# Patient Record
Sex: FEMALE | Race: WHITE | NOT HISPANIC OR LATINO | Employment: OTHER | URBAN - METROPOLITAN AREA
[De-identification: names, ages, dates, MRNs, and addresses within clinical notes are randomized per-mention and may not be internally consistent; named-entity substitution may affect disease eponyms.]

---

## 2017-08-16 ENCOUNTER — LAB (OUTPATIENT)
Dept: INTERNAL MEDICINE | Facility: CLINIC | Age: 72
End: 2017-08-16

## 2017-08-16 DIAGNOSIS — Z00.00 MEDICARE ANNUAL WELLNESS VISIT, INITIAL: Primary | ICD-10-CM

## 2017-08-16 LAB
ALBUMIN SERPL-MCNC: 4.4 G/DL (ref 3.2–4.8)
ALBUMIN/GLOB SERPL: 1.7 G/DL (ref 1.5–2.5)
ALP SERPL-CCNC: 56 U/L (ref 25–100)
ALT SERPL W P-5'-P-CCNC: 22 U/L (ref 7–40)
ANION GAP SERPL CALCULATED.3IONS-SCNC: 2 MMOL/L (ref 3–11)
ARTICHOKE IGE QN: 131 MG/DL (ref 0–130)
AST SERPL-CCNC: 24 U/L (ref 0–33)
BASOPHILS # BLD AUTO: 0.04 10*3/MM3 (ref 0–0.2)
BASOPHILS NFR BLD AUTO: 0.9 % (ref 0–1)
BILIRUB SERPL-MCNC: 0.8 MG/DL (ref 0.3–1.2)
BILIRUB UR QL STRIP: NEGATIVE
BUN BLD-MCNC: 10 MG/DL (ref 9–23)
BUN/CREAT SERPL: 14.3 (ref 7–25)
CALCIUM SPEC-SCNC: 9.6 MG/DL (ref 8.7–10.4)
CHLORIDE SERPL-SCNC: 102 MMOL/L (ref 99–109)
CHOLEST SERPL-MCNC: 207 MG/DL (ref 0–200)
CLARITY UR: CLEAR
CO2 SERPL-SCNC: 32 MMOL/L (ref 20–31)
COLOR UR: YELLOW
CREAT BLD-MCNC: 0.7 MG/DL (ref 0.6–1.3)
DEPRECATED RDW RBC AUTO: 44.9 FL (ref 37–54)
EOSINOPHIL # BLD AUTO: 0.19 10*3/MM3 (ref 0–0.3)
EOSINOPHIL NFR BLD AUTO: 4.2 % (ref 0–3)
ERYTHROCYTE [DISTWIDTH] IN BLOOD BY AUTOMATED COUNT: 13.6 % (ref 11.3–14.5)
GFR SERPL CREATININE-BSD FRML MDRD: 82 ML/MIN/1.73
GLOBULIN UR ELPH-MCNC: 2.6 GM/DL
GLUCOSE BLD-MCNC: 91 MG/DL (ref 70–100)
GLUCOSE UR STRIP-MCNC: NEGATIVE MG/DL
HCT VFR BLD AUTO: 40.4 % (ref 34.5–44)
HDLC SERPL-MCNC: 74 MG/DL (ref 40–60)
HGB BLD-MCNC: 13.3 G/DL (ref 11.5–15.5)
HGB UR QL STRIP.AUTO: NEGATIVE
IMM GRANULOCYTES # BLD: 0 10*3/MM3 (ref 0–0.03)
IMM GRANULOCYTES NFR BLD: 0 % (ref 0–0.6)
KETONES UR QL STRIP: NEGATIVE
LEUKOCYTE ESTERASE UR QL STRIP.AUTO: NEGATIVE
LYMPHOCYTES # BLD AUTO: 1.48 10*3/MM3 (ref 0.6–4.8)
LYMPHOCYTES NFR BLD AUTO: 32.7 % (ref 24–44)
MCH RBC QN AUTO: 29.4 PG (ref 27–31)
MCHC RBC AUTO-ENTMCNC: 32.9 G/DL (ref 32–36)
MCV RBC AUTO: 89.2 FL (ref 80–99)
MONOCYTES # BLD AUTO: 0.54 10*3/MM3 (ref 0–1)
MONOCYTES NFR BLD AUTO: 11.9 % (ref 0–12)
NEUTROPHILS # BLD AUTO: 2.27 10*3/MM3 (ref 1.5–8.3)
NEUTROPHILS NFR BLD AUTO: 50.3 % (ref 41–71)
NITRITE UR QL STRIP: NEGATIVE
PH UR STRIP.AUTO: 8 [PH] (ref 5–8)
PLATELET # BLD AUTO: 251 10*3/MM3 (ref 150–450)
PMV BLD AUTO: 9.3 FL (ref 6–12)
POTASSIUM BLD-SCNC: 4.6 MMOL/L (ref 3.5–5.5)
PROT SERPL-MCNC: 7 G/DL (ref 5.7–8.2)
PROT UR QL STRIP: NEGATIVE
RBC # BLD AUTO: 4.53 10*6/MM3 (ref 3.89–5.14)
SODIUM BLD-SCNC: 136 MMOL/L (ref 132–146)
SP GR UR STRIP: 1.01 (ref 1–1.03)
TRIGL SERPL-MCNC: 53 MG/DL (ref 0–150)
TSH SERPL DL<=0.05 MIU/L-ACNC: 2.93 MIU/ML (ref 0.35–5.35)
UROBILINOGEN UR QL STRIP: NORMAL
WBC NRBC COR # BLD: 4.52 10*3/MM3 (ref 3.5–10.8)

## 2017-08-16 PROCEDURE — 80061 LIPID PANEL: CPT | Performed by: INTERNAL MEDICINE

## 2017-08-16 PROCEDURE — 85025 COMPLETE CBC W/AUTO DIFF WBC: CPT | Performed by: INTERNAL MEDICINE

## 2017-08-16 PROCEDURE — 80053 COMPREHEN METABOLIC PANEL: CPT | Performed by: INTERNAL MEDICINE

## 2017-08-16 PROCEDURE — 36415 COLL VENOUS BLD VENIPUNCTURE: CPT | Performed by: INTERNAL MEDICINE

## 2017-08-16 PROCEDURE — 81003 URINALYSIS AUTO W/O SCOPE: CPT | Performed by: INTERNAL MEDICINE

## 2017-08-16 PROCEDURE — 84443 ASSAY THYROID STIM HORMONE: CPT | Performed by: INTERNAL MEDICINE

## 2017-08-28 ENCOUNTER — OFFICE VISIT (OUTPATIENT)
Dept: INTERNAL MEDICINE | Facility: CLINIC | Age: 72
End: 2017-08-28

## 2017-08-28 VITALS
DIASTOLIC BLOOD PRESSURE: 74 MMHG | WEIGHT: 139 LBS | HEIGHT: 62 IN | BODY MASS INDEX: 25.58 KG/M2 | SYSTOLIC BLOOD PRESSURE: 124 MMHG

## 2017-08-28 DIAGNOSIS — H61.22 IMPACTED CERUMEN, LEFT EAR: ICD-10-CM

## 2017-08-28 DIAGNOSIS — G89.29 CHRONIC LEFT SHOULDER PAIN: ICD-10-CM

## 2017-08-28 DIAGNOSIS — M25.512 CHRONIC LEFT SHOULDER PAIN: ICD-10-CM

## 2017-08-28 DIAGNOSIS — E78.00 PURE HYPERCHOLESTEROLEMIA: ICD-10-CM

## 2017-08-28 DIAGNOSIS — Z78.0 MENOPAUSE: ICD-10-CM

## 2017-08-28 DIAGNOSIS — Z12.11 ENCOUNTER FOR SCREENING COLONOSCOPY: ICD-10-CM

## 2017-08-28 DIAGNOSIS — Z00.00 MEDICARE ANNUAL WELLNESS VISIT, SUBSEQUENT: Primary | ICD-10-CM

## 2017-08-28 PROCEDURE — G0008 ADMIN INFLUENZA VIRUS VAC: HCPCS | Performed by: INTERNAL MEDICINE

## 2017-08-28 PROCEDURE — 69210 REMOVE IMPACTED EAR WAX UNI: CPT | Performed by: INTERNAL MEDICINE

## 2017-08-28 PROCEDURE — G0444 DEPRESSION SCREEN ANNUAL: HCPCS | Performed by: INTERNAL MEDICINE

## 2017-08-28 PROCEDURE — G0439 PPPS, SUBSEQ VISIT: HCPCS | Performed by: INTERNAL MEDICINE

## 2017-08-28 PROCEDURE — 99214 OFFICE O/P EST MOD 30 MIN: CPT | Performed by: INTERNAL MEDICINE

## 2017-08-28 PROCEDURE — 90662 IIV NO PRSV INCREASED AG IM: CPT | Performed by: INTERNAL MEDICINE

## 2017-08-28 PROCEDURE — 93000 ELECTROCARDIOGRAM COMPLETE: CPT | Performed by: INTERNAL MEDICINE

## 2017-08-28 NOTE — ASSESSMENT & PLAN NOTE
Health maintenance - flu vacc given today; Prevnar 7/15, PVX 5/11, Tdap 2009; Zostavax 2010; mammo after 10/13/17; no further Paps unless abnlities; DEXA 7/15, repeat 2018; colonosc due (last 6/12) with Dr. Link; eye exam with Dr. Ying/Dr. Keenan pending 9/17 (monitored for cataracts, no haloes at night); dental exam UTD, every 6 mos    Consultants:  Patient Care Team:  Gabby Kebede MD as PCP - General  Gabby Kebede MD as PCP - Internal Medicine  SHIRAZ Diaz as PCP - Claims Attributed  Casey Link MD as Consulting Physician (Gastroenterology)  Anuel Diaz MD as Consulting Physician (Orthopedic Surgery)  Matthew Vanegas MD as Consulting Physician (Orthopedic Surgery)  FLOR Ying Jr., MD as Consulting Physician (Ophthalmology)  Melia Keenan MD as Consulting Physician (Ophthalmology)  Raghavendra PITTS MD as Consulting Physician (Otolaryngology)

## 2017-08-28 NOTE — PROGRESS NOTES
ANNUAL WELLNESS VISIT    DRUG AND ALCOHOL USE      no alcohol use, no tobacco use and no caffeine use    DIET AND PHYSICAL ACTIVITY     Diet: general    Exercise: frequently   Exercise Details: walking, Gemma     MOOD DISORDER AND COGNITIVE SCREENING   Depression Screening Tool Used yes - see PHQ-9   Anxiety Screening Tool Used yes     Mini-Cog Performed   Yes    1. Tell Patient 3 Words table,car,book    2. Administer Clock Test normal    3. Recall 3 words  table,car,book    4. Number Correct Items 3    FUNCTIONAL ABILITY AND LEVEL OF SAFETY   Hearing no hearing loss     Wears Hearing Aids No       Current Activities Independent      none  - see Funct/Cog Status Intake     Fall Risk Assessment       Has difficulty with walking or balance  No         Timed Up and Go (TUG) Test  4 sec.       If >12 sec, normal    ADVANCED DIRECTIVE has an advance directive - a copy has been provided and is in file    PAIN SCREENING Do you have pain right now? no          Recent Hospitalizations:  No hospitalization(s) within the last year..     MEDICATION REVIEW   - updated and reviewed (see Medication List).   - reviewed for potentially harmful drug-disease interactions in the elderly.   - reviewed for high risk medications in the elderly.   - aspirin use: Not Indicated   _________________________________________  Chief Complaint   Patient presents with   • Medicare Wellness       History of Present Illness  71 y.o.  woman presents for updated wellness exam.  Reports a near-fall last 10/16; tripped in the garage but caught herself with her left arm on the mower.  Did not fall but had pain thereafter.  Continues to have pain in the left arm only in a specific position.  States it does not affect any of her daily activities nor impedes with exercise.  No assoc'd weakness, numbness/tingling.  She is right-handed.    Review of Systems  Denies headaches, visual changes, CP, palpitations, SOB, cough, abd pain, n/v/d, difficulty  "with urination, vaginal discharge or bleeding (no periods), breast concerns, numbness/tingling, falls, mood changes, lightheadedness, hearing changes, rashes.    ROS (+) for rare episodes of left arm/shoulder pain as above.    Reports seeing ENT in 5/17 and needed left ear cleaned out as well.     All other ROS reviewed and negative.    [facility]  The following portions of the patient's history were reviewed and updated as appropriate: allergies, current medications, past family history, past medical history, past social history, past surgical history and problem list.    Current Outpatient Prescriptions:   •  Biotin 5000 MCG capsule, QD  •  calcium carbonate-vitamin d 600-400 MG-UNIT QD  •  PREMARIN) 0.625 MG/GM vaginal cream, Qweek  •  Multiple Vitamins-Minerals QD    VITALS:  /74  Ht 61.5\" (156.2 cm)  Wt 139 lb (63 kg)  BMI 25.84 kg/m2    Physical Exam   Constitutional: She is oriented to person, place, and time. She appears well-developed and well-nourished.   HENT:   Head: Normocephalic.   Right Ear: External ear normal.   Nose: Nose normal.   Mouth/Throat: Oropharynx is clear and moist and mucous membranes are normal. No oropharyngeal exudate.   Left aud canal occluded by cerumen   Eyes: Conjunctivae and EOM are normal. Pupils are equal, round, and reactive to light.   Wears glasses   Neck: Normal range of motion. Neck supple. Carotid bruit is not present (bilaterally). No thyromegaly present.   Cardiovascular: Normal rate, regular rhythm and normal heart sounds.    Pulmonary/Chest: Effort normal and breath sounds normal. No respiratory distress.   Abdominal: Soft. Bowel sounds are normal. She exhibits no distension and no mass. There is no hepatosplenomegaly. There is no tenderness.   Genitourinary:   Genitourinary Comments: Breast exam unremarkable without masses, skin changes, nipple discharge, or axillary adenopathy.     Musculoskeletal: Normal range of motion. She exhibits no edema (+) " spider/varicose veins BLE.   Lymphadenopathy:     She has no cervical adenopathy.   Neurological: She is alert and oriented to person, place, and time. She has normal reflexes. She displays normal reflexes. No cranial nerve deficit.   Skin: Skin is warm and dry. No rash noted.   Psychiatric: She has a normal mood and affect. Her behavior is normal.   Nursing note and vitals reviewed.        LABS  Results for orders placed or performed in visit on 08/16/17   Lipid Panel   Result Value Ref Range    Total Cholesterol 207 (H) 0 - 200 mg/dL    Triglycerides 53 0 - 150 mg/dL    HDL Cholesterol 74 (H) 40 - 60 mg/dL    LDL Cholesterol  131 (H) 0 - 130 mg/dL   Urinalysis With / Microscopic If Indicated   Result Value Ref Range    Color, UA Yellow Yellow, Straw    Appearance, UA Clear Clear    pH, UA 8.0 5.0 - 8.0    Specific Gravity, UA 1.010 1.001 - 1.030    Glucose, UA Negative Negative    Ketones, UA Negative Negative    Bilirubin, UA Negative Negative    Blood, UA Negative Negative    Protein, UA Negative Negative    Leuk Esterase, UA Negative Negative    Nitrite, UA Negative Negative    Urobilinogen, UA 0.2 E.U./dL 0.2 - 1.0 E.U./dL   Comprehensive Metabolic Panel   Result Value Ref Range    Glucose 91 70 - 100 mg/dL    BUN 10 9 - 23 mg/dL    Creatinine 0.70 0.60 - 1.30 mg/dL    Sodium 136 132 - 146 mmol/L    Potassium 4.6 3.5 - 5.5 mmol/L    Chloride 102 99 - 109 mmol/L    CO2 32.0 (H) 20.0 - 31.0 mmol/L    Calcium 9.6 8.7 - 10.4 mg/dL    Total Protein 7.0 5.7 - 8.2 g/dL    Albumin 4.40 3.20 - 4.80 g/dL    ALT (SGPT) 22 7 - 40 U/L    AST (SGOT) 24 0 - 33 U/L    Alkaline Phosphatase 56 25 - 100 U/L    Total Bilirubin 0.8 0.3 - 1.2 mg/dL    eGFR Non African Amer 82 >60 mL/min/1.73    Globulin 2.6 gm/dL    A/G Ratio 1.7 1.5 - 2.5 g/dL    BUN/Creatinine Ratio 14.3 7.0 - 25.0    Anion Gap 2.0 (L) 3.0 - 11.0 mmol/L   TSH   Result Value Ref Range    TSH 2.930 0.350 - 5.350 mIU/mL   CBC Auto Differential   Result Value Ref  Range    WBC 4.52 3.50 - 10.80 10*3/mm3    RBC 4.53 3.89 - 5.14 10*6/mm3    Hemoglobin 13.3 11.5 - 15.5 g/dL    Hematocrit 40.4 34.5 - 44.0 %    MCV 89.2 80.0 - 99.0 fL    MCH 29.4 27.0 - 31.0 pg    MCHC 32.9 32.0 - 36.0 g/dL    RDW 13.6 11.3 - 14.5 %    RDW-SD 44.9 37.0 - 54.0 fl    MPV 9.3 6.0 - 12.0 fL    Platelets 251 150 - 450 10*3/mm3    Neutrophil % 50.3 41.0 - 71.0 %    Lymphocyte % 32.7 24.0 - 44.0 %    Monocyte % 11.9 0.0 - 12.0 %    Eosinophil % 4.2 (H) 0.0 - 3.0 %    Basophil % 0.9 0.0 - 1.0 %    Immature Grans % 0.0 0.0 - 0.6 %    Neutrophils, Absolute 2.27 1.50 - 8.30 10*3/mm3    Lymphocytes, Absolute 1.48 0.60 - 4.80 10*3/mm3    Monocytes, Absolute 0.54 0.00 - 1.00 10*3/mm3    Eosinophils, Absolute 0.19 0.00 - 0.30 10*3/mm3    Basophils, Absolute 0.04 0.00 - 0.20 10*3/mm3    Immature Grans, Absolute 0.00 0.00 - 0.03 10*3/mm3    8/16 lipids , TG 41, HDL 76,       ECG 12 Lead  Date/Time: 8/28/2017 8:47 AM  Performed by: MAURISIO AVILEZ  Authorized by: MAURISIO AVILEZ   Comparison: compared with previous ECG from 8/25/2016  Similar to previous ECG  Rhythm: sinus rhythm  Rhythm comments: sinus arrhythmia  Rate: normal  Conduction: conduction normal  ST Segments: ST segments normal  QRS axis: normal  Other findings comments: left atrial abnormality  Clinical impression comment: stable EKG            ASSESSMENT/PLAN  Problem List Items Addressed This Visit     Menopause    Relevant Medications    conjugated estrogens (PREMARIN) 0.625 MG/GM vaginal cream    Hyperlipidemia     Lipids borderline with  with goal LDL < 130 (previously 116); decrease saturated fats and cholesterol in the diet         Relevant Orders    ECG 12 Lead    Medicare annual wellness visit, subsequent - Primary     Health maintenance - flu vacc given today; Prevnar 7/15, PVX 5/11, Tdap 2009; Zostavax 2010; mammo after 10/13/17; no further Paps unless abnlities; DEXA 7/15, repeat 2018; colonosc due (last 6/12) with   Nikolay; eye exam with Dr. Ying/Dr. Keenan pending 9/17 (monitored for cataracts, no haloes at night); dental exam UTD, every 6 mos    Consultants:  Patient Care Team:  Gabby Kebede MD as PCP - General  Gabby Kebede MD as PCP - Internal Medicine  SHIRAZ Diaz as PCP - Claims Attributed  Casey Link MD as Consulting Physician (Gastroenterology)  Anuel Diaz MD as Consulting Physician (Orthopedic Surgery)  Matthew Vanegas MD as Consulting Physician (Orthopedic Surgery)  FLOR Ying Jr., MD as Consulting Physician (Ophthalmology)  Melia Keenan MD as Consulting Physician (Ophthalmology)  Raghavendra PITTS MD as Consulting Physician (Otolaryngology)               Other Visit Diagnoses     Encounter for screening colonoscopy        Relevant Orders    Ambulatory Referral to Gastroenterology    Impacted cerumen, left ear        s/p lavage of left ear with instrumentation in the office; no complications    Chronic left shoulder pain        ongoing since 10/16 but not impacting fxn or ADLs; rec consider PT if wants to have it evaluated further          FOLLOW-UP  RTC 1yr for annual wellness visit; fasting labs the week prior to appt (CBC, CMP, TSH, lipids, UA/micro)      Electronically signed by:    Gabby Kebede MD  08/28/2017

## 2017-08-28 NOTE — ASSESSMENT & PLAN NOTE
Lipids borderline with  with goal LDL < 130 (previously 116); decrease saturated fats and cholesterol in the diet

## 2017-09-01 ENCOUNTER — TRANSCRIBE ORDERS (OUTPATIENT)
Dept: ADMINISTRATIVE | Facility: HOSPITAL | Age: 72
End: 2017-09-01

## 2017-09-01 DIAGNOSIS — Z12.31 VISIT FOR SCREENING MAMMOGRAM: Primary | ICD-10-CM

## 2017-10-25 ENCOUNTER — HOSPITAL ENCOUNTER (OUTPATIENT)
Dept: MAMMOGRAPHY | Facility: HOSPITAL | Age: 72
Discharge: HOME | End: 2017-10-25
Attending: INTERNAL MEDICINE | Admitting: INTERNAL MEDICINE

## 2017-10-25 DIAGNOSIS — Z12.31 VISIT FOR SCREENING MAMMOGRAM: ICD-10-CM

## 2017-10-25 PROCEDURE — 77063 BREAST TOMOSYNTHESIS BI: CPT

## 2017-10-25 PROCEDURE — G0202 SCR MAMMO BI INCL CAD: HCPCS

## 2017-10-26 PROCEDURE — G0202 SCR MAMMO BI INCL CAD: HCPCS | Performed by: RADIOLOGY

## 2017-10-26 PROCEDURE — 77063 BREAST TOMOSYNTHESIS BI: CPT | Performed by: RADIOLOGY

## 2017-11-10 ENCOUNTER — OFFICE VISIT (OUTPATIENT)
Dept: INTERNAL MEDICINE | Facility: CLINIC | Age: 72
End: 2017-11-10

## 2017-11-10 VITALS
HEIGHT: 62 IN | BODY MASS INDEX: 26.31 KG/M2 | DIASTOLIC BLOOD PRESSURE: 74 MMHG | TEMPERATURE: 98.7 F | HEART RATE: 73 BPM | OXYGEN SATURATION: 99 % | WEIGHT: 143 LBS | SYSTOLIC BLOOD PRESSURE: 132 MMHG

## 2017-11-10 DIAGNOSIS — H65.03 BILATERAL ACUTE SEROUS OTITIS MEDIA, RECURRENCE NOT SPECIFIED: ICD-10-CM

## 2017-11-10 DIAGNOSIS — J06.9 UPPER RESPIRATORY TRACT INFECTION, UNSPECIFIED TYPE: Primary | ICD-10-CM

## 2017-11-10 PROCEDURE — 99213 OFFICE O/P EST LOW 20 MIN: CPT | Performed by: NURSE PRACTITIONER

## 2017-11-10 RX ORDER — DEXTROMETHORPHAN HYDROBROMIDE AND PROMETHAZINE HYDROCHLORIDE 15; 6.25 MG/5ML; MG/5ML
5 SYRUP ORAL 4 TIMES DAILY PRN
Qty: 75 ML | Refills: 0 | Status: SHIPPED | OUTPATIENT
Start: 2017-11-10 | End: 2018-03-23

## 2017-11-10 RX ORDER — PSEUDOEPHEDRINE HCL 30 MG
TABLET ORAL
Qty: 20 TABLET | Refills: 0 | Status: SHIPPED | OUTPATIENT
Start: 2017-11-10 | End: 2018-03-23

## 2017-11-10 RX ORDER — CETIRIZINE HYDROCHLORIDE 10 MG/1
10 TABLET ORAL DAILY
Qty: 30 TABLET | Refills: 1 | Status: SHIPPED | OUTPATIENT
Start: 2017-11-10 | End: 2018-03-23

## 2017-11-10 NOTE — PROGRESS NOTES
Chief Complaint   Patient presents with   • Cough     pt states that this has been going on since yesterday, she started on tuesday with congestion.    • Nasal Congestion     and chest   • Sore lymph nodes   • Chills       HPI  Brian Pablo is a 72 y.o. female  presents with complaint of raspy throat since this past Tuesday or Wednesday, but she states she felt ok; today, she states she feels really bad and has c/o severe PND; prod whitish/yellow cough from PND; tender glands in neck; no fever, fatigue; headache waxes and wanes; chills, chest and ribs are sore from coughing which is worse when lying down.  She denies wheezing, shortness of breath, n/v/d; body aches.    She has been taking OTC cold medications and cough syrup for her symptoms.    Past Medical History:   Diagnosis Date   • Benign polyp of large intestine     colonosc 2004   • Hx of bone density study 06/2012    DEXA 6/12 nl   • Hx of bone density study 07/30/2015    DEXA nl 7/15 (-0.6), repeat 3 yrs   • Hx of colonoscopy 06/2012    nl colonoscopy 6/12, repeat in 5 yrs, per GI- Dr. Link   • Hx of Doppler ultrasound     neg AAA ultrasound (6/11)       Family History   Problem Relation Age of Onset   • Diabetes Father    • Stroke Father    • Heart disease Father    • Breast cancer Sister 45   • Angina Maternal Grandmother    • Stroke Paternal Grandmother 39   • Arthritis Mother    • Cancer Mother    • Breast cancer Mother 67   • Colon cancer Neg Hx        Social History     Social History   • Marital status:      Spouse name: N/A   • Number of children: 2   • Years of education: N/A     Occupational History   • retired speech pathologist      Social History Main Topics   • Smoking status: Never Smoker   • Smokeless tobacco: Never Used   • Alcohol use No   • Drug use: Not on file   • Sexual activity: No     Other Topics Concern   • Not on file     Social History Narrative    2 kids and 5 grandkids        Exercise - walking, alexandrea, weights  "      Review of Systems   Constitutional: Positive for chills. Negative for fever.   HENT: Positive for postnasal drip and rhinorrhea. Negative for ear pain and sore throat.    Respiratory: Positive for cough. Negative for shortness of breath and wheezing.    Cardiovascular: Negative for chest pain.   Musculoskeletal: Positive for myalgias.   Skin: Negative for rash.   Neurological: Positive for headaches. Negative for dizziness.   All other systems reviewed and are negative.      /74  Pulse 73  Temp 98.7 °F (37.1 °C)  Ht 61.5\" (156.2 cm)  Wt 143 lb (64.9 kg)  SpO2 99%  BMI 26.58 kg/m2    Physical Exam   Constitutional: She is oriented to person, place, and time. She appears well-developed and well-nourished.   HENT:   Head: Normocephalic and atraumatic.   Right Ear: External ear normal. Tympanic membrane is erythematous (mild) and bulging. A middle ear effusion (large yellow effusion, dull, minimal light reflex) is present.   Left Ear: External ear normal. Tympanic membrane is erythematous (mild) and bulging. A middle ear effusion is present.   Nose: Mucosal edema (moderate erythema, mild sweling) present. Right sinus exhibits no maxillary sinus tenderness and no frontal sinus tenderness. Left sinus exhibits no maxillary sinus tenderness and no frontal sinus tenderness.   Mouth/Throat: Uvula is midline. Posterior oropharyngeal erythema (moderate erythema; large amts of thick white PND; severe cobblestoning) present.   Eyes: Conjunctivae are normal.   Neck: Normal range of motion. Neck supple.   Cardiovascular: Normal rate, regular rhythm and normal heart sounds.    No murmur heard.  Pulmonary/Chest: Effort normal and breath sounds normal.   Lymphadenopathy:     She has cervical adenopathy (bilateral ant cervical node enlargement with tenderness).   Neurological: She is alert and oriented to person, place, and time.   Skin: Skin is warm, dry and intact. No rash noted.   Vitals " reviewed.      Assessment/Plan    1. Upper respiratory tract infection, unspecified type  - promethazine-dextromethorphan (PROMETHAZINE-DM) 6.25-15 MG/5ML syrup; Take 5 mL by mouth 4 (Four) Times a Day As Needed for Cough.  Dispense: 75 mL; Refill: 0    2. Bilateral acute serous otitis media, recurrence not specified  - cetirizine (zyrTEC) 10 MG tablet; Take 1 tablet by mouth Daily.  Dispense: 30 tablet; Refill: 1  - pseudoephedrine (SUDAFED) 30 MG tablet; Take twice a day as needed  Dispense: 20 tablet; Refill: 0    F/U if symptoms do not improve or worsen; otherwise keep next scheduled appointment with Dr. Kebede    Patient verbalizes understanding of medication dosage, comfort measures, instructions for treatment and follow-up.    Krystle Figueredo, SHIRAZ  11/10/2017    6:51 PM

## 2017-11-12 ENCOUNTER — TELEPHONE (OUTPATIENT)
Dept: INTERNAL MEDICINE | Facility: CLINIC | Age: 72
End: 2017-11-12

## 2017-11-12 DIAGNOSIS — J01.00 ACUTE MAXILLARY SINUSITIS, RECURRENCE NOT SPECIFIED: Primary | ICD-10-CM

## 2017-11-12 RX ORDER — FLUTICASONE PROPIONATE 50 MCG
1 SPRAY, SUSPENSION (ML) NASAL 2 TIMES DAILY
Qty: 1 BOTTLE | Refills: 0 | Status: SHIPPED | OUTPATIENT
Start: 2017-11-12 | End: 2018-03-23

## 2017-11-12 RX ORDER — CEFDINIR 300 MG/1
300 CAPSULE ORAL 2 TIMES DAILY
Qty: 20 CAPSULE | Refills: 0 | Status: SHIPPED | OUTPATIENT
Start: 2017-11-12 | End: 2018-03-23

## 2017-11-12 NOTE — TELEPHONE ENCOUNTER
Pt called stating that she is not feeling better, she stated that you had told her to call you if she didn't get better.

## 2017-11-12 NOTE — TELEPHONE ENCOUNTER
What are her oxygen saturation levels?  She has a pulse ox at home.  If she is still running in the 80s, she needs to go to the hospital.  If she will go, I will call ER to let them know she is coming and why.

## 2017-11-12 NOTE — TELEPHONE ENCOUNTER
Her o2 is at 97, she says the cough is gone and the nasal congestion but she has a terrible headache and feels like it is all in her head.

## 2017-11-12 NOTE — TELEPHONE ENCOUNTER
Cefdinir 300 mg capsules, take 1 capsule twice a day x 10 days; Flonase nasal spray, 1 spray to each nostril twice a day (may stop when symptoms are gone) sent to [facility] pharmacy.  She needs to f/u if she has no improvement.

## 2017-12-07 PROBLEM — K63.5 HYPERPLASTIC COLON POLYP: Status: ACTIVE | Noted: 2017-12-07

## 2018-03-23 ENCOUNTER — OFFICE VISIT (OUTPATIENT)
Dept: INTERNAL MEDICINE | Facility: CLINIC | Age: 73
End: 2018-03-23

## 2018-03-23 VITALS
SYSTOLIC BLOOD PRESSURE: 124 MMHG | DIASTOLIC BLOOD PRESSURE: 68 MMHG | RESPIRATION RATE: 16 BRPM | BODY MASS INDEX: 27.14 KG/M2 | WEIGHT: 146 LBS | HEART RATE: 78 BPM

## 2018-03-23 DIAGNOSIS — N39.0 URINARY TRACT INFECTION IN FEMALE: Primary | ICD-10-CM

## 2018-03-23 LAB
BILIRUB BLD-MCNC: NEGATIVE MG/DL
CLARITY, POC: CLEAR
COLOR UR: YELLOW
GLUCOSE UR STRIP-MCNC: NEGATIVE MG/DL
KETONES UR QL: NEGATIVE
LEUKOCYTE EST, POC: ABNORMAL
NITRITE UR-MCNC: NEGATIVE MG/ML
PH UR: 5 [PH] (ref 5–8)
PROT UR STRIP-MCNC: NEGATIVE MG/DL
RBC # UR STRIP: ABNORMAL /UL
SP GR UR: 1.01 (ref 1–1.03)
UROBILINOGEN UR QL: NORMAL

## 2018-03-23 PROCEDURE — 81003 URINALYSIS AUTO W/O SCOPE: CPT | Performed by: INTERNAL MEDICINE

## 2018-03-23 PROCEDURE — 99213 OFFICE O/P EST LOW 20 MIN: CPT | Performed by: INTERNAL MEDICINE

## 2018-03-23 RX ORDER — SULFAMETHOXAZOLE AND TRIMETHOPRIM 800; 160 MG/1; MG/1
1 TABLET ORAL 2 TIMES DAILY
Qty: 10 TABLET | Refills: 0 | Status: SHIPPED | OUTPATIENT
Start: 2018-03-23 | End: 2018-03-28

## 2018-03-23 NOTE — PROGRESS NOTES
Chief Complaint   Patient presents with   • Urinary Tract Infection       History of Present Illness  72 y.o.  woman presents for eval of poss UTI.  Denies frequent UTIs.  Just returned from [place] and not in uaul routine, including not drinking enough water.  States dev't of chills 2 days ago.  Last 2 nights, had isolated episode each note with urinary urgency.  Denies change in frequency or blood in urine.  Developed pain with urination only today.  Notes chills but no fevers.  Reports crampy sensation in the lower abdomen.  No vaginal bleeding; no n/v.     Review of Systems  ROS (+) for urin urgency, pain with urination and chills.  Denies fevers, frequency, or back pain. All other ROS reviewed and negative.    [facility]  The following portions of the patient's history were reviewed and updated as appropriate: allergies, current medications, past family history, past medical history, past social history, past surgical history and problem list.      Current Outpatient Prescriptions:   •  Biotin 5000 MCG capsule, QD  •  calcium carbonate-vitamin d 600-400 MG-UNIT per tablet, QD  •  conjugated estrogens (PREMARIN) 0.625 MG/GM vaginal cream 1/week  •  Multiple Vitamins-Minerals (MULTIVITAMIN ADULT PO), QD    VITALS:  /68 (BP Location: Right arm, Patient Position: Sitting)   Pulse 78   Resp 16   Wt 66.2 kg (146 lb)   BMI 27.14 kg/m²     Physical Exam   Constitutional: She is oriented to person, place, and time. She appears well-developed and well-nourished.   Eyes: Conjunctivae and EOM are normal.   Wears glasses   Cardiovascular: Normal rate, regular rhythm and normal heart sounds.    Pulmonary/Chest: Effort normal and breath sounds normal.   Abdominal: Soft. Bowel sounds are normal. She exhibits no distension. There is no tenderness. There is no guarding and no CVA tenderness.   Neurological: She is alert and oriented to person, place, and time.   Psychiatric: She has a normal mood and affect. Her  behavior is normal.   Nursing note and vitals reviewed.      LABS  Results for orders placed or performed in visit on 03/23/18   POC Urinalysis Dipstick, Automated   Result Value Ref Range    Color Yellow Yellow, Straw, Dark Yellow, Rachel    Clarity, UA Clear Clear    Glucose, UA Negative Negative, 1000 mg/dL (3+) mg/dL    Bilirubin Negative Negative    Ketones, UA Negative Negative    Specific Gravity  1.010 1.005 - 1.030    Blood,  Babatunde/ul (A) Negative    pH, Urine 5.0 5.0 - 8.0    Protein, POC Negative Negative mg/dL    Urobilinogen, UA Normal Normal    Leukocytes 75 Real/ul (A) Negative    Nitrite, UA Negative Negative       ASSESSMENT/PLAN  Problem List Items Addressed This Visit     None      Visit Diagnoses     Urinary tract infection in female    -  Primary    drink lots of water and RX bactrim DS BID x 5 d    Relevant Medications    sulfamethoxazole-trimethoprim (BACTRIM DS,SEPTRA DS) 800-160 MG per tablet    Other Relevant Orders    POC Urinalysis Dipstick, Automated (Completed)          FOLLOW-UP  RTC 8/30/18 for wellness as scheduled; fasting labs the week prior to appt (CBC, CMP, TSH, lipids, UA/micro)      Electronically signed by:    Gabby Kebede MD  03/23/2018

## 2018-06-05 ENCOUNTER — OFFICE VISIT (OUTPATIENT)
Dept: INTERNAL MEDICINE | Facility: CLINIC | Age: 73
End: 2018-06-05

## 2018-06-05 VITALS
DIASTOLIC BLOOD PRESSURE: 60 MMHG | HEIGHT: 62 IN | RESPIRATION RATE: 16 BRPM | TEMPERATURE: 98.1 F | SYSTOLIC BLOOD PRESSURE: 110 MMHG | OXYGEN SATURATION: 96 % | HEART RATE: 80 BPM | WEIGHT: 143 LBS | BODY MASS INDEX: 26.31 KG/M2

## 2018-06-05 DIAGNOSIS — N30.01 ACUTE CYSTITIS WITH HEMATURIA: Primary | ICD-10-CM

## 2018-06-05 DIAGNOSIS — R30.0 DYSURIA: ICD-10-CM

## 2018-06-05 PROCEDURE — 81003 URINALYSIS AUTO W/O SCOPE: CPT | Performed by: NURSE PRACTITIONER

## 2018-06-05 PROCEDURE — 87186 SC STD MICRODIL/AGAR DIL: CPT | Performed by: NURSE PRACTITIONER

## 2018-06-05 PROCEDURE — 87086 URINE CULTURE/COLONY COUNT: CPT | Performed by: NURSE PRACTITIONER

## 2018-06-05 PROCEDURE — 99213 OFFICE O/P EST LOW 20 MIN: CPT | Performed by: NURSE PRACTITIONER

## 2018-06-05 PROCEDURE — 87077 CULTURE AEROBIC IDENTIFY: CPT | Performed by: NURSE PRACTITIONER

## 2018-06-05 RX ORDER — NITROFURANTOIN 25; 75 MG/1; MG/1
100 CAPSULE ORAL EVERY 12 HOURS SCHEDULED
Qty: 14 CAPSULE | Refills: 0 | Status: SHIPPED | OUTPATIENT
Start: 2018-06-05 | End: 2018-08-30

## 2018-06-05 NOTE — PROGRESS NOTES
CHIEF COMPLAINT  Chief Complaint   Patient presents with   • Urinary Tract Infection     Pt says Sx last 2 days w/ burning and cramping starting this morning       HPI  Brian Pablo is a 72 y.o. female  presents with complaint of 3 day hx dysuria, frequency, urgency, suprapubic discomfort; chills, mild nausea; denies fever, flank pain      Past Medical History:   Diagnosis Date   • Benign polyp of large intestine     colonosc 2004   • Hx of bone density study 06/2012    DEXA 6/12 nl   • Hx of bone density study 07/30/2015    DEXA nl 7/15 (-0.6), repeat 3 yrs   • Hx of colonoscopy 06/2012    nl colonoscopy 6/12, repeat in 5 yrs, per GI- Dr. Link   • Hx of colonoscopy 12/04/2017    colonosc (12/4/17): hyperplastic polyp, repeat 5 yrs; GI - Dr. Link   • Hx of Doppler ultrasound     neg AAA ultrasound (6/11)   • Hyperplastic colon polyp 12/7/2017       Family History   Problem Relation Age of Onset   • Diabetes Father    • Stroke Father    • Heart disease Father    • Breast cancer Sister 45   • Angina Maternal Grandmother    • Stroke Paternal Grandmother 39   • Arthritis Mother    • Cancer Mother    • Breast cancer Mother 67   • Colon cancer Neg Hx        Social History     Social History   • Marital status:      Spouse name: N/A   • Number of children: 2   • Years of education: N/A     Occupational History   • retired speech pathologist      Social History Main Topics   • Smoking status: Never Smoker   • Smokeless tobacco: Never Used   • Alcohol use No   • Drug use: Unknown   • Sexual activity: No     Other Topics Concern   • Not on file     Social History Narrative    2 kids and 5 grandkids        Exercise - walking, alexandrea, weights       ROS  Review of Systems   Constitutional: Positive for chills and fatigue.   Gastrointestinal: Positive for nausea.   Genitourinary: Positive for dysuria, frequency and urgency. Negative for difficulty urinating, flank pain and hematuria.   All other systems  "reviewed and are negative.      /60   Pulse 80   Temp 98.1 °F (36.7 °C)   Resp 16   Ht 156.2 cm (61.5\")   Wt 64.9 kg (143 lb)   SpO2 96%   Breastfeeding? No   BMI 26.58 kg/m²     PHYSICAL EXAM  Physical Exam   Constitutional: She is oriented to person, place, and time. She appears well-developed and well-nourished.   HENT:   Head: Normocephalic and atraumatic.   Abdominal:   Mild suprapubic tenderness; no CVA tenderness bilaterally   Neurological: She is alert and oriented to person, place, and time.   Skin: Skin is warm, dry and intact.   Vitals reviewed.      Results for orders placed or performed in visit on 06/05/18   Urine Culture - Urine, Urine, Clean Catch   Result Value Ref Range    Urine Culture 30,000-40,000 CFU/mL Escherichia coli (A)        Susceptibility    Escherichia coli - DEREK     Ampicillin <=8 Susceptible ug/ml     Ampicillin + Sulbactam <=8/4 Susceptible ug/ml     Aztreonam <=8 Susceptible ug/ml     Cefepime <=8 Susceptible ug/ml     Cefotaxime <=2 Susceptible ug/ml     Ceftriaxone <=8 Susceptible ug/ml     Cefuroxime sodium <=4 Susceptible ug/ml     Cephalothin <=8 Susceptible ug/ml     Ertapenem <=1 Susceptible ug/ml     Gentamicin <=4 Susceptible ug/ml     Levofloxacin <=2 Susceptible ug/ml     Meropenem <=1 Susceptible ug/ml     Nitrofurantoin <=32 Susceptible ug/ml     Piperacillin + Tazobactam <=16 Susceptible ug/ml     Tetracycline <=4 Susceptible ug/ml     Tobramycin <=4 Susceptible ug/ml     Trimethoprim + Sulfamethoxazole <=2/38 Susceptible ug/ml   POCT urinalysis dipstick, automated   Result Value Ref Range    Color Yellow Yellow, Straw, Dark Yellow, Rachel    Clarity, UA Clear Clear    Specific Gravity  1.010 1.005 - 1.030    pH, Urine 5.0 5.0 - 8.0    Leukocytes 500 Real/ul (A) Negative    Nitrite, UA Negative Negative    Protein, POC Negative Negative mg/dL    Glucose, UA Negative Negative, 1000 mg/dL (3+) mg/dL    Ketones, UA Negative Negative    Urobilinogen, UA Normal " Normal    Bilirubin Negative Negative    Blood,  Babatunde/ul (A) Negative       ASSESSMENT/PLAN  1. Acute cystitis with hematuria  -start abx tx  -Nitrofurantoin 100 mg, 1 po BID x 7 days  -will notify if change of abx needed when culture result is back    2. Dysuria  - POCT urinalysis dipstick, automated  - Urine Culture - Urine, Urine, Clean Catch      FOLLOW-UP  Next scheduled f/u with Dr. Kebede    RTC sooner as needed.    Patient verbalizes understanding of medication dosage, comfort measures, instructions for treatment and follow-up.    Krystle Figueredo, APRN  06/05/2018

## 2018-06-07 LAB — BACTERIA SPEC AEROBE CULT: ABNORMAL

## 2018-08-02 ENCOUNTER — TELEPHONE (OUTPATIENT)
Dept: INTERNAL MEDICINE | Facility: CLINIC | Age: 73
End: 2018-08-02

## 2018-08-02 DIAGNOSIS — Z00.00 MEDICARE ANNUAL WELLNESS VISIT, SUBSEQUENT: ICD-10-CM

## 2018-08-02 DIAGNOSIS — E78.00 PURE HYPERCHOLESTEROLEMIA: Primary | ICD-10-CM

## 2018-08-02 NOTE — TELEPHONE ENCOUNTER
RTC 8/30/18 for wellness as scheduled; fasting labs the week prior to appt (CBC, CMP, TSH, lipids, UA/micro)

## 2018-08-17 ENCOUNTER — LAB (OUTPATIENT)
Dept: INTERNAL MEDICINE | Facility: CLINIC | Age: 73
End: 2018-08-17

## 2018-08-17 DIAGNOSIS — E78.00 PURE HYPERCHOLESTEROLEMIA: ICD-10-CM

## 2018-08-17 DIAGNOSIS — Z00.00 MEDICARE ANNUAL WELLNESS VISIT, SUBSEQUENT: ICD-10-CM

## 2018-08-17 LAB
ALBUMIN SERPL-MCNC: 4.22 G/DL (ref 3.2–4.8)
ALBUMIN/GLOB SERPL: 1.9 G/DL (ref 1.5–2.5)
ALP SERPL-CCNC: 55 U/L (ref 25–100)
ALT SERPL W P-5'-P-CCNC: 28 U/L (ref 7–40)
ANION GAP SERPL CALCULATED.3IONS-SCNC: 5 MMOL/L (ref 3–11)
ARTICHOKE IGE QN: 121 MG/DL (ref 0–130)
AST SERPL-CCNC: 29 U/L (ref 0–33)
BACTERIA UR QL AUTO: ABNORMAL /HPF
BASOPHILS # BLD AUTO: 0.03 10*3/MM3 (ref 0–0.2)
BASOPHILS NFR BLD AUTO: 0.6 % (ref 0–1)
BILIRUB SERPL-MCNC: 1.1 MG/DL (ref 0.3–1.2)
BILIRUB UR QL STRIP: NEGATIVE
BUN BLD-MCNC: 7 MG/DL (ref 9–23)
BUN/CREAT SERPL: 10 (ref 7–25)
CALCIUM SPEC-SCNC: 9.1 MG/DL (ref 8.7–10.4)
CHLORIDE SERPL-SCNC: 101 MMOL/L (ref 99–109)
CHOLEST SERPL-MCNC: 190 MG/DL (ref 0–200)
CLARITY UR: CLEAR
CO2 SERPL-SCNC: 28 MMOL/L (ref 20–31)
COLOR UR: YELLOW
CREAT BLD-MCNC: 0.7 MG/DL (ref 0.6–1.3)
DEPRECATED RDW RBC AUTO: 46.6 FL (ref 37–54)
EOSINOPHIL # BLD AUTO: 0.17 10*3/MM3 (ref 0–0.3)
EOSINOPHIL NFR BLD AUTO: 3.6 % (ref 0–3)
ERYTHROCYTE [DISTWIDTH] IN BLOOD BY AUTOMATED COUNT: 14.1 % (ref 11.3–14.5)
GFR SERPL CREATININE-BSD FRML MDRD: 82 ML/MIN/1.73
GLOBULIN UR ELPH-MCNC: 2.3 GM/DL
GLUCOSE BLD-MCNC: 89 MG/DL (ref 70–100)
GLUCOSE UR STRIP-MCNC: NEGATIVE MG/DL
HCT VFR BLD AUTO: 39.4 % (ref 34.5–44)
HDLC SERPL-MCNC: 72 MG/DL (ref 40–60)
HGB BLD-MCNC: 12.8 G/DL (ref 11.5–15.5)
HGB UR QL STRIP.AUTO: NEGATIVE
HYALINE CASTS UR QL AUTO: ABNORMAL /LPF
IMM GRANULOCYTES # BLD: 0.01 10*3/MM3 (ref 0–0.03)
IMM GRANULOCYTES NFR BLD: 0.2 % (ref 0–0.6)
KETONES UR QL STRIP: NEGATIVE
LEUKOCYTE ESTERASE UR QL STRIP.AUTO: NEGATIVE
LYMPHOCYTES # BLD AUTO: 1.51 10*3/MM3 (ref 0.6–4.8)
LYMPHOCYTES NFR BLD AUTO: 31.7 % (ref 24–44)
MCH RBC QN AUTO: 29.3 PG (ref 27–31)
MCHC RBC AUTO-ENTMCNC: 32.5 G/DL (ref 32–36)
MCV RBC AUTO: 90.2 FL (ref 80–99)
MONOCYTES # BLD AUTO: 0.74 10*3/MM3 (ref 0–1)
MONOCYTES NFR BLD AUTO: 15.5 % (ref 0–12)
NEUTROPHILS # BLD AUTO: 2.31 10*3/MM3 (ref 1.5–8.3)
NEUTROPHILS NFR BLD AUTO: 48.6 % (ref 41–71)
NITRITE UR QL STRIP: NEGATIVE
PH UR STRIP.AUTO: 8 [PH] (ref 5–8)
PLATELET # BLD AUTO: 237 10*3/MM3 (ref 150–450)
PMV BLD AUTO: 9.5 FL (ref 6–12)
POTASSIUM BLD-SCNC: 4.3 MMOL/L (ref 3.5–5.5)
PROT SERPL-MCNC: 6.5 G/DL (ref 5.7–8.2)
PROT UR QL STRIP: NEGATIVE
RBC # BLD AUTO: 4.37 10*6/MM3 (ref 3.89–5.14)
RBC # UR: ABNORMAL /HPF
REF LAB TEST METHOD: ABNORMAL
SODIUM BLD-SCNC: 134 MMOL/L (ref 132–146)
SP GR UR STRIP: 1.01 (ref 1–1.03)
SQUAMOUS #/AREA URNS HPF: ABNORMAL /HPF
TRIGL SERPL-MCNC: 55 MG/DL (ref 0–150)
TSH SERPL DL<=0.05 MIU/L-ACNC: 1.46 MIU/ML (ref 0.35–5.35)
UROBILINOGEN UR QL STRIP: NORMAL
WBC NRBC COR # BLD: 4.76 10*3/MM3 (ref 3.5–10.8)
WBC UR QL AUTO: ABNORMAL /HPF

## 2018-08-17 PROCEDURE — 84443 ASSAY THYROID STIM HORMONE: CPT | Performed by: INTERNAL MEDICINE

## 2018-08-17 PROCEDURE — 80053 COMPREHEN METABOLIC PANEL: CPT | Performed by: INTERNAL MEDICINE

## 2018-08-17 PROCEDURE — 81001 URINALYSIS AUTO W/SCOPE: CPT | Performed by: INTERNAL MEDICINE

## 2018-08-17 PROCEDURE — 85025 COMPLETE CBC W/AUTO DIFF WBC: CPT | Performed by: INTERNAL MEDICINE

## 2018-08-17 PROCEDURE — 80061 LIPID PANEL: CPT | Performed by: INTERNAL MEDICINE

## 2018-08-30 ENCOUNTER — OFFICE VISIT (OUTPATIENT)
Dept: INTERNAL MEDICINE | Facility: CLINIC | Age: 73
End: 2018-08-30

## 2018-08-30 VITALS
BODY MASS INDEX: 25.58 KG/M2 | HEART RATE: 67 BPM | WEIGHT: 139 LBS | DIASTOLIC BLOOD PRESSURE: 74 MMHG | RESPIRATION RATE: 12 BRPM | OXYGEN SATURATION: 98 % | SYSTOLIC BLOOD PRESSURE: 116 MMHG | HEIGHT: 62 IN

## 2018-08-30 DIAGNOSIS — Z78.0 MENOPAUSE: ICD-10-CM

## 2018-08-30 DIAGNOSIS — E78.00 PURE HYPERCHOLESTEROLEMIA: ICD-10-CM

## 2018-08-30 DIAGNOSIS — Z00.00 MEDICARE ANNUAL WELLNESS VISIT, SUBSEQUENT: Primary | ICD-10-CM

## 2018-08-30 DIAGNOSIS — H61.22 LEFT EAR IMPACTED CERUMEN: ICD-10-CM

## 2018-08-30 PROCEDURE — 90662 IIV NO PRSV INCREASED AG IM: CPT | Performed by: INTERNAL MEDICINE

## 2018-08-30 PROCEDURE — 93000 ELECTROCARDIOGRAM COMPLETE: CPT | Performed by: INTERNAL MEDICINE

## 2018-08-30 PROCEDURE — G0444 DEPRESSION SCREEN ANNUAL: HCPCS | Performed by: INTERNAL MEDICINE

## 2018-08-30 PROCEDURE — 69210 REMOVE IMPACTED EAR WAX UNI: CPT | Performed by: INTERNAL MEDICINE

## 2018-08-30 PROCEDURE — 99214 OFFICE O/P EST MOD 30 MIN: CPT | Performed by: INTERNAL MEDICINE

## 2018-08-30 PROCEDURE — G0439 PPPS, SUBSEQ VISIT: HCPCS | Performed by: INTERNAL MEDICINE

## 2018-08-30 PROCEDURE — G0008 ADMIN INFLUENZA VIRUS VAC: HCPCS | Performed by: INTERNAL MEDICINE

## 2018-08-30 NOTE — ASSESSMENT & PLAN NOTE
Health maintenance - flu vacc given today; Prevnar 7/15, PVX 5/11, Tdap 2009; Zostavax 2010; rec Shingrix; mammo after 10/25/18; no further Paps unless abnlities; DEXA ordered (last 9/15); colonosc 12/17, repeat 2022 per Dr. Link; eye exam with Dr. Dr. Keenan pending 9/18; dental exam UTD q6 mos; (+) seat belt use    Consultants:  Patient Care Team:  Gabby Kebede MD as PCP - General  Gabby Kebede MD as PCP - Internal Medicine  Krystle Figueredo APRN as PCP - Claims Attributed  Nikolay, Casey Malik MD as Consulting Physician (Gastroenterology)  Anuel Diaz MD as Consulting Physician (Orthopedic Surgery)  Matthew Vanegas MD as Consulting Physician (Orthopedic Surgery)  FLOR Ying Jr., MD as Consulting Physician (Ophthalmology)  Melia Keenan MD as Consulting Physician (Ophthalmology)  Raghavendra Coburn MD as Consulting Physician (Otolaryngology)

## 2018-08-30 NOTE — PROGRESS NOTES
ANNUAL WELLNESS VISIT    DRUG AND ALCOHOL USE   no tob use   alcohol intake:glass of wine with dinner    DIET AND PHYSICAL ACTIVITY     Diet: general    Exercise: frequently   Exercise Details: walking    MOOD DISORDER AND COGNITIVE SCREENING   Depression Screening Tool Used yes - see PHQ-9   Anxiety Screening Tool Used yes     Mini-Cog Performed   Yes    1. Tell Patient 3 Words apple, table, chair    2. Administer Clock Test normal    3. Recall 3 words  apple, table, chair    4. Number Correct Items 3    FUNCTIONAL ABILITY AND LEVEL OF SAFETY   Hearing no hearing loss     Wears Hearing Aids No       Current Activities Independent      none  - see Funct/Cog Status Intake     Fall Risk Assessment       Has difficulty with walking or balance  No         Timed Up and Go (TUG) Test  7 sec.       If >12 sec, normal    ADVANCED DIRECTIVE has an advance directive - a copy HAS NOT been provided    PAIN SCREENING Do you have pain right now? no      If so, 1-10 scale: 0     Do you have pain every day? No      Probable chronic pain: No     Recent Hospitalizations:  No recent hospitalization(s)..     MEDICATION REVIEW   - updated and reviewed (see Medication List).   - reviewed for potentially harmful drug-disease interactions in the elderly.   - reviewed for high risk medications in the elderly.   - aspirin use: No    BMI  Body mass index is 25.84 kg/m².    Patient's Body mass index is 25.84 kg/m². BMI is within normal parameters. No follow-up required.    _________________________________________________________  Chief Complaint   Patient presents with   • Annual Exam     subsequent AWV       History of Present Illness  72 y.o.  woman presents for updated wellness visit.  Feels well overall without complaints.    Review of Systems  Denies headaches, visual changes, CP, palpitations, SOB, cough, abd pain, n/v/d, difficulty with urination, numbness/tingling, falls, mood changes, lightheadedness, hearing changes,  "rashes.    Denies vaginal discharge or bleeding (no periods) or breast concerns.    Notes ENT visit usual yearly now due to ear wax.  Notes audiology screening and cerumen cleaning; normal screening in 5/18. Was recommended to use sweet oil 1x/month, then increased to 1x/week currently due to increased ear wax. Denies sxs or hearing changes recently.     All other ROS reviewed and negative.    [facility]  The following portions of the patient's history were reviewed and updated as appropriate: allergies, current medications, past family history, past medical history, past social history, past surgical history and problem list.    Current Outpatient Prescriptions:   •  Biotin 5000 MCG capsule, QD  •  calcium carbonate-vitamin d 600-400 MG-UNIT QD  •  conjugated estrogens (PREMARIN) 0.625 MG/GM vaginal cream, 1x/wk  •  Multiple Vitamins-Minerals (MULTIVITAMIN ADULT PO), QD    VITALS:  /74   Pulse 67   Resp 12   Ht 156.2 cm (61.5\")   Wt 63 kg (139 lb)   SpO2 98%   BMI 25.84 kg/m²     Physical Exam   Constitutional: She is oriented to person, place, and time. She appears well-developed and well-nourished.   HENT:   Head: Normocephalic.   Right Ear: External ear normal.   Nose: Nose normal.   Mouth/Throat: Oropharynx is clear and moist and mucous membranes are normal. No oropharyngeal exudate.   Left aud canal completely occluded by cerumen; intact hearing to finger rubbing bilaterally   Eyes: Pupils are equal, round, and reactive to light. Conjunctivae and EOM are normal.   Neck: Normal range of motion. Neck supple. Carotid bruit is not present (bilaterally). No thyromegaly present.   Cardiovascular: Normal rate, regular rhythm and normal heart sounds.    Pulmonary/Chest: Effort normal and breath sounds normal. No respiratory distress. She has no wheezes. She has no rales.   Abdominal: Soft. Bowel sounds are normal. She exhibits no distension and no mass. There is no hepatosplenomegaly. There is no tenderness. "   Genitourinary:   Genitourinary Comments: Breast exam unremarkable without masses, skin changes, nipple discharge, or axillary adenopathy.     Musculoskeletal: Normal range of motion. She exhibits no edema.   Lymphadenopathy:     She has no cervical adenopathy.   Neurological: She is alert and oriented to person, place, and time. She has normal reflexes. She displays normal reflexes. No cranial nerve deficit.   Skin: Skin is warm and dry. No rash noted.   Psychiatric: She has a normal mood and affect. Her behavior is normal.   Nursing note and vitals reviewed.      LABS  Results for orders placed or performed in visit on 08/17/18   Comprehensive Metabolic Panel   Result Value Ref Range    Glucose 89 70 - 100 mg/dL    BUN 7 (L) 9 - 23 mg/dL    Creatinine 0.70 0.60 - 1.30 mg/dL    Sodium 134 132 - 146 mmol/L    Potassium 4.3 3.5 - 5.5 mmol/L    Chloride 101 99 - 109 mmol/L    CO2 28.0 20.0 - 31.0 mmol/L    Calcium 9.1 8.7 - 10.4 mg/dL    Total Protein 6.5 5.7 - 8.2 g/dL    Albumin 4.22 3.20 - 4.80 g/dL    ALT (SGPT) 28 7 - 40 U/L    AST (SGOT) 29 0 - 33 U/L    Alkaline Phosphatase 55 25 - 100 U/L    Total Bilirubin 1.1 0.3 - 1.2 mg/dL    eGFR Non African Amer 82 >60 mL/min/1.73    Globulin 2.3 gm/dL    A/G Ratio 1.9 1.5 - 2.5 g/dL    BUN/Creatinine Ratio 10.0 7.0 - 25.0    Anion Gap 5.0 3.0 - 11.0 mmol/L   TSH   Result Value Ref Range    TSH 1.463 0.350 - 5.350 mIU/mL   Lipid Panel   Result Value Ref Range    Total Cholesterol 190 0 - 200 mg/dL    Triglycerides 55 0 - 150 mg/dL    HDL Cholesterol 72 (H) 40 - 60 mg/dL    LDL Cholesterol  121 0 - 130 mg/dL   CBC Auto Differential   Result Value Ref Range    WBC 4.76 3.50 - 10.80 10*3/mm3    RBC 4.37 3.89 - 5.14 10*6/mm3    Hemoglobin 12.8 11.5 - 15.5 g/dL    Hematocrit 39.4 34.5 - 44.0 %    MCV 90.2 80.0 - 99.0 fL    MCH 29.3 27.0 - 31.0 pg    MCHC 32.5 32.0 - 36.0 g/dL    RDW 14.1 11.3 - 14.5 %    RDW-SD 46.6 37.0 - 54.0 fl    MPV 9.5 6.0 - 12.0 fL    Platelets 237  150 - 450 10*3/mm3    Neutrophil % 48.6 41.0 - 71.0 %    Lymphocyte % 31.7 24.0 - 44.0 %    Monocyte % 15.5 (H) 0.0 - 12.0 %    Eosinophil % 3.6 (H) 0.0 - 3.0 %    Basophil % 0.6 0.0 - 1.0 %    Immature Grans % 0.2 0.0 - 0.6 %    Neutrophils, Absolute 2.31 1.50 - 8.30 10*3/mm3    Lymphocytes, Absolute 1.51 0.60 - 4.80 10*3/mm3    Monocytes, Absolute 0.74 0.00 - 1.00 10*3/mm3    Eosinophils, Absolute 0.17 0.00 - 0.30 10*3/mm3    Basophils, Absolute 0.03 0.00 - 0.20 10*3/mm3    Immature Grans, Absolute 0.01 0.00 - 0.03 10*3/mm3   Urinalysis without microscopic (no culture) - Urine, Clean Catch   Result Value Ref Range    Color, UA Yellow Yellow, Straw    Appearance, UA Clear Clear    pH, UA 8.0 5.0 - 8.0    Specific Gravity, UA 1.010 1.005 - 1.030    Glucose, UA Negative Negative    Ketones, UA Negative Negative    Bilirubin, UA Negative Negative    Blood, UA Negative Negative    Protein, UA Negative Negative    Leuk Esterase, UA Negative Negative    Nitrite, UA Negative Negative    Urobilinogen, UA 0.2 E.U./dL 0.2 - 1.0 E.U./dL   Urinalysis, Microscopic Only - Urine, Clean Catch   Result Value Ref Range    RBC, UA 0-2 None Seen, 0-2 /HPF    WBC, UA None Seen None Seen, 0-2 /HPF    Bacteria, UA None Seen None Seen, Trace /HPF    Squamous Epithelial Cells, UA 3-6 (A) None Seen, 0-2 /HPF    Hyaline Casts, UA None Seen 0 - 6 /LPF    Methodology Automated Microscopy        ECG 12 Lead  Date/Time: 8/30/2018 9:21 AM  Performed by: MAURISIO AVILEZ  Authorized by: MAURISIO AVILEZ   Comparison: compared with previous ECG from 8/28/2017  Similar to previous ECG  Comparison to previous ECG: Except no further inverted T wave in V2  Rhythm: sinus rhythm  Rate: normal  BPM: 66  Conduction: conduction normal  ST Segments: ST segments normal  T Waves: T waves normal  QRS axis: normal  Clinical impression comment: stable EKG            ASSESSMENT/PLAN  Problem List Items Addressed This Visit     Menopause    Relevant Medications     conjugated estrogens (PREMARIN) 0.625 MG/GM vaginal cream    Other Relevant Orders    DEXA Bone Density Axial    Hyperlipidemia     Lipids remain stable; no meds         Relevant Orders    ECG 12 Lead    Medicare annual wellness visit, subsequent - Primary     Health maintenance - flu vacc given today; Prevnar 7/15, PVX 5/11, Tdap 2009; Zostavax 2010; rec Shingrix; mammo after 10/25/18; no further Paps unless abnlities; DEXA ordered (last 9/15); colonosc 12/17, repeat 2022 per Dr. Link; eye exam with Dr. Dr. Keenan pending 9/18; dental exam UTD q6 mos; (+) seat belt use    Consultants:  Patient Care Team:  Gabby Kebede MD as PCP - General  Gabby Kebede MD as PCP - Internal Medicine  Krystle Figueredo APRN as PCP - Claims Attributed  Casey Link MD as Consulting Physician (Gastroenterology)  Anuel Diaz MD as Consulting Physician (Orthopedic Surgery)  Matthew Vanegas MD as Consulting Physician (Orthopedic Surgery)  FLOR Ying Jr., MD as Consulting Physician (Ophthalmology)  Melia Keenan MD as Consulting Physician (Ophthalmology)  Raghavendra Coburn MD as Consulting Physician (Otolaryngology)               Other Visit Diagnoses     Left ear impacted cerumen        s/p lavage with water pick and scoop; tolerated procedure well, no complications; rec cont sweet oil weekly and could H202/water rinse mid-week          FOLLOW-UP  RTC 1yr for annual wellness; fasting labs the week prior to appt (CBC, CMP, TSH, lipids, UA/micro)      Electronically signed by:    Gabby Kebede MD  08/30/2018

## 2018-09-04 ENCOUNTER — TRANSCRIBE ORDERS (OUTPATIENT)
Dept: ADMINISTRATIVE | Facility: HOSPITAL | Age: 73
End: 2018-09-04

## 2018-09-04 DIAGNOSIS — Z12.31 VISIT FOR SCREENING MAMMOGRAM: Primary | ICD-10-CM

## 2018-10-26 ENCOUNTER — OFFICE VISIT (OUTPATIENT)
Dept: INTERNAL MEDICINE | Facility: CLINIC | Age: 73
End: 2018-10-26

## 2018-10-26 VITALS
OXYGEN SATURATION: 97 % | SYSTOLIC BLOOD PRESSURE: 114 MMHG | HEART RATE: 92 BPM | WEIGHT: 139 LBS | BODY MASS INDEX: 25.58 KG/M2 | HEIGHT: 62 IN | DIASTOLIC BLOOD PRESSURE: 76 MMHG

## 2018-10-26 DIAGNOSIS — R07.89 CHEST TIGHTNESS OR PRESSURE: Primary | ICD-10-CM

## 2018-10-26 DIAGNOSIS — R00.2 PALPITATIONS: ICD-10-CM

## 2018-10-26 LAB
ALBUMIN SERPL-MCNC: 4.38 G/DL (ref 3.2–4.8)
ALBUMIN/GLOB SERPL: 2 G/DL (ref 1.5–2.5)
ALP SERPL-CCNC: 56 U/L (ref 25–100)
ALT SERPL W P-5'-P-CCNC: 26 U/L (ref 7–40)
ANION GAP SERPL CALCULATED.3IONS-SCNC: 8 MMOL/L (ref 3–11)
AST SERPL-CCNC: 35 U/L (ref 0–33)
BASOPHILS # BLD AUTO: 0.05 10*3/MM3 (ref 0–0.2)
BASOPHILS NFR BLD AUTO: 0.8 % (ref 0–1)
BILIRUB SERPL-MCNC: 0.7 MG/DL (ref 0.3–1.2)
BUN BLD-MCNC: 11 MG/DL (ref 9–23)
BUN/CREAT SERPL: 14.9 (ref 7–25)
CALCIUM SPEC-SCNC: 9.2 MG/DL (ref 8.7–10.4)
CHLORIDE SERPL-SCNC: 102 MMOL/L (ref 99–109)
CO2 SERPL-SCNC: 26 MMOL/L (ref 20–31)
CREAT BLD-MCNC: 0.74 MG/DL (ref 0.6–1.3)
DEPRECATED RDW RBC AUTO: 48.4 FL (ref 37–54)
EOSINOPHIL # BLD AUTO: 0.2 10*3/MM3 (ref 0–0.3)
EOSINOPHIL NFR BLD AUTO: 3.3 % (ref 0–3)
ERYTHROCYTE [DISTWIDTH] IN BLOOD BY AUTOMATED COUNT: 14.1 % (ref 11.3–14.5)
GFR SERPL CREATININE-BSD FRML MDRD: 77 ML/MIN/1.73
GLOBULIN UR ELPH-MCNC: 2.2 GM/DL
GLUCOSE BLD-MCNC: 82 MG/DL (ref 70–100)
HCT VFR BLD AUTO: 40.3 % (ref 34.5–44)
HGB BLD-MCNC: 12.9 G/DL (ref 11.5–15.5)
IMM GRANULOCYTES # BLD: 0.01 10*3/MM3 (ref 0–0.03)
IMM GRANULOCYTES NFR BLD: 0.2 % (ref 0–0.6)
LYMPHOCYTES # BLD AUTO: 1.6 10*3/MM3 (ref 0.6–4.8)
LYMPHOCYTES NFR BLD AUTO: 26.3 % (ref 24–44)
MCH RBC QN AUTO: 29.7 PG (ref 27–31)
MCHC RBC AUTO-ENTMCNC: 32 G/DL (ref 32–36)
MCV RBC AUTO: 92.9 FL (ref 80–99)
MONOCYTES # BLD AUTO: 0.68 10*3/MM3 (ref 0–1)
MONOCYTES NFR BLD AUTO: 11.2 % (ref 0–12)
NEUTROPHILS # BLD AUTO: 3.54 10*3/MM3 (ref 1.5–8.3)
NEUTROPHILS NFR BLD AUTO: 58.2 % (ref 41–71)
PLATELET # BLD AUTO: 265 10*3/MM3 (ref 150–450)
PMV BLD AUTO: 9.3 FL (ref 6–12)
POTASSIUM BLD-SCNC: 4.3 MMOL/L (ref 3.5–5.5)
PROT SERPL-MCNC: 6.6 G/DL (ref 5.7–8.2)
RBC # BLD AUTO: 4.34 10*6/MM3 (ref 3.89–5.14)
SODIUM BLD-SCNC: 136 MMOL/L (ref 132–146)
TSH SERPL DL<=0.05 MIU/L-ACNC: 1.97 MIU/ML (ref 0.35–5.35)
WBC NRBC COR # BLD: 6.08 10*3/MM3 (ref 3.5–10.8)

## 2018-10-26 PROCEDURE — 99213 OFFICE O/P EST LOW 20 MIN: CPT | Performed by: NURSE PRACTITIONER

## 2018-10-26 PROCEDURE — 84443 ASSAY THYROID STIM HORMONE: CPT | Performed by: NURSE PRACTITIONER

## 2018-10-26 PROCEDURE — 80053 COMPREHEN METABOLIC PANEL: CPT | Performed by: NURSE PRACTITIONER

## 2018-10-26 PROCEDURE — 85025 COMPLETE CBC W/AUTO DIFF WBC: CPT | Performed by: NURSE PRACTITIONER

## 2018-10-31 ENCOUNTER — HOSPITAL ENCOUNTER (OUTPATIENT)
Dept: MAMMOGRAPHY | Facility: HOSPITAL | Age: 73
Discharge: HOME | End: 2018-10-31
Attending: INTERNAL MEDICINE

## 2018-10-31 ENCOUNTER — HOSPITAL ENCOUNTER (OUTPATIENT)
Dept: BONE DENSITY | Facility: HOSPITAL | Age: 73
Discharge: HOME | End: 2018-10-31
Attending: INTERNAL MEDICINE | Admitting: INTERNAL MEDICINE

## 2018-10-31 DIAGNOSIS — Z78.0 MENOPAUSE: ICD-10-CM

## 2018-10-31 DIAGNOSIS — Z12.31 VISIT FOR SCREENING MAMMOGRAM: ICD-10-CM

## 2018-10-31 PROCEDURE — 77067 SCR MAMMO BI INCL CAD: CPT

## 2018-10-31 PROCEDURE — 77063 BREAST TOMOSYNTHESIS BI: CPT

## 2018-10-31 PROCEDURE — 77063 BREAST TOMOSYNTHESIS BI: CPT | Performed by: RADIOLOGY

## 2018-10-31 PROCEDURE — 77067 SCR MAMMO BI INCL CAD: CPT | Performed by: RADIOLOGY

## 2018-10-31 PROCEDURE — 77080 DXA BONE DENSITY AXIAL: CPT

## 2018-11-01 NOTE — PROGRESS NOTES
Dear Ms. Pablo,    Thank you for obtaining your DEXA (bone density) scan.  I have received those results and would like to review them with you.      Your bone density remains in the normal range.  As compared to the DEXA in 2015, the bone density is mildly improved at the spine and worsened at the hip and in the forearm.    Please maintain regular calcium and vitamin D supplementation.  Calcium intake should be 1000mg per day between diet and supplements.  Weight bearing exercise is good for bone health as well.    We should plan to repeat your DEXA in 3 years.  Please let me know if you have any questions or concerns regarding these results.        Sincerely,  Gabby Kebede MD

## 2018-11-07 PROCEDURE — 93000 ELECTROCARDIOGRAM COMPLETE: CPT | Performed by: NURSE PRACTITIONER

## 2018-11-10 ENCOUNTER — TELEPHONE (OUTPATIENT)
Dept: INTERNAL MEDICINE | Facility: CLINIC | Age: 73
End: 2018-11-10

## 2018-11-14 PROBLEM — R00.2 PALPITATIONS: Status: ACTIVE | Noted: 2018-11-14

## 2018-11-14 NOTE — TELEPHONE ENCOUNTER
PATIENT IS RETURNING OUR CALL. SHE STATES WE CALLED HER AN HOUR AGO TO DISCUSS HER TEST RESULTS. YOU CAN REACH HER BACK -[phone]

## 2018-11-20 ENCOUNTER — TELEPHONE (OUTPATIENT)
Dept: INTERNAL MEDICINE | Facility: CLINIC | Age: 73
End: 2018-11-20

## 2018-11-20 DIAGNOSIS — R00.2 PALPITATIONS: Primary | ICD-10-CM

## 2018-11-20 NOTE — TELEPHONE ENCOUNTER
----- Message from SHIRAZ Petersen sent at 11/20/2018  1:59 PM EST -----  Please call Ms. Pablo to let her know, I sent a referral for Cardiology--Dr. John Staton--recommended by Dr. Kebede.

## 2018-12-10 NOTE — PROGRESS NOTES
Subjective:     Encounter Date:12/12/2018    Patient ID: Brian Pablo is a 73 y.o.  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    REFERRING HEALTHCARE PROVIDER: SHIRAZ Wakefield  INTERNIST:  Gabby Kebede MD  NEUROSURGEON: Jw Hunter MD    Chief Complaint:   Chief Complaint   Patient presents with   • Irregular Heart Beat   • Chest Pain     Problem List:  1. Palpitations:  a. Holter monitor, November 2018, demonstrated sinus rhythm, 8 single VE's, 135 single SVE's, 10 paired.  Symptoms consistent with PACs and PVCs which are less than 1%  b. Residual  Class I symptoms with acceptable EKG  2. Hyperlipidemia; ASCVD 10-year risk 14.7%, 10.8% if treated  3. Positional Vertigo  4. Osteoarthritis  5. Surgical history:   a. Colonoscopy with polyp removal  b. C5-C6 fusion    No Known Allergies    Current Outpatient Medications:   •  Biotin 5000 MCG capsule, Take 1 tablet by mouth daily., Disp: , Rfl:   •  calcium carbonate-vitamin d 600-400 MG-UNIT per tablet, Take  by mouth. Take as Directed, Disp: , Rfl:   •  conjugated estrogens (PREMARIN) 0.625 MG/GM vaginal cream, Insert  into the vagina 1 (One) Time Per Week. As Directed once a week, Disp: 30 g, Rfl: 1  •  Multiple Vitamins-Minerals (MULTIVITAMIN ADULT PO), Take 1 tablet by mouth daily., Disp: , Rfl:     History of Present Illness  This is a 73-year-old white female who presents to establish cardiology care today for palpitations.  She wore a Holter monitor in November 2018 that showed less than 1% PAC/PVC burden.  She denies caffeine use, thyroid dysfunction, or decongestant use.  She started to notice these palpitations in October 2018.  She denies any chest pressure, shortness of breath, dizziness, presyncope, syncope, claudication, orthopnea, PND, stress tests, MIs, heart catheterizations, echocardiograms, rheumatic fever, CVAs, TIAs, seizures, DVTs, PEs, COPD, asthma, or smoking history.  She is normally active doing  yoga 3 days a week and some walking without any cardiopulmonary complaints.  She had concerns that these palpitations might be dangerous or might develop into atrial fibrillation.  She notices her palpitations more at night, but they are not associated with any other symptoms and feels that they are daily.  She denies any snoring, apneic spells, or daytime sleepiness.  She also denies any type 2 diabetes mellitus, hypertension, hyperlipidemia, PIH, preeclampsia, or gestational diabetes.  Her mother told her she had a heart murmur when she was born.  Her blood pressure at home is normally around 125 systolic.  When she has palpitations, she feels chest discomfort but no pain.  Patient otherwise denies chest pain, shortness of breath, PND, edema, palpitations, syncope or presyncope at this time.    Cardiovascular Disease Risk Factors  increased age    Social History     Socioeconomic History   • Marital status:      Spouse name: Not on file   • Number of children: 2   • Years of education: Not on file   • Highest education level: Not on file   Social Needs   • Financial resource strain: Not on file   • Food insecurity - worry: Not on file   • Food insecurity - inability: Not on file   • Transportation needs - medical: Not on file   • Transportation needs - non-medical: Not on file   Occupational History   • Occupation: retired speech pathologist   Tobacco Use   • Smoking status: Never Smoker   • Smokeless tobacco: Never Used   Substance and Sexual Activity   • Alcohol use: No   • Drug use: Not on file   • Sexual activity: No   Other Topics Concern   • Not on file   Social History Narrative    2 kids and 5 grandkids        Exercise - walking, alexandrea, weights       Family History   Problem Relation Age of Onset   • Diabetes Father    • Stroke Father    • Heart disease Father    • Breast cancer Sister 45   • Angina Maternal Grandmother    • Stroke Paternal Grandmother 39   • Arthritis Mother    • Cancer Mother    •  "Breast cancer Mother 67   • Colon cancer Neg Hx        Review of Systems   Constitution: Negative.   HENT: Negative.    Eyes: Negative.    Cardiovascular: Positive for chest pain, irregular heartbeat and palpitations. Negative for claudication, dyspnea on exertion, leg swelling, near-syncope, orthopnea, paroxysmal nocturnal dyspnea and syncope.   Respiratory: Negative.    Endocrine: Negative.    Hematologic/Lymphatic: Negative.    Skin: Negative.    Musculoskeletal: Negative.    Gastrointestinal: Negative.    Genitourinary: Negative.    Neurological: Negative for excessive daytime sleepiness, dizziness, focal weakness, light-headedness and seizures.   Psychiatric/Behavioral: Negative.    Allergic/Immunologic: Negative.       Obtained and negative except as outlined in problem list and HPI.      ECG 12 Lead  Date/Time: 12/12/2018 10:51 AM  Performed by: John Staton MD  Authorized by: John Staton MD   Rhythm comments: Normal sinus rhythm, normal ECG, 70 bpm, QRS 74 ms,  ms,  ms                 Objective:       Vitals:    12/12/18 1015 12/12/18 1016 12/12/18 1017 12/12/18 1018   BP: 147/91 133/90 148/88 147/76   BP Location: Right arm Right arm Left arm Left arm   Patient Position: Sitting Standing Sitting Standing   Pulse: 73 78 79 74   Weight: 63.1 kg (139 lb 3.2 oz)      Height: 156.2 cm (61.5\")      Recheck blood pressure left arm sitting was 130/78  Body mass index is 25.88 kg/m².     Physical Exam   Constitutional: She is oriented to person, place, and time. She appears well-developed and well-nourished.   HENT:   Mouth/Throat: Uvula is midline, oropharynx is clear and moist and mucous membranes are normal. She does not have dentures. No oral lesions. Normal dentition. No dental abscesses, uvula swelling, lacerations or dental caries.   Eyes:   Fundoscopic exam:       The right eye shows AV nicking. The right eye shows no exudate and no hemorrhage.        The left eye shows AV nicking. The " left eye shows no exudate and no hemorrhage.   Neck: No JVD present. Carotid bruit is not present. No thyromegaly present.   Cardiovascular: Regular rhythm, S1 normal and S2 normal. Exam reveals no gallop, no S3 and no friction rub.   Murmur heard.   Medium-pitched harsh early systolic murmur is present with a grade of 1/6 at the upper right sternal border.  Pulses:       Dorsalis pedis pulses are 2+ on the right side, and 2+ on the left side.        Posterior tibial pulses are 2+ on the right side, and 2+ on the left side.   Pulmonary/Chest: Effort normal and breath sounds normal. She has no wheezes. She has no rhonchi. She has no rales.   Abdominal: Soft. She exhibits no mass. There is no hepatosplenomegaly. There is no tenderness. There is no guarding.   Bowel sounds audible x4   Musculoskeletal: Normal range of motion. She exhibits no edema.   Lymphadenopathy:     She has no cervical adenopathy.   Neurological: She is alert and oriented to person, place, and time.   Skin: Skin is warm, dry and intact. No rash noted.   Vitals reviewed.      Lab Review:   ASCVD 10-year risk - 14.7%, 10.8% if treated    Results for orders placed or performed in visit on 10/26/18   Comprehensive metabolic panel   Result Value Ref Range    Glucose 82 70 - 100 mg/dL    BUN 11 9 - 23 mg/dL    Creatinine 0.74 0.60 - 1.30 mg/dL    Sodium 136 132 - 146 mmol/L    Potassium 4.3 3.5 - 5.5 mmol/L    Chloride 102 99 - 109 mmol/L    CO2 26.0 20.0 - 31.0 mmol/L    Calcium 9.2 8.7 - 10.4 mg/dL    Total Protein 6.6 5.7 - 8.2 g/dL    Albumin 4.38 3.20 - 4.80 g/dL    ALT (SGPT) 26 7 - 40 U/L    AST (SGOT) 35 (H) 0 - 33 U/L    Alkaline Phosphatase 56 25 - 100 U/L    Total Bilirubin 0.7 0.3 - 1.2 mg/dL    eGFR Non African Amer 77 >60 mL/min/1.73    Globulin 2.2 gm/dL    A/G Ratio 2.0 1.5 - 2.5 g/dL    BUN/Creatinine Ratio 14.9 7.0 - 25.0    Anion Gap 8.0 3.0 - 11.0 mmol/L   TSH   Result Value Ref Range    TSH 1.972 0.350 - 5.350 mIU/mL   CBC Auto  Differential   Result Value Ref Range    WBC 6.08 3.50 - 10.80 10*3/mm3    RBC 4.34 3.89 - 5.14 10*6/mm3    Hemoglobin 12.9 11.5 - 15.5 g/dL    Hematocrit 40.3 34.5 - 44.0 %    MCV 92.9 80.0 - 99.0 fL    MCH 29.7 27.0 - 31.0 pg    MCHC 32.0 32.0 - 36.0 g/dL    RDW 14.1 11.3 - 14.5 %    RDW-SD 48.4 37.0 - 54.0 fl    MPV 9.3 6.0 - 12.0 fL    Platelets 265 150 - 450 10*3/mm3    Neutrophil % 58.2 41.0 - 71.0 %    Lymphocyte % 26.3 24.0 - 44.0 %    Monocyte % 11.2 0.0 - 12.0 %    Eosinophil % 3.3 (H) 0.0 - 3.0 %    Basophil % 0.8 0.0 - 1.0 %    Immature Grans % 0.2 0.0 - 0.6 %    Neutrophils, Absolute 3.54 1.50 - 8.30 10*3/mm3    Lymphocytes, Absolute 1.60 0.60 - 4.80 10*3/mm3    Monocytes, Absolute 0.68 0.00 - 1.00 10*3/mm3    Eosinophils, Absolute 0.20 0.00 - 0.30 10*3/mm3    Basophils, Absolute 0.05 0.00 - 0.20 10*3/mm3    Immature Grans, Absolute 0.01 0.00 - 0.03 10*3/mm3           Assessment:   Patient with occasional PACs and PVCs.  Her recent Holter monitor demonstrated less than 1% PAC/PVC burden.  We will order an echocardiogram to assess heart structure/function and provide her with acebutolol 200 mg daily to help suppress her palpitations.  She is planning a 5-day cruise to [facility] over the Christmas holiday break with her family, and we will provide a travel packet.  Would defer consideration of MPS/LHC at this time.  There are no findings to suggest sick sinus syndrome or complex ectopy.  If palpitations redevelop, would consider an event monitor.     Diagnosis Plan   1. Palpitations  Adult Transthoracic Echo Complete W/ Cont if Necessary Per Protocol   2. Hyperlipidemia  No labs to review          Plan:       1. Patient to continue current medications and close follow up with the above providers other than to initiate acebutolol 200 mg daily.  2. Tentative cardiology follow up in 4 weeks or patient may return sooner PRN.  3. Echocardiogram ordered  4. Travel pack will be provided  5. 1 800 card  provided      Scribed for John Staton MD by SHIRAZ Rawls. 12/12/2018  11:16 AM    I, John Staton MD, [facility], personally performed the services described in this documentation as scribed by the above named individual in my presence, and it is both accurate and complete. At 11:40 AM on 12/12/2018

## 2018-12-12 ENCOUNTER — CONSULT (OUTPATIENT)
Dept: CARDIOLOGY | Facility: CLINIC | Age: 73
End: 2018-12-12

## 2018-12-12 VITALS
WEIGHT: 139.2 LBS | BODY MASS INDEX: 25.62 KG/M2 | SYSTOLIC BLOOD PRESSURE: 147 MMHG | DIASTOLIC BLOOD PRESSURE: 76 MMHG | HEART RATE: 74 BPM | HEIGHT: 62 IN

## 2018-12-12 DIAGNOSIS — E78.00 PURE HYPERCHOLESTEROLEMIA: ICD-10-CM

## 2018-12-12 DIAGNOSIS — R00.2 PALPITATIONS: Primary | ICD-10-CM

## 2018-12-12 PROCEDURE — 93000 ELECTROCARDIOGRAM COMPLETE: CPT | Performed by: INTERNAL MEDICINE

## 2018-12-12 PROCEDURE — 99203 OFFICE O/P NEW LOW 30 MIN: CPT | Performed by: INTERNAL MEDICINE

## 2018-12-12 RX ORDER — ACEBUTOLOL HYDROCHLORIDE 200 MG/1
200 CAPSULE ORAL DAILY
Qty: 90 CAPSULE | Refills: 3 | Status: SHIPPED | OUTPATIENT
Start: 2018-12-12 | End: 2019-11-26 | Stop reason: SDUPTHER

## 2018-12-21 ENCOUNTER — HOSPITAL ENCOUNTER (OUTPATIENT)
Dept: CARDIOLOGY | Facility: HOSPITAL | Age: 73
Discharge: HOME | End: 2018-12-21
Attending: INTERNAL MEDICINE | Admitting: INTERNAL MEDICINE

## 2018-12-21 VITALS — BODY MASS INDEX: 26.24 KG/M2 | HEIGHT: 61 IN | WEIGHT: 139 LBS

## 2018-12-21 DIAGNOSIS — R00.2 PALPITATIONS: ICD-10-CM

## 2018-12-21 LAB
BH CV ECHO MEAS - AO ROOT AREA (BSA CORRECTED): 1.5
BH CV ECHO MEAS - AO ROOT AREA: 4.9 CM^2
BH CV ECHO MEAS - AO ROOT DIAM: 2.5 CM
BH CV ECHO MEAS - BSA(HAYCOCK): 1.7 M^2
BH CV ECHO MEAS - BSA: 1.6 M^2
BH CV ECHO MEAS - BZI_BMI: 26.3 KILOGRAMS/M^2
BH CV ECHO MEAS - BZI_METRIC_HEIGHT: 154.9 CM
BH CV ECHO MEAS - BZI_METRIC_WEIGHT: 63.1 KG
BH CV ECHO MEAS - EDV(CUBED): 65 ML
BH CV ECHO MEAS - EDV(MOD-SP2): 44 ML
BH CV ECHO MEAS - EDV(MOD-SP4): 51 ML
BH CV ECHO MEAS - EDV(TEICH): 70.8 ML
BH CV ECHO MEAS - EF(CUBED): 75.1 %
BH CV ECHO MEAS - EF(MOD-BP): 65 %
BH CV ECHO MEAS - EF(MOD-SP2): 63.6 %
BH CV ECHO MEAS - EF(MOD-SP4): 62.7 %
BH CV ECHO MEAS - EF(TEICH): 67.5 %
BH CV ECHO MEAS - ESV(CUBED): 16.2 ML
BH CV ECHO MEAS - ESV(MOD-SP2): 16 ML
BH CV ECHO MEAS - ESV(MOD-SP4): 19 ML
BH CV ECHO MEAS - ESV(TEICH): 23 ML
BH CV ECHO MEAS - FS: 37.1 %
BH CV ECHO MEAS - IVS/LVPW: 1
BH CV ECHO MEAS - IVSD: 0.78 CM
BH CV ECHO MEAS - LAD MAJOR: 3.9 CM
BH CV ECHO MEAS - LAT PEAK E' VEL: 8.9 CM/SEC
BH CV ECHO MEAS - LATERAL E/E' RATIO: 9.4
BH CV ECHO MEAS - LV DIASTOLIC VOL/BSA (35-75): 31.5 ML/M^2
BH CV ECHO MEAS - LV MASS(C)D: 91.3 GRAMS
BH CV ECHO MEAS - LV MASS(C)DI: 56.4 GRAMS/M^2
BH CV ECHO MEAS - LV SYSTOLIC VOL/BSA (12-30): 11.7 ML/M^2
BH CV ECHO MEAS - LVIDD: 4 CM
BH CV ECHO MEAS - LVIDS: 2.5 CM
BH CV ECHO MEAS - LVLD AP2: 7.1 CM
BH CV ECHO MEAS - LVLD AP4: 7.6 CM
BH CV ECHO MEAS - LVLS AP2: 6.1 CM
BH CV ECHO MEAS - LVLS AP4: 6.1 CM
BH CV ECHO MEAS - LVPWD: 0.78 CM
BH CV ECHO MEAS - MED PEAK E' VEL: 7 CM/SEC
BH CV ECHO MEAS - MEDIAL E/E' RATIO: 11.9
BH CV ECHO MEAS - MV A MAX VEL: 94.3 CM/SEC
BH CV ECHO MEAS - MV DEC TIME: 0.21 SEC
BH CV ECHO MEAS - MV E MAX VEL: 83.4 CM/SEC
BH CV ECHO MEAS - MV E/A: 0.88
BH CV ECHO MEAS - PA ACC SLOPE: 477 CM/SEC^2
BH CV ECHO MEAS - PA ACC TIME: 0.09 SEC
BH CV ECHO MEAS - PA PR(ACCEL): 39.4 MMHG
BH CV ECHO MEAS - PI END-D VEL: 91.9 CM/SEC
BH CV ECHO MEAS - RAP SYSTOLE: 3 MMHG
BH CV ECHO MEAS - RVSP: 30 MMHG
BH CV ECHO MEAS - SI(CUBED): 30.1 ML/M^2
BH CV ECHO MEAS - SI(MOD-SP2): 17.3 ML/M^2
BH CV ECHO MEAS - SI(MOD-SP4): 19.8 ML/M^2
BH CV ECHO MEAS - SI(TEICH): 29.6 ML/M^2
BH CV ECHO MEAS - SV(CUBED): 48.8 ML
BH CV ECHO MEAS - SV(MOD-SP2): 28 ML
BH CV ECHO MEAS - SV(MOD-SP4): 32 ML
BH CV ECHO MEAS - SV(TEICH): 47.8 ML
BH CV ECHO MEAS - TAPSE (>1.6): 2.1 CM2
BH CV ECHO MEAS - TR MAX PG: 27 MMHG
BH CV ECHO MEAS - TR MAX VEL: 256.5 CM/SEC
BH CV ECHO MEASUREMENTS AVERAGE E/E' RATIO: 10.49
BH CV VAS BP LEFT ARM: NORMAL MMHG
BH CV XLRA - RV BASE: 3.2 CM
BH CV XLRA - RV LENGTH: 6.6 CM
BH CV XLRA - RV MID: 2.8 CM
BH CV XLRA - TDI S': 10.7 CM/SEC
LEFT ATRIUM VOLUME INDEX: 15.4 ML/M^2
LEFT ATRIUM VOLUME: 25 ML
LV EF 2D ECHO EST: 68 %

## 2018-12-21 PROCEDURE — 93306 TTE W/DOPPLER COMPLETE: CPT

## 2018-12-21 PROCEDURE — 93306 TTE W/DOPPLER COMPLETE: CPT | Performed by: INTERNAL MEDICINE

## 2019-01-25 ENCOUNTER — OFFICE VISIT (OUTPATIENT)
Dept: CARDIOLOGY | Facility: CLINIC | Age: 74
End: 2019-01-25

## 2019-01-25 VITALS
HEIGHT: 61 IN | BODY MASS INDEX: 26.81 KG/M2 | SYSTOLIC BLOOD PRESSURE: 150 MMHG | WEIGHT: 142 LBS | HEART RATE: 65 BPM | DIASTOLIC BLOOD PRESSURE: 83 MMHG

## 2019-01-25 DIAGNOSIS — E78.00 PURE HYPERCHOLESTEROLEMIA: ICD-10-CM

## 2019-01-25 DIAGNOSIS — I10 ESSENTIAL HYPERTENSION: ICD-10-CM

## 2019-01-25 DIAGNOSIS — R00.2 PALPITATIONS: Primary | ICD-10-CM

## 2019-01-25 PROCEDURE — 99214 OFFICE O/P EST MOD 30 MIN: CPT | Performed by: INTERNAL MEDICINE

## 2019-01-25 NOTE — PROGRESS NOTES
"    Subjective:     Encounter Date:01/25/2019      Patient ID: Brian Pablo is a 73 y.o.  white female from [place], [place], retired speech pathologist.    REFERRING HEALTHCARE PROVIDER: SHIRAZ Wakefield  INTERNIST:  Gabby Kebede MD  NEUROSURGEON: Jw Hunter MD    Chief Complaint:   Chief Complaint   Patient presents with   • Palpitations   • Hyperlipidemia     Problem List:  1. Palpitations:  a. Holter monitor, 11/01/2018: Demonstrated sinus rhythm, 8 single VE's, 135 single SVE's, 10 paired.  Symptoms consistent with PACs and PVCs which are less than 1%  b. Residual  Class I symptoms with acceptable EKG  c. Echocardiogram, 12/21/2018: EF 68%. Mild-to-moderate TR. Left ventricular diastolic dysfunction is abnormal consistent with: age. No evidence of pericardial effusion. Mild pulmonary hypertension.   d. Residual class I symptoms, January 2019.   2. Hyperlipidemia; ASCVD 10-year risk 14.7%, 10.8% if treated  3. Positional Vertigo  4. Osteoarthritis  5. Surgical history:   a. Colonoscopy with polyp removal  b. C5-C6 fusion      No Known Allergies      Current Outpatient Medications:   •  acebutolol (SECTRAL) 200 MG capsule, Take 1 capsule by mouth Daily., Disp: 90 capsule, Rfl: 3  •  Biotin 5000 MCG capsule, Take 1 tablet by mouth daily., Disp: , Rfl:   •  calcium carbonate-vitamin d 600-400 MG-UNIT per tablet, Take  by mouth. Take as Directed, Disp: , Rfl:   •  conjugated estrogens (PREMARIN) 0.625 MG/GM vaginal cream, Insert  into the vagina 1 (One) Time Per Week. As Directed once a week, Disp: 30 g, Rfl: 1  •  Multiple Vitamins-Minerals (MULTIVITAMIN ADULT PO), Take 1 tablet by mouth daily., Disp: , Rfl:       HISTORY OF PRESENT ILLNESS: Patient returns for follow-up after a month hiatus. The acebutolol was effective as soon as she took the first dose. She experiences what she calls \"palpitations\" during the evening. She is concerned that it has to do with any foods that she is eating. The " "patient does not take in any caffeine. She monitors her BP at home, it runs around 122/72. She take the acebutolol in the morning instead of the evening. Pt would like to engage in gardening for physical exercise. No strenuous activity. She questions whether she should take aspirin 81 mg daily. Patient otherwise denies chest pain, shortness of breath, PND, edema, sustained palpitations, syncope or presyncope at this time.        Review of Systems   Cardiovascular: Positive for palpitations.      Obtained and otherwise negative except as outlined in problem list and HPI.    Procedures       Objective:       Vitals:    01/25/19 0847 01/25/19 0848   BP: 154/85 150/83   BP Location: Left arm Left arm   Patient Position: Sitting Standing   Pulse: 63 65   Weight: 64.4 kg (142 lb) 64.4 kg (142 lb)   Height: 154.9 cm (61\") 154.9 cm (61\")     Body mass index is 26.83 kg/m².   Last weight: 139 lbs.   BP, left arm, sitting Dr. Staton: 140/80    Physical Exam   Constitutional: She is oriented to person, place, and time. She appears well-developed and well-nourished.   HENT:   Mouth/Throat: Oropharynx is clear and moist.   Neck: No JVD present. Carotid bruit is not present. No thyromegaly present.   Cardiovascular: Regular rhythm, S1 normal, S2 normal, normal heart sounds and intact distal pulses. Exam reveals no gallop, no S3 and no S4.   No murmur heard.  Pulses:       Carotid pulses are 2+ on the right side, and 2+ on the left side.       Radial pulses are 2+ on the right side, and 2+ on the left side.   Pulmonary/Chest: Breath sounds normal.   Abdominal: Soft. Bowel sounds are normal. She exhibits no mass. There is no tenderness.   Musculoskeletal: She exhibits no edema.   Neurological: She is alert and oriented to person, place, and time.   Skin: Skin is warm and dry. No rash noted.       Lab Review:   Lab Results   Component Value Date    GLUCOSE 82 10/26/2018    BUN 11 10/26/2018    CREATININE 0.74 10/26/2018    " EGFRIFNONA 77 10/26/2018    BCR 14.9 10/26/2018    CO2 26.0 10/26/2018    CALCIUM 9.2 10/26/2018    ALBUMIN 4.38 10/26/2018    AST 35 (H) 10/26/2018    ALT 26 10/26/2018       Lab Results   Component Value Date    WBC 6.08 10/26/2018    HGB 12.9 10/26/2018    HCT 40.3 10/26/2018    MCV 92.9 10/26/2018     10/26/2018     Lab Results   Component Value Date    TSH 1.972 10/26/2018       Lab Results   Component Value Date    CHOL 190 08/17/2018    CHOL 207 (H) 08/16/2017     Lab Results   Component Value Date    TRIG 55 08/17/2018    TRIG 53 08/16/2017     Lab Results   Component Value Date    HDL 72 (H) 08/17/2018    HDL 74 (H) 08/16/2017     Lab Results   Component Value Date     08/17/2018     (H) 08/16/2017   · Echocardiogram, 12/21/2018:  · Mild to moderate tricuspid valve regurgitation is present.  · Estimated EF = 68%.  · Left ventricular systolic function is normal.  · Left ventricular diastolic dysfunction is abnormal consistent with: age.  · Normal right ventricular cavity size, wall thickness, systolic function and septal motion noted.  · There is no evidence of pericardial effusion.  · Mild pulmonary hypertension is present.  · No significant structural valvular abnormality demonstrated.  · 24 Hour - Holter Monitor, 11/01/2018:  · A normal monitor study.  · Patient's symptoms correlated with PACs and PVCs which overall were rare (0.1%)          Assessment:       Overall continued acceptable course with no interim cardiopulmonary complaints with good functional status. We will continue the current treatment. She tentatively plans to see Doctors Dawn and Enedelia concerning possible food allergies.    Diagnosis Plan   1. Palpitations  Continue current medications, acceptable control. She may take 200 mg in the evening if palpitations are persistent.    2. Essential hypertension  Continue to monitor home blood pressure.   3. Hyperlipidemia  Heart healthy diet          Plan:          1. Patient to continue current medications and close follow up with the above providers.  2. Tentative cardiology follow up in May/June 2019 or patient may return sooner PRN.    Scribed for John Staton MD by Nikolas Sotomayor. 1/25/2019  11:32 AM     .I, John Staton MD, [facility], personally performed the services described in this documentation as scribed by the above named individual in my presence, and it is both accurate and complete. At 9:11 AM on 01/25/2019

## 2019-06-26 ENCOUNTER — OFFICE VISIT (OUTPATIENT)
Dept: CARDIOLOGY | Facility: CLINIC | Age: 74
End: 2019-06-26

## 2019-06-26 VITALS
HEART RATE: 63 BPM | DIASTOLIC BLOOD PRESSURE: 75 MMHG | WEIGHT: 140.6 LBS | HEIGHT: 61 IN | BODY MASS INDEX: 26.55 KG/M2 | SYSTOLIC BLOOD PRESSURE: 111 MMHG

## 2019-06-26 DIAGNOSIS — I10 ESSENTIAL HYPERTENSION: ICD-10-CM

## 2019-06-26 DIAGNOSIS — R00.2 PALPITATIONS: Primary | ICD-10-CM

## 2019-06-26 DIAGNOSIS — E78.00 PURE HYPERCHOLESTEROLEMIA: ICD-10-CM

## 2019-06-26 PROCEDURE — 99214 OFFICE O/P EST MOD 30 MIN: CPT | Performed by: INTERNAL MEDICINE

## 2019-06-26 RX ORDER — ASPIRIN 81 MG/1
81 TABLET ORAL DAILY
COMMUNITY
End: 2021-09-14

## 2019-06-26 NOTE — PROGRESS NOTES
Subjective:     Encounter Date:06/26/2019    Patient ID: Brian Pablo is a 73 y.o.  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     REFERRING HEALTHCARE PROVIDER: SHIRAZ Wakefield  INTERNIST:  Gabby Kebede MD  NEUROSURGEON: Jw Hunter MD    Chief Complaint:   Chief Complaint   Patient presents with   • Palpitations     Problem List:  1. Palpitations:  a. Holter monitor, 11/01/2018: Demonstrated sinus rhythm, 8 single VE's, 135 single SVEs, 10 paired.  Symptoms consistent with PACs and PVCs which are less than 1%  b. Residual  Class I symptoms with acceptable EKG  c. Echocardiogram, 12/21/2018: EF 0.68. Mild-to-moderate TR. Left ventricular diastolic dysfunction is abnormal consistent with: age. No evidence of pericardial effusion. Mild pulmonary hypertension.   d. Residual class I symptoms, January 2019, June 2019  2. Hyperlipidemia; ASCVD 10-year risk 14.7%, 10.8% if treated  3. Positional Vertigo  4. Osteoarthritis  5. Surgical history:   a. Colonoscopy with polyp removal  b. C5-C6 fusion       No Known Allergies      Current Outpatient Medications:   •  acebutolol (SECTRAL) 200 MG capsule, Take 1 capsule by mouth Daily., Disp: 90 capsule, Rfl: 3  •  aspirin 81 MG EC tablet, Take 81 mg by mouth Daily., Disp: , Rfl:   •  Biotin 5000 MCG capsule, Take 1 tablet by mouth daily., Disp: , Rfl:   •  calcium carbonate-vitamin d 600-400 MG-UNIT per tablet, Take  by mouth Daily. Take as Directed, Disp: , Rfl:   •  conjugated estrogens (PREMARIN) 0.625 MG/GM vaginal cream, Insert  into the vagina 1 (One) Time Per Week. As Directed once a week, Disp: 30 g, Rfl: 1  •  Multiple Vitamins-Minerals (MULTIVITAMIN ADULT PO), Take 1 tablet by mouth daily., Disp: , Rfl:     HISTORY OF PRESENT ILLNESS: Patient returns for scheduled 5-month followup.  She has rare awareness of tachypalpitations, which are self-limiting.  They are not associated with chest pain, syncope, dizziness, or shortness  "of breath.  When seen in our office in January 2019, her blood pressure was 150/83.  She comes in today with a daily log of blood pressures, with a minimum of 88 and a maximum of 146 mmHg systolic.  The vast majority of blood pressures range from 100 to 110 mmHg.  Her heart rate range is in the 60s to 70s.  She had a recent lipid panel, which was improved and overall favorable.  She has no complaint of exertional chest pain or dyspnea, no orthopnea, no PND, no claudication, and no lower extremity edema.  She denies dizziness or syncope.  States compliance with the current medical regimen and reports no significant side effects.  Patient otherwise denies chest pain, shortness of breath, PND, edema, palpitations, syncope or presyncope at this time.        Review of Systems   Constitution: Negative for diaphoresis, weakness, malaise/fatigue and weight gain.   Cardiovascular: Positive for palpitations. Negative for chest pain, claudication, dyspnea on exertion, irregular heartbeat, leg swelling, orthopnea, paroxysmal nocturnal dyspnea and syncope.   Respiratory: Negative for cough, shortness of breath, sleep disturbances due to breathing and wheezing.    Hematologic/Lymphatic: Negative for bleeding problem.   Musculoskeletal: Negative for muscle cramps, muscle weakness and myalgias.   Gastrointestinal: Negative for heartburn.      All other systems reviewed and otherwise negative.    Procedures       Objective:       Vitals:    06/26/19 1414 06/26/19 1416   BP: 128/79 111/75   BP Location: Left arm Left arm   Patient Position: Sitting Standing   Pulse: 61 63   Weight: 63.8 kg (140 lb 9.6 oz)    Height: 154.9 cm (61\")      Body mass index is 26.57 kg/m².   Last weight:  142 lbs.    Physical Exam   Constitutional: She is oriented to person, place, and time. She appears well-developed and well-nourished.   Neck: No JVD present. Carotid bruit is not present. No thyromegaly present.   Cardiovascular: Regular rhythm, S1 " normal and S2 normal. Exam reveals no gallop, no S3 and no friction rub.   Murmur heard.   Medium-pitched early systolic murmur is present with a grade of 2/6 at the lower left sternal border.  Pulses:       Dorsalis pedis pulses are 2+ on the right side, and 2+ on the left side.        Posterior tibial pulses are 2+ on the right side, and 2+ on the left side.   Pulmonary/Chest: Effort normal and breath sounds normal. She has no wheezes. She has no rhonchi. She has no rales.   Abdominal: Soft. She exhibits no mass. There is no hepatosplenomegaly. There is no tenderness. There is no guarding.   Bowel sounds audible x4   Musculoskeletal: Normal range of motion. She exhibits no edema.   Lymphadenopathy:     She has no cervical adenopathy.   Neurological: She is alert and oriented to person, place, and time.   Skin: Skin is warm, dry and intact. No rash noted.   Vitals reviewed.        Lab Review: No recent laboratory studies available for review today    Lab Results   Component Value Date    GLUCOSE 82 10/26/2018    BUN 11 10/26/2018    CREATININE 0.74 10/26/2018    EGFRIFNONA 77 10/26/2018    BCR 14.9 10/26/2018    CO2 26.0 10/26/2018    CALCIUM 9.2 10/26/2018    ALBUMIN 4.38 10/26/2018    AST 35 (H) 10/26/2018    ALT 26 10/26/2018       Lab Results   Component Value Date    WBC 6.08 10/26/2018    HGB 12.9 10/26/2018    HCT 40.3 10/26/2018    MCV 92.9 10/26/2018     10/26/2018       Lab Results   Component Value Date    TSH 1.972 10/26/2018       Lab Results   Component Value Date    CHOL 190 08/17/2018    CHOL 207 (H) 08/16/2017     Lab Results   Component Value Date    TRIG 55 08/17/2018    TRIG 53 08/16/2017     Lab Results   Component Value Date    HDL 72 (H) 08/17/2018    HDL 74 (H) 08/16/2017     Lab Results   Component Value Date     08/17/2018     (H) 08/16/2017            Assessment:   Overall continued acceptable course with no interim cardiopulmonary complaints with good functional  status.  She has only a rare awareness of tachypalpitations which are self-limiting.  Her blood pressures are well controlled on current medical regimen.  Her lipid status is acceptable on current medical regimen. We will defer additional diagnostic or therapeutic intervention from a cardiac perspective at this time.       Diagnosis Plan   1. Palpitations   continue Sectral and aspirin   2. Hyperlipidemia  Well managed,  continue current therapy.     3. Essential hypertension  Well managed,  continue current therapy.            Plan:         1. Patient to continue current medications and close follow up with the above providers.  2. Tentative cardiology follow up in December 2019, or patient may return sooner PRN.       Scribed for John Staton MD by Electronically signed by MARY Phan, 06/26/19, 2:45 PM.    I, John Staton MD, [facility], personally performed the services described in this documentation as scribed by the above named individual in my presence, and it is both accurate and complete. At 3:37 PM on 06/26/2019

## 2019-06-27 PROBLEM — R01.1 NEWLY RECOGNIZED HEART MURMUR: Status: ACTIVE | Noted: 2019-06-27

## 2019-08-05 ENCOUNTER — TELEPHONE (OUTPATIENT)
Dept: INTERNAL MEDICINE | Facility: CLINIC | Age: 74
End: 2019-08-05

## 2019-08-05 DIAGNOSIS — Z00.00 MEDICARE ANNUAL WELLNESS VISIT, SUBSEQUENT: Primary | ICD-10-CM

## 2019-08-05 DIAGNOSIS — I10 ESSENTIAL HYPERTENSION: ICD-10-CM

## 2019-08-05 DIAGNOSIS — E78.00 PURE HYPERCHOLESTEROLEMIA: ICD-10-CM

## 2019-08-28 ENCOUNTER — LAB (OUTPATIENT)
Dept: INTERNAL MEDICINE | Facility: CLINIC | Age: 74
End: 2019-08-28

## 2019-08-28 DIAGNOSIS — E78.00 PURE HYPERCHOLESTEROLEMIA: ICD-10-CM

## 2019-08-28 DIAGNOSIS — Z00.00 MEDICARE ANNUAL WELLNESS VISIT, SUBSEQUENT: ICD-10-CM

## 2019-08-28 DIAGNOSIS — I10 ESSENTIAL HYPERTENSION: ICD-10-CM

## 2019-08-28 LAB
ALBUMIN SERPL-MCNC: 4.6 G/DL (ref 3.5–5.2)
ALBUMIN/GLOB SERPL: 1.8 G/DL
ALP SERPL-CCNC: 51 U/L (ref 39–117)
ALT SERPL W P-5'-P-CCNC: 22 U/L (ref 1–33)
ANION GAP SERPL CALCULATED.3IONS-SCNC: 11.9 MMOL/L (ref 5–15)
AST SERPL-CCNC: 25 U/L (ref 1–32)
BACTERIA UR QL AUTO: ABNORMAL /HPF
BASOPHILS # BLD AUTO: 0.04 10*3/MM3 (ref 0–0.2)
BASOPHILS NFR BLD AUTO: 0.8 % (ref 0–1.5)
BILIRUB SERPL-MCNC: 0.7 MG/DL (ref 0.2–1.2)
BILIRUB UR QL STRIP: NEGATIVE
BUN BLD-MCNC: 12 MG/DL (ref 8–23)
BUN/CREAT SERPL: 15.2 (ref 7–25)
CALCIUM SPEC-SCNC: 10.1 MG/DL (ref 8.6–10.5)
CHLORIDE SERPL-SCNC: 100 MMOL/L (ref 98–107)
CHOLEST SERPL-MCNC: 187 MG/DL (ref 0–200)
CLARITY UR: CLEAR
CO2 SERPL-SCNC: 26.1 MMOL/L (ref 22–29)
COLOR UR: YELLOW
CREAT BLD-MCNC: 0.79 MG/DL (ref 0.57–1)
DEPRECATED RDW RBC AUTO: 48.6 FL (ref 37–54)
EOSINOPHIL # BLD AUTO: 0.16 10*3/MM3 (ref 0–0.4)
EOSINOPHIL NFR BLD AUTO: 3.3 % (ref 0.3–6.2)
ERYTHROCYTE [DISTWIDTH] IN BLOOD BY AUTOMATED COUNT: 14.1 % (ref 12.3–15.4)
GFR SERPL CREATININE-BSD FRML MDRD: 71 ML/MIN/1.73
GLOBULIN UR ELPH-MCNC: 2.5 GM/DL
GLUCOSE BLD-MCNC: 91 MG/DL (ref 65–99)
GLUCOSE UR STRIP-MCNC: NEGATIVE MG/DL
HCT VFR BLD AUTO: 43.2 % (ref 34–46.6)
HDLC SERPL-MCNC: 69 MG/DL (ref 40–60)
HGB BLD-MCNC: 13.4 G/DL (ref 12–15.9)
HGB UR QL STRIP.AUTO: NEGATIVE
HYALINE CASTS UR QL AUTO: ABNORMAL /LPF
IMM GRANULOCYTES # BLD AUTO: 0.01 10*3/MM3 (ref 0–0.05)
IMM GRANULOCYTES NFR BLD AUTO: 0.2 % (ref 0–0.5)
KETONES UR QL STRIP: NEGATIVE
LDLC SERPL CALC-MCNC: 105 MG/DL (ref 0–100)
LDLC/HDLC SERPL: 1.52 {RATIO}
LEUKOCYTE ESTERASE UR QL STRIP.AUTO: NEGATIVE
LYMPHOCYTES # BLD AUTO: 1.43 10*3/MM3 (ref 0.7–3.1)
LYMPHOCYTES NFR BLD AUTO: 29.4 % (ref 19.6–45.3)
MCH RBC QN AUTO: 29.3 PG (ref 26.6–33)
MCHC RBC AUTO-ENTMCNC: 31 G/DL (ref 31.5–35.7)
MCV RBC AUTO: 94.3 FL (ref 79–97)
MONOCYTES # BLD AUTO: 0.48 10*3/MM3 (ref 0.1–0.9)
MONOCYTES NFR BLD AUTO: 9.9 % (ref 5–12)
NEUTROPHILS # BLD AUTO: 2.74 10*3/MM3 (ref 1.7–7)
NEUTROPHILS NFR BLD AUTO: 56.4 % (ref 42.7–76)
NITRITE UR QL STRIP: NEGATIVE
NRBC BLD AUTO-RTO: 0 /100 WBC (ref 0–0.2)
PH UR STRIP.AUTO: 8 [PH] (ref 5–8)
PLATELET # BLD AUTO: 271 10*3/MM3 (ref 140–450)
PMV BLD AUTO: 9.6 FL (ref 6–12)
POTASSIUM BLD-SCNC: 4.6 MMOL/L (ref 3.5–5.2)
PROT SERPL-MCNC: 7.1 G/DL (ref 6–8.5)
PROT UR QL STRIP: NEGATIVE
RBC # BLD AUTO: 4.58 10*6/MM3 (ref 3.77–5.28)
RBC # UR: ABNORMAL /HPF
REF LAB TEST METHOD: ABNORMAL
SODIUM BLD-SCNC: 138 MMOL/L (ref 136–145)
SP GR UR STRIP: 1.01 (ref 1–1.03)
SQUAMOUS #/AREA URNS HPF: ABNORMAL /HPF
TRIGL SERPL-MCNC: 65 MG/DL (ref 0–150)
TSH SERPL DL<=0.05 MIU/L-ACNC: 1.48 MIU/ML (ref 0.27–4.2)
UROBILINOGEN UR QL STRIP: NORMAL
VLDLC SERPL-MCNC: 13 MG/DL (ref 5–40)
WBC NRBC COR # BLD: 4.86 10*3/MM3 (ref 3.4–10.8)
WBC UR QL AUTO: ABNORMAL /HPF

## 2019-08-28 PROCEDURE — 84443 ASSAY THYROID STIM HORMONE: CPT | Performed by: INTERNAL MEDICINE

## 2019-08-28 PROCEDURE — 80061 LIPID PANEL: CPT | Performed by: INTERNAL MEDICINE

## 2019-08-28 PROCEDURE — 80053 COMPREHEN METABOLIC PANEL: CPT | Performed by: INTERNAL MEDICINE

## 2019-08-28 PROCEDURE — 85025 COMPLETE CBC W/AUTO DIFF WBC: CPT | Performed by: INTERNAL MEDICINE

## 2019-08-28 PROCEDURE — 81001 URINALYSIS AUTO W/SCOPE: CPT | Performed by: INTERNAL MEDICINE

## 2019-09-02 NOTE — ASSESSMENT & PLAN NOTE
Health maintenance - flu vacc to be done at pharmacy(ran out of high-dose in the office today); Prevnar 7/15, PVX 5/11, due for Td (Tdap 2009) - also rec getting at pharmacy; Zostavax 2010; Shingrix done per patient; rec HAV - also get at pharmacy; mammo after 10/31/19; no further Paps unless abnlities; DEXA 7/18, repeat 2021; colonosc 12/17, repeat 2022 per Dr. Link; discussed considerations for screening tests after 74 - can review in further detail next yr; eye exam with Dr. Dr. Keenan 10/18; dental exam q6 mos; (+) seat belt use    Consultants:  Patient Care Team:  Gabby Kebede MD as PCP - General  Gabby Kebede MD as PCP - Internal Medicine  Krystle Figueredo APRN as PCP - Claims Attributed  Nikolay, Casey Malik MD as Consulting Physician (Gastroenterology)  Anuel Diaz MD as Consulting Physician (Orthopedic Surgery)  Matthew Vanegas MD as Consulting Physician (Orthopedic Surgery)  FLOR Ying Jr., MD as Consulting Physician (Ophthalmology)  Melia Keenan MD as Consulting Physician (Ophthalmology)  Raghavendra Coburn MD as Consulting Physician (Otolaryngology)  John Staton MD as Consulting Physician (Cardiology)  Nestor Mcdaniels MD as Consulting Physician (Pediatric Allergy and Immunology)

## 2019-09-03 ENCOUNTER — OFFICE VISIT (OUTPATIENT)
Dept: INTERNAL MEDICINE | Facility: CLINIC | Age: 74
End: 2019-09-03

## 2019-09-03 VITALS
HEIGHT: 61 IN | DIASTOLIC BLOOD PRESSURE: 70 MMHG | WEIGHT: 139 LBS | BODY MASS INDEX: 26.24 KG/M2 | OXYGEN SATURATION: 98 % | HEART RATE: 58 BPM | SYSTOLIC BLOOD PRESSURE: 124 MMHG

## 2019-09-03 DIAGNOSIS — E78.00 PURE HYPERCHOLESTEROLEMIA: ICD-10-CM

## 2019-09-03 DIAGNOSIS — R00.2 PALPITATIONS: ICD-10-CM

## 2019-09-03 DIAGNOSIS — Z00.00 MEDICARE ANNUAL WELLNESS VISIT, SUBSEQUENT: Primary | ICD-10-CM

## 2019-09-03 DIAGNOSIS — Z78.0 MENOPAUSE: ICD-10-CM

## 2019-09-03 DIAGNOSIS — I10 ESSENTIAL HYPERTENSION: ICD-10-CM

## 2019-09-03 PROCEDURE — G0439 PPPS, SUBSEQ VISIT: HCPCS | Performed by: INTERNAL MEDICINE

## 2019-09-03 PROCEDURE — 99397 PER PM REEVAL EST PAT 65+ YR: CPT | Performed by: INTERNAL MEDICINE

## 2019-09-03 PROCEDURE — 93000 ELECTROCARDIOGRAM COMPLETE: CPT | Performed by: INTERNAL MEDICINE

## 2019-09-03 NOTE — PROGRESS NOTES
ANNUAL WELLNESS VISIT    DRUG AND ALCOHOL USE      no tobacco use, no caffeine use and alcohol intake:social drinker, less than 1 drink per week    DIET AND PHYSICAL ACTIVITY     Diet: general    Exercise: frequently   Exercise Details: walking    MOOD DISORDER AND COGNITIVE SCREENING   Depression Screening Tool Used yes - see PHQ-9; components reviewed with patient; reviewed PHQ 9 questionnaire items -patient denies feeling depressed or not having pleasure in doing things; denies any issues with sleep or fatigue or appetite; mood is stable; spends time with family and friends; stays socially and physically active; no concerns today   Anxiety Screening Tool Used yes     Mini-Cog Performed   Yes    1. Tell Patient 3 Words apple table kelly    2. Administer Clock Test normal    3. Recall 3 words  apple table kelly    4. Number Correct Items 3    FUNCTIONAL ABILITY AND LEVEL OF SAFETY   Hearing no hearing loss     Wears Hearing Aids No       Current Activities Independent      none  - see Funct/Cog Status Intake     Fall Risk Assessment       Has difficulty with walking or balance  No         Timed Up and Go (TUG) Test  10 sec.       If >12 sec, normal    ADVANCED DIRECTIVE Patient has an advance directive - a copy has been provided and is visible in patient header    PAIN SCREENING Do you have pain right now? no      If so, 1-10 scale: 0          Do you have pain every day? No      Probable chronic pain: No     Recent Hospitalizations:  No recent hospitalization(s)..     MEDICATION REVIEW   - updated and reviewed (see Medication List).   - reviewed for potentially harmful drug-disease interactions in the elderly.   - reviewed for high risk medications in the elderly.   - aspirin use: Yes    BMI  Body mass index is 26.26 kg/m².    Patient's Body mass index is 26.26 kg/m². BMI is within normal parameters. No follow-up required..    _________________________________________________________  Chief Complaint   Patient  "presents with   • Medicare Wellness-subsequent   • Hyperlipidemia       History of Present Illness  73 y.o.  woman presents for updated physical examination and wellness visit.  States that she has obtained both shingles vaccines.  Notes palpitations have resolved with addition of acebutolol.  Completely asymptomatic at this time.  Was evaluated by Dr. Staton back in October 2018.  Wonders whether she will need to continue the medication.    Review of Systems  Denies headaches, visual changes, CP, palpitations (resolved with acebutolol), SOB, cough, abd pain, n/v/d, difficulty with urination, numbness/tingling, falls, mood changes, lightheadedness, hearing changes, rashes.    Denies vaginal discharge or bleeding (no periods) or breast concerns.   All other ROS reviewed and negative.    [facility]  The following portions of the patient's history were reviewed and updated as appropriate: allergies, current medications, past family history, past medical history, past social history, past surgical history and problem list.      Current Outpatient Medications:   •  acebutolol (SECTRAL) 200 MG QD  •  aspirin 81 MG EC tablet, D  •  Biotin 5000 MCG capsule, QD  •  calcium carbonate-vitamin d 600-400 MG-UNIT QD  •  conjugated estrogens (PREMARIN) 0.625 MG/GM vaginal cream, weekly  •  Multiple Vitamins-Minerals (MULTIVITAMIN ADULT PO), QD    VITALS:  /70   Pulse 58   Ht 154.9 cm (61\")   Wt 63 kg (139 lb)   SpO2 98%   BMI 26.26 kg/m²     Physical Exam   Constitutional: She is oriented to person, place, and time. She appears well-developed and well-nourished.   HENT:   Head: Normocephalic.   Right Ear: External ear normal.   Left Ear: External ear normal.   Nose: Nose normal.   Mouth/Throat: Oropharynx is clear and moist and mucous membranes are normal. No oropharyngeal exudate.   Eyes: Conjunctivae and EOM are normal. Pupils are equal, round, and reactive to light.   Wears glasses   Neck: Normal range of motion. " Neck supple. Carotid bruit is not present (bilaterally). No thyromegaly present.   Cardiovascular: Normal rate, regular rhythm and normal heart sounds.   No ectopy   Pulmonary/Chest: Effort normal and breath sounds normal. No respiratory distress. She has no wheezes. She has no rales.   Abdominal: Soft. Bowel sounds are normal. She exhibits no distension and no mass. There is no hepatosplenomegaly. There is no tenderness.   Genitourinary:   Genitourinary Comments: Breast exam unremarkable without masses, skin changes, nipple discharge, or axillary adenopathy.     Musculoskeletal: Normal range of motion. She exhibits no edema.   Lymphadenopathy:     She has no cervical adenopathy.   Neurological: She is alert and oriented to person, place, and time. She displays normal reflexes. No cranial nerve deficit.   Skin: Skin is warm and dry. No rash noted.   Psychiatric: She has a normal mood and affect. Her behavior is normal.   Nursing note and vitals reviewed.      LABS  Results for orders placed or performed in visit on 08/28/19   Comprehensive Metabolic Panel   Result Value Ref Range    Glucose 91 65 - 99 mg/dL    BUN 12 8 - 23 mg/dL    Creatinine 0.79 0.57 - 1.00 mg/dL    Sodium 138 136 - 145 mmol/L    Potassium 4.6 3.5 - 5.2 mmol/L    Chloride 100 98 - 107 mmol/L    CO2 26.1 22.0 - 29.0 mmol/L    Calcium 10.1 8.6 - 10.5 mg/dL    Total Protein 7.1 6.0 - 8.5 g/dL    Albumin 4.60 3.50 - 5.20 g/dL    ALT (SGPT) 22 1 - 33 U/L    AST (SGOT) 25 1 - 32 U/L    Alkaline Phosphatase 51 39 - 117 U/L    Total Bilirubin 0.7 0.2 - 1.2 mg/dL    eGFR Non African Amer 71 >60 mL/min/1.73    Globulin 2.5 gm/dL    A/G Ratio 1.8 g/dL    BUN/Creatinine Ratio 15.2 7.0 - 25.0    Anion Gap 11.9 5.0 - 15.0 mmol/L   Lipid Panel   Result Value Ref Range    Total Cholesterol 187 0 - 200 mg/dL    Triglycerides 65 0 - 150 mg/dL    HDL Cholesterol 69 (H) 40 - 60 mg/dL    LDL Cholesterol  105 (H) 0 - 100 mg/dL    VLDL Cholesterol 13 5 - 40 mg/dL     LDL/HDL Ratio 1.52    TSH   Result Value Ref Range    TSH 1.480 0.270 - 4.200 mIU/mL   CBC Auto Differential   Result Value Ref Range    WBC 4.86 3.40 - 10.80 10*3/mm3    RBC 4.58 3.77 - 5.28 10*6/mm3    Hemoglobin 13.4 12.0 - 15.9 g/dL    Hematocrit 43.2 34.0 - 46.6 %    MCV 94.3 79.0 - 97.0 fL    MCH 29.3 26.6 - 33.0 pg    MCHC 31.0 (L) 31.5 - 35.7 g/dL    RDW 14.1 12.3 - 15.4 %    RDW-SD 48.6 37.0 - 54.0 fl    MPV 9.6 6.0 - 12.0 fL    Platelets 271 140 - 450 10*3/mm3    Neutrophil % 56.4 42.7 - 76.0 %    Lymphocyte % 29.4 19.6 - 45.3 %    Monocyte % 9.9 5.0 - 12.0 %    Eosinophil % 3.3 0.3 - 6.2 %    Basophil % 0.8 0.0 - 1.5 %    Immature Grans % 0.2 0.0 - 0.5 %    Neutrophils, Absolute 2.74 1.70 - 7.00 10*3/mm3    Lymphocytes, Absolute 1.43 0.70 - 3.10 10*3/mm3    Monocytes, Absolute 0.48 0.10 - 0.90 10*3/mm3    Eosinophils, Absolute 0.16 0.00 - 0.40 10*3/mm3    Basophils, Absolute 0.04 0.00 - 0.20 10*3/mm3    Immature Grans, Absolute 0.01 0.00 - 0.05 10*3/mm3    nRBC 0.0 0.0 - 0.2 /100 WBC   Urinalysis without microscopic (no culture) - Urine, Clean Catch   Result Value Ref Range    Color, UA Yellow Yellow, Straw    Appearance, UA Clear Clear    pH, UA 8.0 5.0 - 8.0    Specific Gravity, UA 1.012 1.005 - 1.030    Glucose, UA Negative Negative    Ketones, UA Negative Negative    Bilirubin, UA Negative Negative    Blood, UA Negative Negative    Protein, UA Negative Negative    Leuk Esterase, UA Negative Negative    Nitrite, UA Negative Negative    Urobilinogen, UA 0.2 E.U./dL 0.2 - 1.0 E.U./dL   Urinalysis, Microscopic Only - Urine, Clean Catch   Result Value Ref Range    RBC, UA 0-2 None Seen, 0-2 /HPF    WBC, UA 0-2 None Seen, 0-2 /HPF    Bacteria, UA None Seen None Seen /HPF    Squamous Epithelial Cells, UA 3-6 (A) None Seen, 0-2 /HPF    Hyaline Casts, UA 0-2 None Seen /LPF    Methodology Automated Microscopy        ECG 12 Lead  Date/Time: 9/3/2019 4:43 PM  Performed by: Gabby Kebede MD  Authorized by: Delio  MD Gabby   Comparison: compared with previous ECG from 12/12/2018  Similar to previous ECG  Rhythm: sinus rhythm  Rate: normal  BPM: 58  Conduction: conduction normal  ST Segments: ST segments normal  T Waves: T waves normal  QRS axis: normal  Clinical impression comment: stable EKG              ASSESSMENT/PLAN  Problem List Items Addressed This Visit     Palpitations     Resolved, asx on acebutolol 200mg QD; RX per Dr. Staton         Menopause    Relevant Medications    conjugated estrogens (PREMARIN) 0.625 MG/GM vaginal cream    Medicare annual wellness visit, subsequent - Primary     Health maintenance - flu vacc to be done at pharmacy(ran out of high-dose in the office today); Prevnar 7/15, PVX 5/11, due for Td (Tdap 2009) - also rec getting at pharmacy; Zostavax 2010; Shingrix done per patient; rec HAV - also get at pharmacy; mammo after 10/31/19; no further Paps unless abnlities; DEXA 7/18, repeat 2021; colonosc 12/17, repeat 2022 per Dr. Link; discussed considerations for screening tests after 75 - can review in further detail next yr; eye exam with Dr. Dr. Keenan 10/18; dental exam q6 mos; (+) seat belt use    Consultants:  Patient Care Team:  Gabby Kebede MD as PCP - General  Gabby Kebede MD as PCP - Internal Medicine  Krystle Figueredo APRN as PCP - Claims Attributed  Casey Link MD as Consulting Physician (Gastroenterology)  Anuel Diaz MD as Consulting Physician (Orthopedic Surgery)  Matthew Vanegas MD as Consulting Physician (Orthopedic Surgery)  FLOR Ying Jr., MD as Consulting Physician (Ophthalmology)  Melia Keenan MD as Consulting Physician (Ophthalmology)  Raghavendra Coburn MD as Consulting Physician (Otolaryngology)  John Staton MD as Consulting Physician (Cardiology)  Nestor Mcdaniels MD as Consulting Physician (Pediatric Allergy and Immunology)             Hyperlipidemia     Lipids remain stable, at goal with ; no meds          Essential hypertension     BP stable, at goal, no meds except acebutolol for palpitations         Relevant Orders    ECG 12 Lead          FOLLOW-UP  RTC 1yr for annual wellness visit; fasting labs the week prior to appt (CBC, CMP, TSH, lipids, UA/micro)      Electronically signed by:    Gabby Kebede MD  09/03/2019

## 2019-09-30 ENCOUNTER — TRANSCRIBE ORDERS (OUTPATIENT)
Dept: PREADMISSION TESTING | Facility: HOSPITAL | Age: 74
End: 2019-09-30

## 2019-09-30 DIAGNOSIS — Z12.31 VISIT FOR SCREENING MAMMOGRAM: Primary | ICD-10-CM

## 2019-10-28 ENCOUNTER — OFFICE VISIT (OUTPATIENT)
Dept: INTERNAL MEDICINE | Facility: CLINIC | Age: 74
End: 2019-10-28

## 2019-10-28 VITALS
DIASTOLIC BLOOD PRESSURE: 76 MMHG | BODY MASS INDEX: 26.06 KG/M2 | WEIGHT: 138 LBS | OXYGEN SATURATION: 99 % | HEART RATE: 65 BPM | SYSTOLIC BLOOD PRESSURE: 130 MMHG | HEIGHT: 61 IN

## 2019-10-28 DIAGNOSIS — I10 ESSENTIAL HYPERTENSION: ICD-10-CM

## 2019-10-28 DIAGNOSIS — R31.9 HEMATURIA, UNSPECIFIED TYPE: Primary | ICD-10-CM

## 2019-10-28 LAB
BILIRUB BLD-MCNC: NEGATIVE MG/DL
CLARITY, POC: ABNORMAL
COLOR UR: ABNORMAL
GLUCOSE UR STRIP-MCNC: NEGATIVE MG/DL
KETONES UR QL: ABNORMAL
LEUKOCYTE EST, POC: ABNORMAL
NITRITE UR-MCNC: POSITIVE MG/ML
PH UR: 7 [PH] (ref 5–8)
PROT UR STRIP-MCNC: ABNORMAL MG/DL
RBC # UR STRIP: ABNORMAL /UL
SP GR UR: 1 (ref 1–1.03)
UROBILINOGEN UR QL: NORMAL

## 2019-10-28 PROCEDURE — 87086 URINE CULTURE/COLONY COUNT: CPT | Performed by: INTERNAL MEDICINE

## 2019-10-28 PROCEDURE — 81003 URINALYSIS AUTO W/O SCOPE: CPT | Performed by: INTERNAL MEDICINE

## 2019-10-28 PROCEDURE — 87088 URINE BACTERIA CULTURE: CPT | Performed by: INTERNAL MEDICINE

## 2019-10-28 PROCEDURE — 99214 OFFICE O/P EST MOD 30 MIN: CPT | Performed by: INTERNAL MEDICINE

## 2019-10-28 PROCEDURE — 87186 SC STD MICRODIL/AGAR DIL: CPT | Performed by: INTERNAL MEDICINE

## 2019-10-28 RX ORDER — SULFAMETHOXAZOLE AND TRIMETHOPRIM 800; 160 MG/1; MG/1
1 TABLET ORAL 2 TIMES DAILY
Qty: 10 TABLET | Refills: 0 | Status: SHIPPED | OUTPATIENT
Start: 2019-10-28 | End: 2019-11-02

## 2019-10-28 NOTE — PROGRESS NOTES
"Chief Complaint   Patient presents with   • Blood in Urine       History of Present Illness  74 y.o.  woman presents for further eval of poss UTI.  HPI started yesterday with pelvic pressure, improving with drinking lots of water.  Reports urgency this morning with frequency, followed by BRB in the urine.  Next urination after that did not show any blood.  Urine sample here in the office had lots of blood, no clots. Has had some abd cramping and pain with urination today.  Denies fevers.  Reports last 2 UTIs were in 3/18 and 5/18.     Review of Systems  ROS (+) for dysuria with frequency, urgency, ilnee hematuria, and pelvis pressure/cramping.  Denies fevers, bowel changes. All other ROS reviewed and negative.    [facility]  The following portions of the patient's history were reviewed and updated as appropriate: allergies, current medications, past family history, past medical history, past social history, past surgical history and problem list.      Current Outpatient Medications:   •  acebutolol (SECTRAL) 200 MG QD  •  aspirin 81 MG EC tablet, QD  •  Biotin 5000 MCG capsule, QD  •  calcium carbonate-vitamin d 600-400 MG-UNIT QD  •  conjugated estrogens (PREMARIN) 0.625 MG/GM vaginal cream 1x/wk  •  Multiple Vitamins-Minerals (MULTIVITAMIN ADULT PO), QD      VITALS:  /76   Pulse 65   Ht 154.9 cm (61\")   Wt 62.6 kg (138 lb)   SpO2 99%   BMI 26.07 kg/m²     Physical Exam   Constitutional: She is oriented to person, place, and time. She appears well-developed and well-nourished.   Eyes: Conjunctivae and EOM are normal.   Wears glasses   Cardiovascular: Normal rate, regular rhythm and normal heart sounds.   Pulmonary/Chest: Effort normal and breath sounds normal. No respiratory distress.   Abdominal: Soft. Bowel sounds are normal. She exhibits no distension. There is no tenderness. There is no guarding and no CVA tenderness.   Neurological: She is alert and oriented to person, place, and time. "   Psychiatric: She has a normal mood and affect. Her behavior is normal.   Nursing note and vitals reviewed.      LABS  Results for orders placed or performed in visit on 10/28/19   POC Urinalysis Dipstick, Automated   Result Value Ref Range    Color Orange (A) Yellow, Straw, Dark Yellow, Rachel    Clarity, UA Cloudy (A) Clear    Specific Gravity  1.005 1.005 - 1.030    pH, Urine 7.0 5.0 - 8.0    Leukocytes 500 Real/ul (A) Negative    Nitrite, UA Positive (A) Negative    Protein,  mg/dL (A) Negative mg/dL    Glucose, UA Negative Negative, 1000 mg/dL (3+) mg/dL    Ketones, UA 15 mg/dL (A) Negative    Urobilinogen, UA Normal Normal    Bilirubin Negative Negative    Blood,  Babatunde/ul (A) Negative       ASSESSMENT/PLAN  Problem List Items Addressed This Visit     Essential hypertension     BP remains stable           Other Visit Diagnoses     UTI    -  Primary    sx with freq/urg, dysuria, and ilene hematuria; RX bactrim DS BID x 5d; await ucx results; rec lots of water, consider cranberry tabs; f/u prn    Relevant Medications    sulfamethoxazole-trimethoprim (BACTRIM DS,SEPTRA DS) 800-160 MG per tablet    Other Relevant Orders    POC Urinalysis Dipstick, Automated (Completed)    Urine Culture - Urine, Urine, Clean Catch          FOLLOW-UP  1. Health maintenance - has had flu vacc this season and updated Td (will get us date); planning to get hepatitis A  2. RTC for next wellness after 9/3/20; fasting labs prior to appt (CBC, CMP, TSH, lipids, UA/micr)      Electronically signed by:    Gabby Kebede MD  10/28/2019

## 2019-10-30 LAB — BACTERIA SPEC AEROBE CULT: ABNORMAL

## 2019-10-30 NOTE — PROGRESS NOTES
Urine culture did not show enough bacteria to warrant abx therapy (cultures need to grow > 100,000 colonies to be clinically significant).  Please d/c abx at this time.  Continue to drink enough water on daily basis.    If symptoms persist, return for follow-up UA/micro

## 2019-11-12 ENCOUNTER — APPOINTMENT (OUTPATIENT)
Dept: MAMMOGRAPHY | Facility: HOSPITAL | Age: 74
End: 2019-11-12

## 2019-11-12 ENCOUNTER — HOSPITAL ENCOUNTER (OUTPATIENT)
Dept: MAMMOGRAPHY | Facility: HOSPITAL | Age: 74
Discharge: HOME | End: 2019-11-12
Admitting: INTERNAL MEDICINE

## 2019-11-12 DIAGNOSIS — Z12.31 VISIT FOR SCREENING MAMMOGRAM: ICD-10-CM

## 2019-11-12 PROCEDURE — 77063 BREAST TOMOSYNTHESIS BI: CPT

## 2019-11-12 PROCEDURE — 77063 BREAST TOMOSYNTHESIS BI: CPT | Performed by: RADIOLOGY

## 2019-11-12 PROCEDURE — 77067 SCR MAMMO BI INCL CAD: CPT | Performed by: RADIOLOGY

## 2019-11-12 PROCEDURE — 77067 SCR MAMMO BI INCL CAD: CPT

## 2019-11-27 RX ORDER — ACEBUTOLOL HYDROCHLORIDE 200 MG/1
CAPSULE ORAL
Qty: 90 CAPSULE | Refills: 1 | Status: SHIPPED | OUTPATIENT
Start: 2019-11-27 | End: 2020-04-20

## 2019-12-09 ENCOUNTER — APPOINTMENT (OUTPATIENT)
Dept: MAMMOGRAPHY | Facility: HOSPITAL | Age: 74
End: 2019-12-09

## 2020-01-08 ENCOUNTER — OFFICE VISIT (OUTPATIENT)
Dept: CARDIOLOGY | Facility: CLINIC | Age: 75
End: 2020-01-08

## 2020-01-08 VITALS
SYSTOLIC BLOOD PRESSURE: 124 MMHG | DIASTOLIC BLOOD PRESSURE: 70 MMHG | WEIGHT: 143.2 LBS | HEIGHT: 62 IN | HEART RATE: 58 BPM | BODY MASS INDEX: 26.35 KG/M2

## 2020-01-08 DIAGNOSIS — E78.5 DYSLIPIDEMIA: ICD-10-CM

## 2020-01-08 DIAGNOSIS — I10 ESSENTIAL HYPERTENSION: ICD-10-CM

## 2020-01-08 DIAGNOSIS — R00.2 PALPITATIONS: Primary | ICD-10-CM

## 2020-01-08 PROCEDURE — 99214 OFFICE O/P EST MOD 30 MIN: CPT | Performed by: INTERNAL MEDICINE

## 2020-01-08 NOTE — PROGRESS NOTES
Subjective:     Encounter Date:01/08/2020    Patient ID: Brian Pablo is a 74 y.o.  white female from [place], [place], retired speech pathologist.     REFERRING HEALTHCARE PROVIDER: SHIRAZ Wakefield  INTERNIST:  Gabby Kebede MD  NEUROSURGEON: Jw Hunter MD    Chief Complaint:   Chief Complaint   Patient presents with   • Palpitations     Problem List:  1. Palpitations:  a. Holter monitor, 11/01/2018: Demonstrated sinus rhythm, 8 single VEs, 135 single SVEs, 10 paired.  Symptoms consistent with PACs and PVCs which are less than 1%  b. Residual class I symptoms with acceptable EKG  c. Echocardiogram, 12/21/2018: EF 0.68. Mild-to-moderate TR. Left ventricular diastolic dysfunction is abnormal consistent with: age. No evidence of pericardial effusion. Mild pulmonary hypertension.   d. Residual class I symptoms, January 2019, June 2019  2. Hyperlipidemia; ASCVD 10-year risk 14.7%, 10.8% if treated  3. Positional vertigo  4. Osteoarthritis  5. Surgical history:   a. Colonoscopy with polyp removal  b. C5-C6 fusion    No Known Allergies      Current Outpatient Medications:   •  acebutolol (SECTRAL) 200 MG capsule, TAKE 1 CAPSULE BY MOUTH ONCE DAILY, Disp: 90 capsule, Rfl: 1  •  aspirin 81 MG EC tablet, Take 81 mg by mouth Daily., Disp: , Rfl:   •  Biotin 5000 MCG capsule, Take 1 tablet by mouth daily., Disp: , Rfl:   •  calcium carbonate-vitamin d 600-400 MG-UNIT per tablet, Take  by mouth Daily. Take as Directed, Disp: , Rfl:   •  conjugated estrogens (PREMARIN) 0.625 MG/GM vaginal cream, Insert into the vagina 1 (One) Time Per Week. As Directed once a week, Disp: 30 g, Rfl: 1  •  CRANBERRY SOFT PO, Take 4,200 mg by mouth Daily., Disp: , Rfl:   •  Multiple Vitamins-Minerals (MULTIVITAMIN ADULT PO), Take 1 tablet by mouth daily., Disp: , Rfl:     HISTORY OF PRESENT ILLNESS: Patient returns for scheduled 6-1/2 month followup. She hand carries a blood pressure log with her today, and these readings  "are reviewed with her today.  They range from /67-81.  She has not noticed her heart skipping or going fast.  She occasionally feels a \"twinge\" in her chest, but it goes away quickly and does not affect her breathing.  She is up and about on a daily basis and has no chest pain, tightness, or pressure.  She walks every day for 30 minutes, about a mile, and she has no problems with her exercise.  She had a flu shot in the fall.  She has had no interim ER visits, hospitalizations, serious illnesses, or surgeries.  She and her  spent [place] with their daughter's family in [place].  Patient otherwise denies chest pain, shortness of breath, PND, edema, palpitations, syncope or presyncope at this time.  They will be on a cruise on the [employer] in June 2020.        Review of Systems   HENT: Positive for tinnitus.    Cardiovascular: Positive for chest pain (rarely) and dyspnea on exertion (with strenuous activity).   Allergic/Immunologic:        Food sensitivities      All other systems reviewed and otherwise negative.    Procedures       Objective:       Vitals:    01/08/20 1546 01/08/20 1548 01/08/20 1610   BP: 141/86 141/74 124/70   BP Location: Left arm Left arm Left arm   Patient Position: Sitting Standing Sitting   Pulse: 60 58    Weight: 65 kg (143 lb 3.2 oz)     Height: 157.5 cm (62\")       Body mass index is 26.19 kg/m².   Last weight:  140 lbs.    Physical Exam   Constitutional: She is oriented to person, place, and time. She appears well-developed and well-nourished.   Neck: No JVD present. Carotid bruit is not present. No thyromegaly present.   Cardiovascular: Regular rhythm, S1 normal and S2 normal. Exam reveals no gallop, no S3 and no friction rub.   Murmur heard.   Medium-pitched early systolic murmur is present with a grade of 1/6 at the upper right sternal border.  Pulses:       Dorsalis pedis pulses are 2+ on the right side, and 2+ on the left side.        Posterior tibial pulses are " 2+ on the right side, and 2+ on the left side.   Pulmonary/Chest: Effort normal and breath sounds normal. She has no wheezes. She has no rhonchi. She has no rales.   Abdominal: Soft. She exhibits no mass. There is no hepatosplenomegaly. There is no tenderness. There is no guarding.   Bowel sounds audible x4   Musculoskeletal: Normal range of motion. She exhibits no edema.   Lymphadenopathy:     She has no cervical adenopathy.   Neurological: She is alert and oriented to person, place, and time.   Skin: Skin is warm, dry and intact. No rash noted.   Vitals reviewed.        Lab Review:   Lab Results   Component Value Date    GLUCOSE 91 08/28/2019    BUN 12 08/28/2019    CREATININE 0.79 08/28/2019    EGFRIFNONA 71 08/28/2019    BCR 15.2 08/28/2019    CO2 26.1 08/28/2019    CALCIUM 10.1 08/28/2019    ALBUMIN 4.60 08/28/2019    AST 25 08/28/2019    ALT 22 08/28/2019   Sodium - 138  Potassium - 4.6  Chloride - 100    Lab Results   Component Value Date    WBC 4.86 08/28/2019    HGB 13.4 08/28/2019    HCT 43.2 08/28/2019    MCV 94.3 08/28/2019     08/28/2019       Lab Results   Component Value Date    TSH 1.480 08/28/2019       Lab Results   Component Value Date    CHOL 187 08/28/2019    CHOL 190 08/17/2018     Lab Results   Component Value Date    TRIG 65 08/28/2019    TRIG 55 08/17/2018     Lab Results   Component Value Date    HDL 69 (H) 08/28/2019    HDL 72 (H) 08/17/2018     Lab Results   Component Value Date     (H) 08/28/2019     08/17/2018           Assessment:   Overall continued acceptable course with no new interim cardiopulmonary complaints with good functional status. We will defer additional diagnostic or therapeutic intervention from a cardiac perspective at this time.       Diagnosis Plan   1. Palpitations  Acceptable control on current therapy; continue acebutolol   2. Essential hypertension  Acceptable control on current therapy   3. Dyslipidemia  Overall acceptable control; continue  heart healthy diet and activity.          Plan:         1. Patient to continue current medications and close follow up with the above providers.  2. Tentative cardiology follow up in September 2020, or patient may return sooner PRN.    Transcribed by Xi Nguyen for Dr. John Staton at 4:08 PM on 01/08/2020    IJohn MD, [facility], personally performed the services described in this documentation as scribed by the above named individual in my presence, and it is both accurate and complete. At 4:20 PM on 01/08/2020

## 2020-01-13 PROBLEM — L82.1 SEBORRHEIC KERATOSES: Status: ACTIVE | Noted: 2020-01-13

## 2020-01-13 PROBLEM — L57.0 ACTINIC KERATOSES: Status: ACTIVE | Noted: 2020-01-13

## 2020-01-19 ENCOUNTER — OFFICE VISIT (OUTPATIENT)
Dept: INTERNAL MEDICINE | Facility: CLINIC | Age: 75
End: 2020-01-19

## 2020-01-19 VITALS
TEMPERATURE: 97.3 F | OXYGEN SATURATION: 98 % | DIASTOLIC BLOOD PRESSURE: 86 MMHG | HEART RATE: 57 BPM | HEIGHT: 62 IN | WEIGHT: 144 LBS | SYSTOLIC BLOOD PRESSURE: 130 MMHG | BODY MASS INDEX: 26.5 KG/M2

## 2020-01-19 DIAGNOSIS — R30.0 DYSURIA: ICD-10-CM

## 2020-01-19 DIAGNOSIS — N30.01 ACUTE CYSTITIS WITH HEMATURIA: Primary | ICD-10-CM

## 2020-01-19 LAB
BILIRUB BLD-MCNC: NEGATIVE MG/DL
CLARITY, POC: CLEAR
COLOR UR: YELLOW
GLUCOSE UR STRIP-MCNC: NEGATIVE MG/DL
KETONES UR QL: NEGATIVE
LEUKOCYTE EST, POC: ABNORMAL
NITRITE UR-MCNC: NEGATIVE MG/ML
PH UR: 6 [PH] (ref 5–8)
PROT UR STRIP-MCNC: NEGATIVE MG/DL
RBC # UR STRIP: ABNORMAL /UL
SP GR UR: 1 (ref 1–1.03)
UROBILINOGEN UR QL: NORMAL

## 2020-01-19 PROCEDURE — 99213 OFFICE O/P EST LOW 20 MIN: CPT | Performed by: NURSE PRACTITIONER

## 2020-01-19 PROCEDURE — 81003 URINALYSIS AUTO W/O SCOPE: CPT | Performed by: NURSE PRACTITIONER

## 2020-01-19 RX ORDER — NITROFURANTOIN 25; 75 MG/1; MG/1
100 CAPSULE ORAL EVERY 12 HOURS SCHEDULED
Qty: 10 CAPSULE | Refills: 0 | Status: SHIPPED | OUTPATIENT
Start: 2020-01-19 | End: 2020-01-24

## 2020-01-19 NOTE — PROGRESS NOTES
Chief Complaint   Patient presents with   • Urinary Tract Infection       History of Present Illness    Brian Pablo is a 74 y.o. female who presents today for UTI.    Urinary Tract Infection    This is a new problem. The current episode started today. The problem occurs every urination. The problem has been gradually worsening. The quality of the pain is described as burning. There has been no fever. Associated symptoms include chills and hematuria. Pertinent negatives include no discharge, flank pain, frequency, hesitancy, nausea, sweats, urgency or vomiting. She has tried increased fluids for the symptoms. The treatment provided no relief.       PMSFH    The following portions of the patient's history were reviewed and updated as appropriate: allergies, current medications, past family history, past medical history, past social history, past surgical history and problem list.     Social History     Tobacco Use   • Smoking status: Never Smoker   • Smokeless tobacco: Never Used   Substance Use Topics   • Alcohol use: No       Past Medical History:   Diagnosis Date   • Benign polyp of large intestine     colonosc 2004   • Hx of bone density study 06/2012    DEXA 6/12 nl   • Hx of bone density study 10/31/2018    DEXA (10/31/18) L2.2, H-0.9, A-1.0, repeat 3 yrs   • Hx of colonoscopy 06/2012    nl colonoscopy 6/12, repeat in 5 yrs, per GI- Dr. Link   • Hx of colonoscopy 12/04/2017    colonosc (12/4/17): hyperplastic polyp, repeat 5 yrs; GI - Dr. Link   • Hx of Holter monitor 11/13/2018    nl Holter (11/13/18) except for rare PACs/PVCs, which correlated with patient sxs   • Hyperplastic colon polyp 12/7/2017   • Loss of hair 8/24/2016    Impression: 07/23/2015 - likely aging with component from menopause; agree with hair/nail supplement; rec derm consultation if she wants further eval;        Past Surgical History:   Procedure Laterality Date   • CERVICAL FUSION  2006    C4-5 fusion secondary to crush  disk (06); NS - Dr. Hunter, Dr. Guadarrama   • COLONOSCOPY  2012, 2017    normal; 2017 polyps removed   • SPINE SURGERY  2006    frontal spinal fusion   • TRIGGER FINGER RELEASE  12/2011    R. thumb trigger finger release (12/11); hand ortho- Dr. Vanegas   • TUBAL ABDOMINAL LIGATION  1975       Family History   Problem Relation Age of Onset   • Diabetes Father    • Stroke Father    • Heart disease Father    • Breast cancer Sister 45   • Angina Maternal Grandmother    • Heart disease Maternal Grandmother    • Stroke Paternal Grandmother 39   • Arthritis Mother    • Cancer Mother    • Breast cancer Mother 67   • Cancer Sister    • Colon cancer Neg Hx        Allergies   Allergen Reactions   • Sulfamethoxazole GI Intolerance         Current Outpatient Medications:   •  acebutolol (SECTRAL) 200 MG capsule, TAKE 1 CAPSULE BY MOUTH ONCE DAILY, Disp: 90 capsule, Rfl: 1  •  aspirin 81 MG EC tablet, Take 81 mg by mouth Daily., Disp: , Rfl:   •  Biotin 5000 MCG capsule, Take 1 tablet by mouth daily., Disp: , Rfl:   •  calcium carbonate-vitamin d 600-400 MG-UNIT per tablet, Take  by mouth Daily. Take as Directed, Disp: , Rfl:   •  conjugated estrogens (PREMARIN) 0.625 MG/GM vaginal cream, Insert  into the vagina 1 (One) Time Per Week. As Directed once a week, Disp: 30 g, Rfl: 1  •  CRANBERRY SOFT PO, Take 4,200 mg by mouth Daily., Disp: , Rfl:   •  Multiple Vitamins-Minerals (MULTIVITAMIN ADULT PO), Take 1 tablet by mouth daily., Disp: , Rfl:   •  nitrofurantoin, macrocrystal-monohydrate, (MACROBID) 100 MG capsule, Take 1 capsule by mouth Every 12 (Twelve) Hours for 5 days., Disp: 10 capsule, Rfl: 0    Review of Systems  Review of Systems   Constitutional: Positive for chills. Negative for activity change, appetite change, diaphoresis, fatigue and fever.   Eyes: Negative for visual disturbance.   Respiratory: Negative for cough and shortness of breath.    Cardiovascular: Negative for chest pain and palpitations.   Gastrointestinal:  "Negative for abdominal pain, diarrhea, nausea and vomiting.   Genitourinary: Positive for dysuria, hematuria and pelvic pain. Negative for difficulty urinating, flank pain, frequency, hesitancy and urgency.   Musculoskeletal: Negative for myalgias.   Neurological: Negative for dizziness, weakness, light-headedness and headaches.   Psychiatric/Behavioral: Negative for confusion, decreased concentration and sleep disturbance.       Vitals:  Vitals:    01/19/20 1431   BP: 130/86   Pulse: 57   Temp: 97.3 °F (36.3 °C)   TempSrc: Temporal   SpO2: 98%   Weight: 65.3 kg (144 lb)   Height: 157.5 cm (62.01\")       Physical Exam  Physical Exam   Constitutional: She is oriented to person, place, and time. Vital signs are normal. She appears well-developed and well-nourished.   Cardiovascular: Normal rate, regular rhythm and normal heart sounds.   Pulmonary/Chest: Effort normal and breath sounds normal. She has no decreased breath sounds. She has no wheezes. She has no rhonchi. She has no rales.   Abdominal: There is no CVA tenderness.   Neurological: She is alert and oriented to person, place, and time. GCS eye subscore is 4. GCS verbal subscore is 5. GCS motor subscore is 6.   Psychiatric: She has a normal mood and affect. Her speech is normal and behavior is normal. Judgment normal. Cognition and memory are normal.   Nursing note and vitals reviewed.      Labs  Results for orders placed or performed in visit on 01/19/20   POCT urinalysis dipstick, automated   Result Value Ref Range    Color Yellow Yellow, Straw, Dark Yellow, Rachel    Clarity, UA Clear Clear    Specific Gravity  1.005 1.005 - 1.030    pH, Urine 6.0 5.0 - 8.0    Leukocytes Trace (A) Negative    Nitrite, UA Negative Negative    Protein, POC Negative Negative mg/dL    Glucose, UA Negative Negative, 1000 mg/dL (3+) mg/dL    Ketones, UA Negative Negative    Urobilinogen, UA Normal Normal    Bilirubin Negative Negative    Blood,  Babatunde/ul (A) Negative "       Assessment/Plan    Brian was seen today for urinary tract infection.    Diagnoses and all orders for this visit:    Acute cystitis with hematuria  -     nitrofurantoin, macrocrystal-monohydrate, (MACROBID) 100 MG capsule; Take 1 capsule by mouth Every 12 (Twelve) Hours for 5 days.    Dysuria  -     POCT urinalysis dipstick, automated    Unable to obtain urine culture today given weekend. Patient started on antibiotics empirically given presenting symptoms and lab results in office.  Patient advised in adequate water intake, avoidance of excessive caffeine, and use of tylenol/ibuprofen as needed for discomfort.  Advised patient to follow-up for new, worsening, or persistent symptoms for repeat lab and culture.    Plan of care reviewed with patient at conclusion of today's visit. Patient education was provided regarding diagnosis, management, and prescribed or recommended OTC medications. Patient verbalized understanding and agreement with plan of care.     Follow-Up  Return if symptoms worsen or fail to improve.    Electronically Signed By:  SHIRAZ Olson

## 2020-04-20 ENCOUNTER — OFFICE VISIT (OUTPATIENT)
Dept: INTERNAL MEDICINE | Facility: CLINIC | Age: 75
End: 2020-04-20

## 2020-04-20 ENCOUNTER — TELEPHONE (OUTPATIENT)
Dept: INTERNAL MEDICINE | Facility: CLINIC | Age: 75
End: 2020-04-20

## 2020-04-20 ENCOUNTER — HOSPITAL ENCOUNTER (OUTPATIENT)
Dept: GENERAL RADIOLOGY | Facility: HOSPITAL | Age: 75
Discharge: HOME | End: 2020-04-20
Admitting: NURSE PRACTITIONER

## 2020-04-20 VITALS
BODY MASS INDEX: 26.5 KG/M2 | HEART RATE: 58 BPM | SYSTOLIC BLOOD PRESSURE: 132 MMHG | DIASTOLIC BLOOD PRESSURE: 80 MMHG | HEIGHT: 62 IN | WEIGHT: 144 LBS | OXYGEN SATURATION: 99 % | TEMPERATURE: 98.7 F

## 2020-04-20 DIAGNOSIS — M79.642 LEFT HAND PAIN: ICD-10-CM

## 2020-04-20 DIAGNOSIS — M79.642 LEFT HAND PAIN: Primary | ICD-10-CM

## 2020-04-20 PROCEDURE — 73130 X-RAY EXAM OF HAND: CPT

## 2020-04-20 PROCEDURE — 99213 OFFICE O/P EST LOW 20 MIN: CPT | Performed by: NURSE PRACTITIONER

## 2020-04-20 NOTE — PROGRESS NOTES
Chief Complaint   Patient presents with   • Hand Pain     swollen and painful       History of Present Illness    Brian Pablo is a 74 y.o. female who presents today for left hand pain.    Hand Pain    The incident occurred 12 to 24 hours ago. The incident occurred at home. There was no injury mechanism (Patient was cleaning at home and was lifting a lot. Noticed pain and swelling within 1 hour of stopping working.  Denies prior injury to left hand.). The pain is present in the left hand (2nd and 3rd MCP joints). The quality of the pain is described as aching. The pain does not radiate. The pain is at a severity of 6/10. The pain is moderate. Pertinent negatives include no chest pain, muscle weakness, numbness or tingling. The symptoms are aggravated by movement. She has tried NSAIDs and ice for the symptoms. The treatment provided mild relief.        [facility]    The following portions of the patient's history were reviewed and updated as appropriate: allergies, current medications, past family history, past medical history, past social history, past surgical history and problem list.     Social History     Tobacco Use   • Smoking status: Never Smoker   • Smokeless tobacco: Never Used   Substance Use Topics   • Alcohol use: No       Past Medical History:   Diagnosis Date   • Benign polyp of large intestine     colonosc 2004   • Hx of bone density study 06/2012    DEXA 6/12 nl   • Hx of bone density study 10/31/2018    DEXA (10/31/18) L2.2, H-0.9, A-1.0, repeat 3 yrs   • Hx of colonoscopy 06/2012    nl colonoscopy 6/12, repeat in 5 yrs, per GI- Dr. Link   • Hx of colonoscopy 12/04/2017    colonosc (12/4/17): hyperplastic polyp, repeat 5 yrs; GI - Dr. Link   • Hx of Holter monitor 11/13/2018    nl Holter (11/13/18) except for rare PACs/PVCs, which correlated with patient sxs   • Hyperplastic colon polyp 12/7/2017   • Loss of hair 8/24/2016    Impression: 07/23/2015 - likely aging with component from  menopause; agree with hair/nail supplement; rec derm consultation if she wants further eval;        Past Surgical History:   Procedure Laterality Date   • CERVICAL FUSION  2006    C4-5 fusion secondary to crush disk (06); NS - Dr. Hunter, Dr. Guadarrama   • COLONOSCOPY  2012, 2017    normal; 2017 polyps removed   • SPINE SURGERY  2006    frontal spinal fusion   • TRIGGER FINGER RELEASE  12/2011    R. thumb trigger finger release (12/11); hand ortho- Dr. Vanegas   • TUBAL ABDOMINAL LIGATION  1975       Family History   Problem Relation Age of Onset   • Diabetes Father    • Stroke Father    • Heart disease Father    • Breast cancer Sister 45   • Angina Maternal Grandmother    • Heart disease Maternal Grandmother    • Stroke Paternal Grandmother 39   • Arthritis Mother    • Cancer Mother    • Breast cancer Mother 67   • Cancer Sister    • Colon cancer Neg Hx        Allergies   Allergen Reactions   • Sulfamethoxazole GI Intolerance         Current Outpatient Medications:   •  aspirin 81 MG EC tablet, Take 81 mg by mouth Daily., Disp: , Rfl:   •  calcium carbonate-vitamin d 600-400 MG-UNIT per tablet, Take  by mouth Daily. Take as Directed, Disp: , Rfl:   •  conjugated estrogens (PREMARIN) 0.625 MG/GM vaginal cream, Insert  into the vagina 1 (One) Time Per Week. As Directed once a week, Disp: 30 g, Rfl: 1  •  CRANBERRY SOFT PO, Take 4,200 mg by mouth Daily., Disp: , Rfl:   •  Multiple Vitamins-Minerals (MULTIVITAMIN ADULT PO), Take 1 tablet by mouth daily., Disp: , Rfl:     Review of Systems  Review of Systems   Constitutional: Negative for fever.   Respiratory: Negative for cough and shortness of breath.    Cardiovascular: Negative for chest pain and palpitations.   Musculoskeletal: Positive for arthralgias and joint swelling.   Skin: Positive for color change. Negative for rash and wound.   Neurological: Negative for dizziness, tingling, light-headedness, numbness and headaches.   Psychiatric/Behavioral: Negative for sleep  "disturbance.       Vitals:  Vitals:    04/20/20 1034   BP: 132/80   Pulse: 58   Temp: 98.7 °F (37.1 °C)   SpO2: 99%   Weight: 65.3 kg (144 lb)   Height: 157.5 cm (62.01\")   PainSc:   6   PainLoc: Hand       Physical Exam  Physical Exam   Constitutional: Vital signs are normal. She appears well-developed and well-nourished.   Cardiovascular: Normal rate, regular rhythm and normal heart sounds.   Pulmonary/Chest: Effort normal and breath sounds normal. No respiratory distress. She has no decreased breath sounds. She has no wheezes. She has no rhonchi. She has no rales.   Musculoskeletal:        Left hand: She exhibits tenderness and swelling. She exhibits normal range of motion, normal capillary refill, no deformity and no laceration. Normal sensation noted. Decreased sensation is not present in the ulnar distribution, is not present in the medial redistribution and is not present in the radial distribution. Decreased strength noted. She exhibits no finger abduction, no thumb/finger opposition and no wrist extension trouble.        Hands:  Neurological: She is alert. No sensory deficit. She exhibits normal muscle tone. Coordination normal.   Skin: Skin is warm, dry and intact. Capillary refill takes 2 to 3 seconds. No rash noted. There is erythema (Left dorsal hand 2nd and 3rd MCP joints).   Psychiatric: She has a normal mood and affect. Her behavior is normal.       Labs  None this visit    Assessment/Plan    Brian was seen today for hand pain.    Diagnoses and all orders for this visit:    Left hand pain  -     XR Hand 3+ View Left; Future    Xray today for further evaluation. Potential fracture involving left 2nd and 3rd MCP joints. Advised rest, intermittent ice therapy, elevation of left hand, and prn acetaminophen/ibuprofen. Will review imaging results when received and refer as necessary. Discussed stabilizing with ACE bandage and avoidance of heavy lifting, pushing, pulling.     Plan of care reviewed with " patient at conclusion of today's visit. Patient education was provided regarding diagnosis, management, and prescribed or recommended OTC medications. Patient was informed to notify office of any new, worsening, or persistent symptoms. Patient verbalized understanding and agreement with plan of care.     Follow-Up  Return if symptoms worsen or fail to improve.      Electronically Signed By:  SHIRAZ Olson      EMR Dragon/Transcription Disclaimer:  Please note that portions of this encounter note were completed using electronic transcription/translation of spoken language to printed text.  The electronic transcription/translation of spoken language may permit erroneous, or at times, nonsensical words or phrases to be inadvertently transcribed.  Although I have reviewed the note for such errors, some may still exist in this documentation.

## 2020-04-20 NOTE — TELEPHONE ENCOUNTER
Called patient and informed of left hand xray results revealing soft tissue swelling indicative of inflammatory arthritis. Advised continued symptomatic relief with rest, ice, elevations, and NSAIDs prn. Advised patient to F/U with PCP for new, worsening, or persistent symptoms. Patient verbalized understanding and agreement.

## 2020-05-29 RX ORDER — ACEBUTOLOL HYDROCHLORIDE 200 MG/1
CAPSULE ORAL
Qty: 90 CAPSULE | Refills: 3 | Status: SHIPPED | OUTPATIENT
Start: 2020-05-29 | End: 2021-05-14 | Stop reason: DRUGHIGH

## 2020-08-25 ENCOUNTER — TELEPHONE (OUTPATIENT)
Dept: INTERNAL MEDICINE | Facility: CLINIC | Age: 75
End: 2020-08-25

## 2020-08-25 DIAGNOSIS — I10 ESSENTIAL HYPERTENSION: ICD-10-CM

## 2020-08-25 DIAGNOSIS — E78.00 PURE HYPERCHOLESTEROLEMIA: ICD-10-CM

## 2020-08-25 DIAGNOSIS — Z00.00 MEDICARE ANNUAL WELLNESS VISIT, SUBSEQUENT: Primary | ICD-10-CM

## 2020-09-01 ENCOUNTER — LAB (OUTPATIENT)
Dept: LAB | Facility: HOSPITAL | Age: 75
End: 2020-09-01

## 2020-09-01 DIAGNOSIS — I10 ESSENTIAL HYPERTENSION: ICD-10-CM

## 2020-09-01 DIAGNOSIS — E78.00 PURE HYPERCHOLESTEROLEMIA: ICD-10-CM

## 2020-09-01 DIAGNOSIS — Z00.00 MEDICARE ANNUAL WELLNESS VISIT, SUBSEQUENT: ICD-10-CM

## 2020-09-01 LAB
ALBUMIN SERPL-MCNC: 4.2 G/DL (ref 3.5–5.2)
ALBUMIN/GLOB SERPL: 1.5 G/DL
ALP SERPL-CCNC: 48 U/L (ref 39–117)
ALT SERPL W P-5'-P-CCNC: 21 U/L (ref 1–33)
ANION GAP SERPL CALCULATED.3IONS-SCNC: 9.7 MMOL/L (ref 5–15)
AST SERPL-CCNC: 23 U/L (ref 1–32)
BACTERIA UR QL AUTO: ABNORMAL /HPF
BASOPHILS # BLD AUTO: 0.06 10*3/MM3 (ref 0–0.2)
BASOPHILS NFR BLD AUTO: 1.3 % (ref 0–1.5)
BILIRUB SERPL-MCNC: 0.8 MG/DL (ref 0–1.2)
BILIRUB UR QL STRIP: NEGATIVE
BUN SERPL-MCNC: 13 MG/DL (ref 8–23)
BUN/CREAT SERPL: 16 (ref 7–25)
CALCIUM SPEC-SCNC: 10 MG/DL (ref 8.6–10.5)
CHLORIDE SERPL-SCNC: 98 MMOL/L (ref 98–107)
CHOLEST SERPL-MCNC: 194 MG/DL (ref 0–200)
CLARITY UR: CLEAR
CO2 SERPL-SCNC: 25.3 MMOL/L (ref 22–29)
COLOR UR: YELLOW
CREAT SERPL-MCNC: 0.81 MG/DL (ref 0.57–1)
DEPRECATED RDW RBC AUTO: 44.4 FL (ref 37–54)
EOSINOPHIL # BLD AUTO: 0.18 10*3/MM3 (ref 0–0.4)
EOSINOPHIL NFR BLD AUTO: 3.8 % (ref 0.3–6.2)
ERYTHROCYTE [DISTWIDTH] IN BLOOD BY AUTOMATED COUNT: 13.1 % (ref 12.3–15.4)
GFR SERPL CREATININE-BSD FRML MDRD: 69 ML/MIN/1.73
GLOBULIN UR ELPH-MCNC: 2.8 GM/DL
GLUCOSE SERPL-MCNC: 76 MG/DL (ref 65–99)
GLUCOSE UR STRIP-MCNC: NEGATIVE MG/DL
HCT VFR BLD AUTO: 39.7 % (ref 34–46.6)
HCV AB SER DONR QL: NORMAL
HDLC SERPL-MCNC: 69 MG/DL (ref 40–60)
HGB BLD-MCNC: 13 G/DL (ref 12–15.9)
HGB UR QL STRIP.AUTO: NEGATIVE
HYALINE CASTS UR QL AUTO: ABNORMAL /LPF
IMM GRANULOCYTES # BLD AUTO: 0.01 10*3/MM3 (ref 0–0.05)
IMM GRANULOCYTES NFR BLD AUTO: 0.2 % (ref 0–0.5)
KETONES UR QL STRIP: NEGATIVE
LDLC SERPL CALC-MCNC: 112 MG/DL (ref 0–100)
LDLC/HDLC SERPL: 1.62 {RATIO}
LEUKOCYTE ESTERASE UR QL STRIP.AUTO: NEGATIVE
LYMPHOCYTES # BLD AUTO: 1.65 10*3/MM3 (ref 0.7–3.1)
LYMPHOCYTES NFR BLD AUTO: 35 % (ref 19.6–45.3)
MCH RBC QN AUTO: 30.2 PG (ref 26.6–33)
MCHC RBC AUTO-ENTMCNC: 32.7 G/DL (ref 31.5–35.7)
MCV RBC AUTO: 92.3 FL (ref 79–97)
MONOCYTES # BLD AUTO: 0.48 10*3/MM3 (ref 0.1–0.9)
MONOCYTES NFR BLD AUTO: 10.2 % (ref 5–12)
NEUTROPHILS NFR BLD AUTO: 2.33 10*3/MM3 (ref 1.7–7)
NEUTROPHILS NFR BLD AUTO: 49.5 % (ref 42.7–76)
NITRITE UR QL STRIP: NEGATIVE
NRBC BLD AUTO-RTO: 0 /100 WBC (ref 0–0.2)
PH UR STRIP.AUTO: 8.5 [PH] (ref 5–8)
PLATELET # BLD AUTO: 236 10*3/MM3 (ref 140–450)
PMV BLD AUTO: 9.7 FL (ref 6–12)
POTASSIUM SERPL-SCNC: 4.8 MMOL/L (ref 3.5–5.2)
PROT SERPL-MCNC: 7 G/DL (ref 6–8.5)
PROT UR QL STRIP: NEGATIVE
RBC # BLD AUTO: 4.3 10*6/MM3 (ref 3.77–5.28)
RBC # UR: ABNORMAL /HPF
REF LAB TEST METHOD: ABNORMAL
SODIUM SERPL-SCNC: 133 MMOL/L (ref 136–145)
SP GR UR STRIP: 1.01 (ref 1–1.03)
SQUAMOUS #/AREA URNS HPF: ABNORMAL /HPF
TRIGL SERPL-MCNC: 66 MG/DL (ref 0–150)
TSH SERPL DL<=0.05 MIU/L-ACNC: 1.64 UIU/ML (ref 0.27–4.2)
UROBILINOGEN UR QL STRIP: ABNORMAL
VLDLC SERPL-MCNC: 13.2 MG/DL (ref 5–40)
WBC # BLD AUTO: 4.71 10*3/MM3 (ref 3.4–10.8)
WBC UR QL AUTO: ABNORMAL /HPF

## 2020-09-01 PROCEDURE — 80053 COMPREHEN METABOLIC PANEL: CPT | Performed by: INTERNAL MEDICINE

## 2020-09-01 PROCEDURE — 81001 URINALYSIS AUTO W/SCOPE: CPT | Performed by: INTERNAL MEDICINE

## 2020-09-01 PROCEDURE — 80061 LIPID PANEL: CPT | Performed by: INTERNAL MEDICINE

## 2020-09-01 PROCEDURE — 85025 COMPLETE CBC W/AUTO DIFF WBC: CPT | Performed by: INTERNAL MEDICINE

## 2020-09-01 PROCEDURE — 86803 HEPATITIS C AB TEST: CPT | Performed by: INTERNAL MEDICINE

## 2020-09-01 PROCEDURE — 84443 ASSAY THYROID STIM HORMONE: CPT | Performed by: INTERNAL MEDICINE

## 2020-09-05 NOTE — ASSESSMENT & PLAN NOTE
Health maintenance - rec high-dose flu vacc (none available in office today; Prevnar 7/15, PVX 5/11; Tdap 9/19 (repeat Td in 2029); Zostavax 2010; HAV and Shingrix done; mammo due after 11/12/20; no further Paps unless abnlities; DEXA 7/18, repeat 2021; colonosc 12/17, repeat 2022 per Dr. Link; eye exam with Dr. Keenan 1/20; dental exam q6 mos; (+) seat belt use    Consultants:  Patient Care Team:  Gabby Kebede MD as PCP - General  Gabby Kebede MD as PCP - Internal Medicine  Casey Link MD as Consulting Physician (Gastroenterology)  Anuel Diaz MD as Consulting Physician (Orthopedic Surgery)  Matthew Vanegas MD as Consulting Physician (Orthopedic Surgery)  FLOR Ying Jr., MD as Consulting Physician (Ophthalmology)  Melia Keenan MD as Consulting Physician (Ophthalmology)  Raghavendra Coburn MD as Consulting Physician (Otolaryngology)  John Staton MD as Consulting Physician (Cardiology)  Nestor Mcdaniels MD as Consulting Physician (Pediatric Allergy and Immunology)  Delfina Gill APRN as Nurse Practitioner (Dermatology)

## 2020-09-05 NOTE — ASSESSMENT & PLAN NOTE
BP stable; no meds except acebutolol 200mg QD (taken for palpitations); has cards f/u with Dr. Staton pending 9/11/20

## 2020-09-08 ENCOUNTER — OFFICE VISIT (OUTPATIENT)
Dept: INTERNAL MEDICINE | Facility: CLINIC | Age: 75
End: 2020-09-08

## 2020-09-08 ENCOUNTER — TRANSCRIBE ORDERS (OUTPATIENT)
Dept: INTERNAL MEDICINE | Facility: CLINIC | Age: 75
End: 2020-09-08

## 2020-09-08 ENCOUNTER — LAB (OUTPATIENT)
Dept: LAB | Facility: HOSPITAL | Age: 75
End: 2020-09-08

## 2020-09-08 VITALS
WEIGHT: 139 LBS | BODY MASS INDEX: 25.58 KG/M2 | SYSTOLIC BLOOD PRESSURE: 132 MMHG | OXYGEN SATURATION: 99 % | HEART RATE: 60 BPM | HEIGHT: 62 IN | DIASTOLIC BLOOD PRESSURE: 78 MMHG

## 2020-09-08 DIAGNOSIS — E87.1 HYPONATREMIA: ICD-10-CM

## 2020-09-08 DIAGNOSIS — E78.00 PURE HYPERCHOLESTEROLEMIA: ICD-10-CM

## 2020-09-08 DIAGNOSIS — Z78.0 MENOPAUSE: ICD-10-CM

## 2020-09-08 DIAGNOSIS — I10 ESSENTIAL HYPERTENSION: ICD-10-CM

## 2020-09-08 DIAGNOSIS — Z00.00 MEDICARE ANNUAL WELLNESS VISIT, SUBSEQUENT: Primary | ICD-10-CM

## 2020-09-08 DIAGNOSIS — Z12.31 ENCOUNTER FOR SCREENING MAMMOGRAM FOR MALIGNANT NEOPLASM OF BREAST: Primary | ICD-10-CM

## 2020-09-08 LAB
ANION GAP SERPL CALCULATED.3IONS-SCNC: 9.3 MMOL/L (ref 5–15)
BUN SERPL-MCNC: 10 MG/DL (ref 8–23)
BUN/CREAT SERPL: 12.5 (ref 7–25)
CALCIUM SPEC-SCNC: 9.9 MG/DL (ref 8.6–10.5)
CHLORIDE SERPL-SCNC: 97 MMOL/L (ref 98–107)
CO2 SERPL-SCNC: 24.7 MMOL/L (ref 22–29)
CREAT SERPL-MCNC: 0.8 MG/DL (ref 0.57–1)
GFR SERPL CREATININE-BSD FRML MDRD: 70 ML/MIN/1.73
GLUCOSE SERPL-MCNC: 85 MG/DL (ref 65–99)
POTASSIUM SERPL-SCNC: 4.3 MMOL/L (ref 3.5–5.2)
SODIUM SERPL-SCNC: 131 MMOL/L (ref 136–145)

## 2020-09-08 PROCEDURE — G0439 PPPS, SUBSEQ VISIT: HCPCS | Performed by: INTERNAL MEDICINE

## 2020-09-08 PROCEDURE — G0444 DEPRESSION SCREEN ANNUAL: HCPCS | Performed by: INTERNAL MEDICINE

## 2020-09-08 PROCEDURE — 93000 ELECTROCARDIOGRAM COMPLETE: CPT | Performed by: INTERNAL MEDICINE

## 2020-09-08 PROCEDURE — 99497 ADVNCD CARE PLAN 30 MIN: CPT | Performed by: INTERNAL MEDICINE

## 2020-09-08 PROCEDURE — 99397 PER PM REEVAL EST PAT 65+ YR: CPT | Performed by: INTERNAL MEDICINE

## 2020-09-08 PROCEDURE — 80048 BASIC METABOLIC PNL TOTAL CA: CPT | Performed by: INTERNAL MEDICINE

## 2020-09-08 NOTE — PROGRESS NOTES
ANNUAL WELLNESS VISIT    DRUG AND ALCOHOL USE      no alcohol use, no tobacco use and no caffeine use    DIET AND PHYSICAL ACTIVITY     Diet: general    Exercise: daily   Exercise Details: walking    MOOD DISORDER AND COGNITIVE SCREENING   Depression Screening Tool Used yes - see PHQ-9; components reviewed with patient; 15-min counseling done -questionnaire items reviewed with patient; denies feeling down, depressed, hopeless, or not having pleasure in doing things.  Denies having concerns with appetite, energy, sleep, negative thoughts, concentration, slow movements or speech; screening is negative for depression   Anxiety Screening Tool Used yes     Mini-Cog Performed   Yes    1. Tell Patient 3 Words apple table kelly    2. Administer Clock Test normal    3. Recall 3 words  apple table kelly    4. Number Correct Items 3    FUNCTIONAL ABILITY AND LEVEL OF SAFETY   Hearing no hearing loss     Wears Hearing Aids No       Current Activities Independent      none  - see Funct/Cog Status Intake     Fall Risk Assessment       Has difficulty with walking or balance  No         Timed Up and Go (TUG) Test  9 sec.       If >12 sec, normal    ADVANCED DIRECTIVE  Advance Care Planning   ACP discussion was held with the patient during this visit. Patient has an advance directive in EMR which is still valid.   Advance Care Planning Discussion:  16 min or more spent on counseling; patient has advanced directive and living will.  Reviewed desires for end of life care, which is to have comfort care.  Patient does not want extraordinary life-sustaining measures, including no chest compression, shocks, intubation/ventilator use, feeding tube, or prolonged artificial life support.  Reviewed code status options, meanings, desires. Patient 's code status is DNR.   Encouraged patient to ensure family is aware of desires/preferences.      PAIN SCREENING Do you have pain right now? no      If so, 1-10 scale: 0          Do you have pain  "every day? No      Probable chronic pain: No     Recent Hospitalizations:  No recent hospitalization(s)..     MEDICATION REVIEW   - updated and reviewed (see Medication List).   - reviewed for potentially harmful drug-disease interactions in the elderly.   - reviewed for high risk medications in the elderly.   - aspirin use: Yes    BMI  Body mass index is 25.42 kg/m².    Patient's Body mass index is 25.42 kg/m². BMI is above normal parameters. Recommendations include: exercise counseling and nutrition counseling.    _________________________________________________________    Chief Complaint   Patient presents with   • Medicare Wellness-subsequent   • Hyperlipidemia       History of Present Illness  74 y.o.  woman presents for updated phys examination and wellness visit. Has question re: pH on UA on her labs. Has been gardening and staying active.    Reports home BPs are 120s systolic.    Review of Systems  Denies headaches, visual changes, CP, palpitations, SOB, cough, abd pain, n/v/d, difficulty with urination, numbness/tingling, falls, mood changes, lightheadedness, hearing changes, rashes.    Denies vaginal discharge or bleeding (no periods) or breast concerns.   All other ROS reviewed and negative.    [facility]  The following portions of the patient's history were reviewed and updated as appropriate: allergies, current medications, past family history, past medical history, past social history, past surgical history and problem list.    Current Outpatient Medications:   •  acebutolol (SECTRAL) 200 MG QD  •  aspirin 81 MG QD  •  calcium carbonate-vitamin d 600-400 MG-UNIT QD  •  conjugated estrogens (PREMARIN) 0.625 MG/GM vaginal cream 1x/wk  •  CRANBERRY SOFT PO, Take 4,200 mg QD  •  Multiple Vitamins-Minerals (MULTIVITAMIN ADULT PO) QD    VITALS:  /78   Pulse 60   Ht 157.5 cm (62\")   Wt 63 kg (139 lb)   SpO2 99%   BMI 25.42 kg/m²     Physical Exam   Constitutional: She is oriented to person, " place, and time. She appears well-developed and well-nourished.   HENT:   Head: Normocephalic.   Right Ear: External ear normal.   Left Ear: External ear normal.   Nose: Nose normal.   Mouth/Throat: Mucous membranes are normal.   Eyes: Pupils are equal, round, and reactive to light. Conjunctivae and EOM are normal.   Wears glasses   Neck: Normal range of motion. Neck supple. Carotid bruit is not present (bilaterally). No thyromegaly present.   Cardiovascular: Normal rate, regular rhythm and normal heart sounds.   Spider veins BLE   Pulmonary/Chest: Effort normal and breath sounds normal. No respiratory distress. She has no wheezes. She has no rales. She exhibits no tenderness.   Abdominal: Soft. Bowel sounds are normal. She exhibits no distension and no mass. There is no hepatosplenomegaly. There is no tenderness.   Genitourinary:   Genitourinary Comments: Breast exam with diffuse fibrocystic changes bilaterally; otherwise unremarkable without masses, skin changes, nipple discharge, or axillary adenopathy.     Musculoskeletal: She exhibits no edema (BLE).   Normal gait   Lymphadenopathy:     She has no cervical adenopathy.   Neurological: She is alert and oriented to person, place, and time. She displays normal reflexes. No cranial nerve deficit.   Skin: Skin is warm and dry. No rash noted.   Psychiatric: She has a normal mood and affect. Her behavior is normal.   Nursing note and vitals reviewed.      LABS  Results for orders placed or performed in visit on 09/01/20   Comprehensive Metabolic Panel   Result Value Ref Range    Glucose 76 65 - 99 mg/dL    BUN 13 8 - 23 mg/dL    Creatinine 0.81 0.57 - 1.00 mg/dL    Sodium 133 (L) 136 - 145 mmol/L    Potassium 4.8 3.5 - 5.2 mmol/L    Chloride 98 98 - 107 mmol/L    CO2 25.3 22.0 - 29.0 mmol/L    Calcium 10.0 8.6 - 10.5 mg/dL    Total Protein 7.0 6.0 - 8.5 g/dL    Albumin 4.20 3.50 - 5.20 g/dL    ALT (SGPT) 21 1 - 33 U/L    AST (SGOT) 23 1 - 32 U/L    Alkaline Phosphatase  48 39 - 117 U/L    Total Bilirubin 0.8 0.0 - 1.2 mg/dL    eGFR Non African Amer 69 >60 mL/min/1.73    Globulin 2.8 gm/dL    A/G Ratio 1.5 g/dL    BUN/Creatinine Ratio 16.0 7.0 - 25.0    Anion Gap 9.7 5.0 - 15.0 mmol/L   Hepatitis C Antibody   Result Value Ref Range    Hepatitis C Ab Non-Reactive Non-Reactive   Lipid Panel   Result Value Ref Range    Total Cholesterol 194 0 - 200 mg/dL    Triglycerides 66 0 - 150 mg/dL    HDL Cholesterol 69 (H) 40 - 60 mg/dL    LDL Cholesterol  112 (H) 0 - 100 mg/dL    VLDL Cholesterol 13.2 5 - 40 mg/dL    LDL/HDL Ratio 1.62    TSH   Result Value Ref Range    TSH 1.640 0.270 - 4.200 uIU/mL   CBC Auto Differential   Result Value Ref Range    WBC 4.71 3.40 - 10.80 10*3/mm3    RBC 4.30 3.77 - 5.28 10*6/mm3    Hemoglobin 13.0 12.0 - 15.9 g/dL    Hematocrit 39.7 34.0 - 46.6 %    MCV 92.3 79.0 - 97.0 fL    MCH 30.2 26.6 - 33.0 pg    MCHC 32.7 31.5 - 35.7 g/dL    RDW 13.1 12.3 - 15.4 %    RDW-SD 44.4 37.0 - 54.0 fl    MPV 9.7 6.0 - 12.0 fL    Platelets 236 140 - 450 10*3/mm3    Neutrophil % 49.5 42.7 - 76.0 %    Lymphocyte % 35.0 19.6 - 45.3 %    Monocyte % 10.2 5.0 - 12.0 %    Eosinophil % 3.8 0.3 - 6.2 %    Basophil % 1.3 0.0 - 1.5 %    Immature Grans % 0.2 0.0 - 0.5 %    Neutrophils, Absolute 2.33 1.70 - 7.00 10*3/mm3    Lymphocytes, Absolute 1.65 0.70 - 3.10 10*3/mm3    Monocytes, Absolute 0.48 0.10 - 0.90 10*3/mm3    Eosinophils, Absolute 0.18 0.00 - 0.40 10*3/mm3    Basophils, Absolute 0.06 0.00 - 0.20 10*3/mm3    Immature Grans, Absolute 0.01 0.00 - 0.05 10*3/mm3    nRBC 0.0 0.0 - 0.2 /100 WBC   Urinalysis without microscopic (no culture) - Urine, Clean Catch   Result Value Ref Range    Color, UA Yellow Yellow, Straw    Appearance, UA Clear Clear    pH, UA 8.5 (H) 5.0 - 8.0    Specific Gravity, UA 1.009 1.005 - 1.030    Glucose, UA Negative Negative    Ketones, UA Negative Negative    Bilirubin, UA Negative Negative    Blood, UA Negative Negative    Protein, UA Negative Negative     Leuk Esterase, UA Negative Negative    Nitrite, UA Negative Negative    Urobilinogen, UA 0.2 E.U./dL 0.2 - 1.0 E.U./dL   Urinalysis, Microscopic Only - Urine, Clean Catch   Result Value Ref Range    RBC, UA 0-2 None Seen, 0-2 /HPF    WBC, UA 0-2 None Seen, 0-2 /HPF    Bacteria, UA None Seen None Seen /HPF    Squamous Epithelial Cells, UA 3-6 (A) None Seen, 0-2 /HPF    Hyaline Casts, UA 0-2 None Seen /LPF    Methodology Automated Microscopy        ECG 12 Lead  Date/Time: 9/8/2020 8:45 AM  Performed by: Gabby Kebede MD  Authorized by: Gabby Kebede MD   Comparison: compared with previous ECG from 9/3/2019  Similar to previous ECG  Rhythm: sinus rhythm  Rate: normal  BPM: 59  Conduction: conduction normal  ST Segments: ST segments normal  T Waves: T waves normal  QRS axis: normal  Clinical impression comment: stable EKG              ASSESSMENT/PLAN  Problem List Items Addressed This Visit        Cardiovascular and Mediastinum    Hyperlipidemia     Lipids stable with ; no meds         Essential hypertension     BP stable; no meds except acebutolol 200mg QD (taken for palpitations); has cards f/u with Dr. Staton pending 9/11/20            Genitourinary    Menopause     Stable on premarin cream 1x/week #30gm, 1RF         Relevant Medications    conjugated estrogens (PREMARIN) 0.625 MG/GM vaginal cream       Other    Medicare annual wellness visit, subsequent - Primary     Health maintenance - rec high-dose flu vacc (none available in office today; Prevnar 7/15, PVX 5/11; Tdap 9/19 (repeat Td in 2029); Zostavax 2010; HAV and Shingrix done; mammo due after 11/12/20; no further Paps unless abnlities; DEXA 7/18, repeat 2021; colonosc 12/17, repeat 2022 per Dr. Link; eye exam with Dr. Keenan 1/20; dental exam q6 mos; (+) seat belt use    Consultants:  Patient Care Team:  Gabby Kebede MD as PCP - General  Gabby Kebede MD as PCP - Internal Medicine  Nikolay, Casey Malik MD as Consulting Physician  (Gastroenterology)  Anuel Diaz MD as Consulting Physician (Orthopedic Surgery)  Matthew Vanegas MD as Consulting Physician (Orthopedic Surgery)  FLOR Ying Jr., MD as Consulting Physician (Ophthalmology)  Melia Keenan MD as Consulting Physician (Ophthalmology)  Raghavendra Coburn MD as Consulting Physician (Otolaryngology)  John Staton MD as Consulting Physician (Cardiology)  Nestor Mcdaniels MD as Consulting Physician (Pediatric Allergy and Immunology)  Delfina Gill APRN as Nurse Practitioner (Dermatology)               Other Visit Diagnoses     Hyponatremia        repeat BMP on the way out the door    Relevant Orders    Basic Metabolic Panel          FOLLOW-UP  1. Repeat BMP on the way out the door  2. RTC for annual wellness in 1 yr; fasting labs the week prior to appt (CBC, CMP, TSH, lipids, UA/micro)      Electronically signed by:    Gabby Kebede MD, FACP  09/08/2020      EMR Dragon/Transcription disclaimer:  Parts of this encounter note is an electronic transcription/translation of spoken language to printed text. Electronic translation of spoken language may permit erroneous, or at times, nonsensical words or phrases, to be inadvertently transcribed. Although I have reviewed the note for such errors, some may still exist.

## 2020-09-08 NOTE — PROGRESS NOTES
Sodium level is even lower. It is not dangerous but we need to f/u.    Please limit daily fluid intake to 1.5L per day for the next week and repeat BMP with serum osm and urine osm in 1 week - nonfasting is ok; dx: hyponatremia

## 2020-09-09 NOTE — PROGRESS NOTES
Subjective:     Encounter Date:09/11/2020    Patient ID: Brian Pablo is a 74 y.o.  white female from [place], [place], retired speech pathologist.     REFERRING HEALTHCARE PROVIDER: SHIRAZ Wakefield  INTERNIST:  Gabby Kebede MD  NEUROSURGEON: Jw Hunter MD  DERMATOLOGIST: JESSY Mcgregor    Chief Complaint:   Chief Complaint   Patient presents with   • Palpitations     F/U       Problem List:  1. Palpitations:  a. Holter monitor, 11/01/2018: Demonstrated sinus rhythm, 8 single VEs, 135 single SVEs, 10 paired.  Symptoms consistent with PACs and PVCs which are less than 1%  b. Residual class I symptoms with acceptable EKG  c. Echocardiogram, 12/21/2018: EF 0.68. Mild-to-moderate TR. Left ventricular diastolic dysfunction is abnormal consistent with: age. No evidence of pericardial effusion. Mild pulmonary hypertension.   d. Residual class I symptoms, January 2019, June 2019  2. Hyperlipidemia; ASCVD 10-year risk 14.7%, 10.8% if treated  3. Positional vertigo  4. Osteoarthritis  5. Surgical history:   a. Colonoscopy with polyp removal  b. C5-C6 fusion    Allergies   Allergen Reactions   • Sulfamethoxazole GI Intolerance       Current Outpatient Medications:   •  acebutolol (SECTRAL) 200 MG capsule, TAKE 1 CAPSULE BY MOUTH ONCE DAILY, Disp: 90 capsule, Rfl: 3  •  aspirin 81 MG EC tablet, Take 81 mg by mouth Daily., Disp: , Rfl:   •  calcium carbonate-vitamin d 600-400 MG-UNIT per tablet, Take  by mouth Daily. Take as Directed, Disp: , Rfl:   •  conjugated estrogens (PREMARIN) 0.625 MG/GM vaginal cream, Insert  into the vagina 1 (One) Time Per Week. As Directed once a week, Disp: 30 g, Rfl: 1  •  CRANBERRY SOFT PO, Take 4,200 mg by mouth Daily., Disp: , Rfl:   •  Multiple Vitamins-Minerals (MULTIVITAMIN ADULT PO), Take 1 tablet by mouth daily., Disp: , Rfl:     History of Present Illness: Patient returns for scheduled 8-month follow up. She is retired and has been staying socially distant due  "to COVID-19. Patient has been feeling well overall from a cardiovascular standpoint. She has not experienced many palpitations, but has been experiencing episodes that feel like chest heaviness but last less than one minute and are not associated with any additional cardiopulmonary complaints. She recently saw Dr. Kebede and found her sodium to be 133 although it has historically been consistent at 136. Dr. Kebede encouraged her to repeat her blood work when not fasting but her sodium decreased to 131. She then encouraged her to cut back a liter and a half of fluid per day for a week and repeat her blood work. She notes that she had been drinking an increased amount of water due to having two UTI's earlier this year. Patient otherwise denies chest pain, shortness of breath, PND, edema, syncope, or presyncope at this time. She has had no interim ER visits, hospitalizations, serious illnesses, or surgeries. Her blood pressure readings at home have been running 110-128/68-78.        ROS   Obtained and negative except as outlined in problem list and HPI.    Procedures       Objective:       Vitals:    09/11/20 0848 09/11/20 0849 09/11/20 0906   BP: 155/82 128/77 136/72   BP Location: Left arm Left arm Left arm   Patient Position: Sitting Standing Sitting   Pulse: 63 71    Temp: 96.9 °F (36.1 °C)     Weight: 62.5 kg (137 lb 12.8 oz)     Height: 157.5 cm (62\")       Body mass index is 25.2 kg/m².  Last weight: 143 lbs    Physical Exam   Constitutional: She is oriented to person, place, and time. She appears well-developed and well-nourished.   Neck: No JVD present. Carotid bruit is not present. No thyromegaly present.   Cardiovascular: Regular rhythm, S1 normal and S2 normal. Exam reveals no gallop, no S3 and no friction rub.   Murmur heard.   Medium-pitched early systolic murmur is present with a grade of 2/6 at the lower left sternal border.  Pulses:       Dorsalis pedis pulses are 2+ on the right side, and 2+ on the left side. "        Posterior tibial pulses are 2+ on the right side, and 2+ on the left side.   Pulmonary/Chest: Effort normal and breath sounds normal. She has no wheezes. She has no rhonchi. She has no rales.   Abdominal: Soft. She exhibits no mass. There is no hepatosplenomegaly. There is no tenderness. There is no guarding.   Musculoskeletal: Normal range of motion. She exhibits no edema.   Lymphadenopathy:     She has no cervical adenopathy.   Neurological: She is alert and oriented to person, place, and time.   Skin: Skin is warm, dry and intact. No rash noted.   Vitals reviewed.        Lab Review:   Lab Results   Component Value Date    GLUCOSE 85 09/08/2020    BUN 10 09/08/2020    CREATININE 0.80 09/08/2020    EGFRIFNONA 70 09/08/2020    BCR 12.5 09/08/2020     (L) 09/08/2020    K 4.3 09/08/2020    CL 97 (L) 09/08/2020    CO2 24.7 09/08/2020    CALCIUM 9.9 09/08/2020    ALBUMIN 4.20 09/01/2020    AST 23 09/01/2020    ALT 21 09/01/2020       Lab Results   Component Value Date    WBC 4.71 09/01/2020    HGB 13.0 09/01/2020    HCT 39.7 09/01/2020    MCV 92.3 09/01/2020     09/01/2020       Lab Results   Component Value Date    TSH 1.640 09/01/2020       Lab Results   Component Value Date    CHOL 194 09/01/2020    CHOL 187 08/28/2019    CHOL 190 08/17/2018     Lab Results   Component Value Date    TRIG 66 09/01/2020    TRIG 65 08/28/2019    TRIG 55 08/17/2018     Lab Results   Component Value Date    HDL 69 (H) 09/01/2020    HDL 69 (H) 08/28/2019    HDL 72 (H) 08/17/2018     Lab Results   Component Value Date     (H) 09/01/2020     (H) 08/28/2019     08/17/2018     XR left hand, 04/20/2020:   IMPRESSION:  Soft tissue swelling identified of the third metacarpophalangeal joint which an inflammatory arthritis cannot be excluded. No fracture or dislocation.           Assessment:       Overall continued acceptable course with no new interim cardiopulmonary complaints with good functional status.  We will defer additional diagnostic or therapeutic intervention from a cardiac perspective at this time.     Diagnosis Plan   1. Palpitations  Overall acceptable control; Continue current treatment.   2. Essential hypertension  Continue current treatment and monitor blood pressure at home as she has been doing.    3. Hyperlipidemia  Continue a heart healthy diet; 10 year ASCVD risk 17.9% and 12% with treatment.           Plan:         1. Patient to continue current medications and close follow up with the above providers.  2. Tentative cardiology follow up in April or May 2021 or patient may return sooner PRN.      Scribed for John Staton MD by Meghana Webber. 9/11/2020  09:31    I, John Staton MD, [facility], personally performed the services described in this documentation as scribed by the above named individual in my presence, and it is both accurate and complete. At 9:11 AM on 09/11/2020

## 2020-09-10 ENCOUNTER — TELEPHONE (OUTPATIENT)
Dept: INTERNAL MEDICINE | Facility: CLINIC | Age: 75
End: 2020-09-10

## 2020-09-10 DIAGNOSIS — E87.1 HYPONATREMIA: Primary | ICD-10-CM

## 2020-09-10 NOTE — TELEPHONE ENCOUNTER
Pt notified and verbalized understanding, she will come in next week to have the non fasting labs repeated.

## 2020-09-10 NOTE — TELEPHONE ENCOUNTER
----- Message from Gabby Kebede MD sent at 9/8/2020  4:34 PM EDT -----  Sodium level is even lower. It is not dangerous but we need to f/u.    Please limit daily fluid intake to 1.5L per day for the next week and repeat BMP with serum osm and urine osm in 1 week - nonfasting is ok; dx: hyponatremia

## 2020-09-11 ENCOUNTER — OFFICE VISIT (OUTPATIENT)
Dept: CARDIOLOGY | Facility: CLINIC | Age: 75
End: 2020-09-11

## 2020-09-11 VITALS
HEART RATE: 71 BPM | WEIGHT: 137.8 LBS | BODY MASS INDEX: 25.36 KG/M2 | DIASTOLIC BLOOD PRESSURE: 72 MMHG | SYSTOLIC BLOOD PRESSURE: 136 MMHG | TEMPERATURE: 96.9 F | HEIGHT: 62 IN

## 2020-09-11 DIAGNOSIS — R00.2 PALPITATIONS: Primary | ICD-10-CM

## 2020-09-11 DIAGNOSIS — I10 ESSENTIAL HYPERTENSION: ICD-10-CM

## 2020-09-11 DIAGNOSIS — E78.00 PURE HYPERCHOLESTEROLEMIA: ICD-10-CM

## 2020-09-11 PROCEDURE — 99214 OFFICE O/P EST MOD 30 MIN: CPT | Performed by: INTERNAL MEDICINE

## 2020-09-18 ENCOUNTER — LAB (OUTPATIENT)
Dept: LAB | Facility: HOSPITAL | Age: 75
End: 2020-09-18

## 2020-09-18 DIAGNOSIS — E87.1 HYPONATREMIA: ICD-10-CM

## 2020-09-18 PROCEDURE — 80048 BASIC METABOLIC PNL TOTAL CA: CPT | Performed by: INTERNAL MEDICINE

## 2020-09-18 PROCEDURE — 83930 ASSAY OF BLOOD OSMOLALITY: CPT | Performed by: INTERNAL MEDICINE

## 2020-09-18 PROCEDURE — 83935 ASSAY OF URINE OSMOLALITY: CPT | Performed by: INTERNAL MEDICINE

## 2020-09-19 LAB
ANION GAP SERPL CALCULATED.3IONS-SCNC: 9.7 MMOL/L (ref 5–15)
BUN SERPL-MCNC: 12 MG/DL (ref 8–23)
BUN/CREAT SERPL: 13.5 (ref 7–25)
CALCIUM SPEC-SCNC: 9.7 MG/DL (ref 8.6–10.5)
CHLORIDE SERPL-SCNC: 102 MMOL/L (ref 98–107)
CO2 SERPL-SCNC: 26.3 MMOL/L (ref 22–29)
CREAT SERPL-MCNC: 0.89 MG/DL (ref 0.57–1)
GFR SERPL CREATININE-BSD FRML MDRD: 62 ML/MIN/1.73
GLUCOSE SERPL-MCNC: 76 MG/DL (ref 65–99)
OSMOLALITY SERPL: 292 MOSM/KG (ref 280–301)
OSMOLALITY UR: 287 MOSM/KG
POTASSIUM SERPL-SCNC: 4.7 MMOL/L (ref 3.5–5.2)
SODIUM SERPL-SCNC: 138 MMOL/L (ref 136–145)

## 2020-09-21 ENCOUNTER — OFFICE VISIT (OUTPATIENT)
Dept: INTERNAL MEDICINE | Facility: CLINIC | Age: 75
End: 2020-09-21

## 2020-09-21 VITALS
SYSTOLIC BLOOD PRESSURE: 122 MMHG | WEIGHT: 140 LBS | DIASTOLIC BLOOD PRESSURE: 74 MMHG | HEART RATE: 69 BPM | HEIGHT: 62 IN | BODY MASS INDEX: 25.76 KG/M2 | OXYGEN SATURATION: 98 %

## 2020-09-21 DIAGNOSIS — I10 ESSENTIAL HYPERTENSION: ICD-10-CM

## 2020-09-21 DIAGNOSIS — R21 RASH: Primary | ICD-10-CM

## 2020-09-21 DIAGNOSIS — E87.1 HYPONATREMIA: ICD-10-CM

## 2020-09-21 PROCEDURE — 99214 OFFICE O/P EST MOD 30 MIN: CPT | Performed by: INTERNAL MEDICINE

## 2020-09-21 NOTE — PROGRESS NOTES
"Chief Complaint   Patient presents with   • Insect Bite       History of Present Illness  74 y.o.  woman presents for further eval of persistent possible bug bite. HPI started 3-4 weeks when there was a round, red spot with a center at the right mid-shin. Thought it was a bug bite; assoc'd itching resolved with HC cream. It got more red, irreg at the borders, and then developed a lighter center that is hard to touch. Presents because it got a little sore yesterday. No drainage, no further itching, and no assoc'd warmth. No prev similar lesions. Is not getting larger; just is not going away. Used antibiotic ointment as well as aquaphor; neither has helped.     She thinks her sodium issues is due to drinking too much water. Was trying to drink a lot of water due to recurrent UTIs.    Review of Systems  ROS (+) for red spot at the right shin, possible non-healing bug bite. Denies fevers. Denies current itching. All other ROS reviewed and negative.    [facility]  The following portions of the patient's history were reviewed and updated as appropriate: allergies, current medications, past family history, past medical history, past social history, past surgical history and problem list.    Current Outpatient Medications:   •  acebutolol (SECTRAL) 200 MG QD  •  aspirin 81 MG EQD  •  calcium carbonate-vitamin d 600-400 MG-UNIT QD  •  conjugated estrogens (PREMARIN) 0.625 MG/GM vaginal cream weekly  •  CRANBERRY SOFT PO, Take 4,200 mg QD  •  Multiple Vitamins-Minerals (MULTIVITAMIN ADULT PO) QD    VITALS:  /74   Pulse 69   Ht 157.5 cm (62\")   Wt 63.5 kg (140 lb)   SpO2 98%   BMI 25.61 kg/m²     Physical Exam  Vitals signs and nursing note reviewed.   Constitutional:       General: She is not in acute distress.     Appearance: Normal appearance.   Eyes:      Extraocular Movements: Extraocular movements intact.      Conjunctiva/sclera: Conjunctivae normal.      Comments: Wears glasses   Cardiovascular:      Rate " and Rhythm: Normal rate and regular rhythm.      Heart sounds: Normal heart sounds.   Pulmonary:      Effort: Pulmonary effort is normal. No respiratory distress.      Breath sounds: Normal breath sounds.   Skin:     Comments: Right mid-shin with irreg bordered erythematous macule, nickel-sized with rough hypopigmented area at 7:00 position, nonblanching, no assoc'd fluctuance, and minimal tender to palpation at the erythematous area; nonpruritic   Neurological:      Mental Status: She is alert and oriented to person, place, and time. Mental status is at baseline.   Psychiatric:         Mood and Affect: Mood normal.         Behavior: Behavior normal.         LABS  Results for orders placed or performed in visit on 09/18/20   Osmolality, Serum    Specimen: Blood   Result Value Ref Range    Osmolality 292 280 - 301 mOsm/kg   Osmolality, Urine - Urine, Clean Catch    Specimen: Urine, Clean Catch   Result Value Ref Range    Osmolality, Urine 287 mOsm/kg   Basic Metabolic Panel    Specimen: Blood   Result Value Ref Range    Glucose 76 65 - 99 mg/dL    BUN 12 8 - 23 mg/dL    Creatinine 0.89 0.57 - 1.00 mg/dL    Sodium 138 136 - 145 mmol/L    Potassium 4.7 3.5 - 5.2 mmol/L    Chloride 102 98 - 107 mmol/L    CO2 26.3 22.0 - 29.0 mmol/L    Calcium 9.7 8.6 - 10.5 mg/dL    eGFR Non African Amer 62 >60 mL/min/1.73    BUN/Creatinine Ratio 13.5 7.0 - 25.0    Anion Gap 9.7 5.0 - 15.0 mmol/L       ASSESSMENT/PLAN  Problem List Items Addressed This Visit        Cardiovascular and Mediastinum    Essential hypertension     BP normal 122/74           Other Visit Diagnoses     Rash    -  Primary    poss spider bite, non-healing but not acutely inflamed; trial mupirocin oint TID and call back Thurs/Fri if no change, at which time RX oral abx; avoid heat    Relevant Medications    mupirocin (Bactroban) 2 % ointment    Hyponatremia        resolved; patient thinks she was drinking too much waterrec 64-128oz per day would be acceptable;  advised NOT to eat more sodium for hypoNa      cont'd rash/bug bite - if enlarges/worsens, needs OV for f/u before the weekend    FOLLOW-UP  1. Health maintenance - already got flu vacc this season  2. RTC prn; next wellness scheduled 9/14/21; fasting labs the week prior to appt (CBC, CMP, TSH, lipids, UA/micro)      Electronically signed by:    Gabby Kebede MD, FACP  09/21/2020

## 2020-09-28 ENCOUNTER — OFFICE VISIT (OUTPATIENT)
Dept: INTERNAL MEDICINE | Facility: CLINIC | Age: 75
End: 2020-09-28

## 2020-09-28 VITALS
BODY MASS INDEX: 25.76 KG/M2 | HEART RATE: 74 BPM | SYSTOLIC BLOOD PRESSURE: 112 MMHG | OXYGEN SATURATION: 99 % | DIASTOLIC BLOOD PRESSURE: 68 MMHG | WEIGHT: 140 LBS | HEIGHT: 62 IN

## 2020-09-28 DIAGNOSIS — R23.8 PAPULE: Primary | ICD-10-CM

## 2020-09-28 DIAGNOSIS — I10 ESSENTIAL HYPERTENSION: ICD-10-CM

## 2020-09-28 PROCEDURE — 99213 OFFICE O/P EST LOW 20 MIN: CPT | Performed by: INTERNAL MEDICINE

## 2020-09-28 NOTE — PROGRESS NOTES
"Chief Complaint   Patient presents with   • Rash       History of Present Illness  75 y.o.  woman presents for f/u on red spot at the right-mid-shin. Reports it hurts less and has less itching. Thinks perhaps it is less red. The dry lighter stop.     Review of Systems  ROS (+) for persistent right shin, red without drainage. Denies fevers. All other ROS reviewed and negative.    [facility]  The following portions of the patient's history were reviewed and updated as appropriate: allergies, current medications, past family history, past medical history, past social history, past surgical history and problem list.    Current Outpatient Medications:   •  acebutolol (SECTRAL) 200 MG QD  •  aspirin 81 MG EC QD  •  calcium carbonate-vitamin d 600-400 MG-UNIT QD  •  conjugated estrogens (PREMARIN) 0.625 MG/GM vaginal cream weekly  •  CRANBERRY SOFT PO, Take 4,200 mg QD  •  doxycycline (VIBRAMYCIN) 100 MG BID - finishing  •  Multiple Vitamins-Minerals (MULTIVITAMIN ADULT PO) QD  •  mupirocin (Bactroban) 2 % ointment TID      VITALS:  /68   Pulse 74   Ht 157.5 cm (62\")   Wt 63.5 kg (140 lb)   SpO2 99%   BMI 25.61 kg/m²     Physical Exam  Vitals signs and nursing note reviewed.   Constitutional:       General: She is not in acute distress.     Appearance: Normal appearance.   Eyes:      Extraocular Movements: Extraocular movements intact.      Conjunctiva/sclera: Conjunctivae normal.      Comments: Wears glasses   Cardiovascular:      Heart sounds: Normal heart sounds.   Pulmonary:      Effort: Pulmonary effort is normal. No respiratory distress.      Breath sounds: Normal breath sounds. No wheezing.   Skin:     Comments: Right-mid-shin with irreg border nickel-sized papule, blanching erythema, gray dry discoloration at right lower outer aspect, mildly flaky, minimally tender to palpation, no fluctuance   Neurological:      Mental Status: She is alert and oriented to person, place, and time.   Psychiatric:    "      Mood and Affect: Mood normal.         Thought Content: Thought content normal.         LABS  None    ASSESSMENT/PLAN  Problem List Items Addressed This Visit        Cardiovascular and Mediastinum    Essential hypertension     BP remains stable            Other Visit Diagnoses     Papule    -  Primary    RIGHT ant mid-shin; no worsening or improvement with doxy and mupirocin; finish doxy and rec derm consultation for f/u and poss bx; f/u prn          FOLLOW-UP  RTC prn; next wellness scheduled 9/14/21; fasting labs prior to appt (CBC, CMP, TSH, lipids, UA/micr)    Electronically signed by:    Gabby Kebede MD, FACP  09/28/2020      Answers for HPI/ROS submitted by the patient on 9/26/2020   Rash  What is the primary reason for your visit?: Rash

## 2020-10-02 ENCOUNTER — PATIENT MESSAGE (OUTPATIENT)
Dept: INTERNAL MEDICINE | Facility: CLINIC | Age: 75
End: 2020-10-02

## 2020-10-02 PROBLEM — L82.0 BENIGN LICHENOID KERATOSIS: Status: ACTIVE | Noted: 2020-10-02

## 2020-10-02 PROBLEM — L57.0 BENIGN LICHENOID KERATOSIS: Status: ACTIVE | Noted: 2020-10-02

## 2020-10-03 NOTE — TELEPHONE ENCOUNTER
From: Brian Pablo  To: Gabby Kebede MD  Sent: 10/2/2020 5:11 PM EDT  Subject: Test Results Question    Hi Dr. Kebede,  From Dr. Farrar's office: The pathology report shows the area on my right shin is a benign lichenoid keratosis. The Clinical Assistant said I should only apply the steroid cream for itching, otherwise no further treatment. (It hasn't been itchy. I am following his wound treatment protocol for 5 to 7 days which is to wipe the biopsy wound with a solution of 1 tablespoon of white vinegar diluted in 1 pint warm water. Then dry the area and apply a small amount of petroleum jelly and a covering to keep it moist.) I'll see Dr. Farrar on October 28. If you would like a copy of the pathology report I can request that. It still looks about the same but yellowish area is larger.  Thank you for your care.  Brian

## 2020-10-05 NOTE — TELEPHONE ENCOUNTER
From: Brian Pablo  To: Gabby Kebede MD  Sent: 10/2/2020 10:12 PM EDT  Subject: Visit Follow-Up Question    Thank you so much for your reply. I assumed it wouldn't be a quick heal since Dr. Quintana wanted the follow up appointment in a month. That's OK. At least we know what it is and it really isn't bothering me. It sure looks ugly though. That's OK too. You have a good weekend as well. Thanks again for your follow up.  Brian

## 2020-11-16 ENCOUNTER — HOSPITAL ENCOUNTER (OUTPATIENT)
Dept: MAMMOGRAPHY | Facility: HOSPITAL | Age: 75
Discharge: HOME | End: 2020-11-16
Admitting: INTERNAL MEDICINE

## 2020-11-16 DIAGNOSIS — Z12.31 ENCOUNTER FOR SCREENING MAMMOGRAM FOR MALIGNANT NEOPLASM OF BREAST: ICD-10-CM

## 2020-11-16 PROCEDURE — 77063 BREAST TOMOSYNTHESIS BI: CPT

## 2020-11-16 PROCEDURE — 77063 BREAST TOMOSYNTHESIS BI: CPT | Performed by: RADIOLOGY

## 2020-11-16 PROCEDURE — 77067 SCR MAMMO BI INCL CAD: CPT | Performed by: RADIOLOGY

## 2020-11-16 PROCEDURE — 77067 SCR MAMMO BI INCL CAD: CPT

## 2021-01-18 ENCOUNTER — IMMUNIZATION (OUTPATIENT)
Dept: VACCINE CLINIC | Facility: HOSPITAL | Age: 76
End: 2021-01-18

## 2021-01-18 PROCEDURE — 91300 HC SARSCOV02 VAC 30MCG/0.3ML IM: CPT | Performed by: FAMILY MEDICINE

## 2021-01-18 PROCEDURE — 0001A: CPT | Performed by: FAMILY MEDICINE

## 2021-02-08 ENCOUNTER — IMMUNIZATION (OUTPATIENT)
Dept: VACCINE CLINIC | Facility: HOSPITAL | Age: 76
End: 2021-02-08

## 2021-02-08 PROCEDURE — 91300 HC SARSCOV02 VAC 30MCG/0.3ML IM: CPT | Performed by: INTERNAL MEDICINE

## 2021-02-08 PROCEDURE — 0002A: CPT | Performed by: INTERNAL MEDICINE

## 2021-04-16 ENCOUNTER — OFFICE VISIT (OUTPATIENT)
Dept: CARDIOLOGY | Facility: CLINIC | Age: 76
End: 2021-04-16

## 2021-04-16 VITALS
TEMPERATURE: 96 F | WEIGHT: 134 LBS | SYSTOLIC BLOOD PRESSURE: 120 MMHG | BODY MASS INDEX: 24.66 KG/M2 | DIASTOLIC BLOOD PRESSURE: 78 MMHG | HEART RATE: 64 BPM | HEIGHT: 62 IN

## 2021-04-16 DIAGNOSIS — E78.00 PURE HYPERCHOLESTEROLEMIA: ICD-10-CM

## 2021-04-16 DIAGNOSIS — I10 ESSENTIAL HYPERTENSION: ICD-10-CM

## 2021-04-16 DIAGNOSIS — R07.89 CHEST TIGHTNESS: ICD-10-CM

## 2021-04-16 DIAGNOSIS — R00.2 PALPITATIONS: Primary | ICD-10-CM

## 2021-04-16 DIAGNOSIS — E78.5 DYSLIPIDEMIA: ICD-10-CM

## 2021-04-16 PROCEDURE — 99214 OFFICE O/P EST MOD 30 MIN: CPT | Performed by: INTERNAL MEDICINE

## 2021-04-16 NOTE — PROGRESS NOTES
Subjective:     Encounter Date:04/16/2021    Patient ID: Brian Pablo is a 75 y.o.  white female from [place], [place], retired speech pathologist.     REFERRING HEALTHCARE PROVIDER: SHIRAZ Wakefield  INTERNIST:  Gabby Kebede MD  NEUROSURGEON: Jw Hunter MD  DERMATOLOGIST: JESSY Mcgregor/ Reyes Farrar MD    Chief Complaint:   Chief Complaint   Patient presents with   • Palpitations       Problem List:  1. Palpitations:  a. Holter monitor, 11/01/2018: Demonstrated sinus rhythm, 8 single VEs, 135 single SVEs, 10 paired.  Symptoms consistent with PACs and PVCs which are less than 1%  b. Residual class I symptoms with acceptable EKG  c. Echocardiogram, 12/21/2018: EF 0.68. Mild-to-moderate TR. Left ventricular diastolic dysfunction is abnormal consistent with: age. No evidence of pericardial effusion. Mild pulmonary hypertension.   d. Residual class I symptoms, January 2019, June 2019  e. Intermittent CCS class II exertional dyspnea and fatigue with increased palpitation, April 2021.  2. Hyperlipidemia; ASCVD 10-year risk 14.7%, 10.8% if treated  3. Positional vertigo  4. Osteoarthritis  5. Surgical history:   a. Colonoscopy with polyp removal  b. C5-C6 fusion    Allergies   Allergen Reactions   • Sulfamethoxazole GI Intolerance       Current Outpatient Medications:   •  acebutolol (SECTRAL) 200 MG capsule, TAKE 1 CAPSULE BY MOUTH ONCE DAILY, Disp: 90 capsule, Rfl: 3  •  aspirin 81 MG EC tablet, Take 81 mg by mouth Daily., Disp: , Rfl:   •  calcium carbonate-vitamin d 600-400 MG-UNIT per tablet, Take  by mouth Daily. Take as Directed, Disp: , Rfl:   •  conjugated estrogens (PREMARIN) 0.625 MG/GM vaginal cream, Insert  into the vagina 1 (One) Time Per Week. As Directed once a week, Disp: 30 g, Rfl: 1  •  CRANBERRY SOFT PO, Take 4,200 mg by mouth Daily., Disp: , Rfl:   •  Multiple Vitamins-Minerals (MULTIVITAMIN ADULT PO), Take 1 tablet by mouth daily., Disp: , Rfl:   •  mupirocin  "(Bactroban) 2 % ointment, Apply  topically to the appropriate area as directed 3 (Three) Times a Day., Disp: 15 g, Rfl: 0    History of Present Illness: Patient returns for scheduled 7-month follow up. She has been feeling well overall from a cardiovascular standpoint. She notes she \"feels something isn't right\" for the past several months. She experiences tightness and heaviness in her sternal chest. However, she participates in aerobic exercise and yoga 30 minutes consecutively on a regular basis and does not have chest pain or pressure with this. She has checked her heart rate during the episodes of pain and it has been normal. Patient denies shortness of breath, edema, dizziness, and syncope. She has had no interim ER visits, hospitalizations, serious illnesses, or surgeries. She monitors her blood pressure at home and hand carries a log with her to the office for review. Her blood pressure ranges from 108-123/61-77 with majority of readings around 110/70. She has received COVID immunization.         ROS   Obtained and negative except as outlined in problem list and HPI.    Procedures       Objective:       Vitals:    04/16/21 0928 04/16/21 0929 04/16/21 0943   BP: 157/83 140/70 120/78   BP Location: Left arm Left arm Right arm   Patient Position: Sitting Standing Sitting   Pulse: 61 64    Temp: 96 °F (35.6 °C)     Weight: 60.8 kg (134 lb)     Height: 157.5 cm (62\")       Body mass index is 24.51 kg/m².  Last weight: 137 lbs    Vitals reviewed.   Constitutional:       Appearance: Well-developed.   Neck:      Thyroid: No thyromegaly.      Vascular: No carotid bruit or JVD.      Lymphadenopathy: No cervical adenopathy.   Pulmonary:      Effort: Pulmonary effort is normal.      Breath sounds: Normal breath sounds. No wheezing. No rhonchi. No rales.   Cardiovascular:      Regular rhythm.      Murmurs: There is a grade 1/6 mid frequency midsystolic murmur at the URSB.      No gallop. No S3 gallop.   Pulses:     " Dorsalis pedis: 2+ bilaterally.     Posterior tibial: 2+ bilaterally.  Edema:     Peripheral edema absent.   Abdominal:      Palpations: Abdomen is soft. There is no abdominal mass.      Tenderness: There is no abdominal tenderness. There is no guarding.   Musculoskeletal: Normal range of motion. Skin:     General: Skin is warm and dry.      Findings: No rash.   Neurological:      Mental Status: Alert and oriented to person, place, and time.           Lab Review:   Lab Results   Component Value Date    GLUCOSE 76 09/18/2020    BUN 12 09/18/2020    CREATININE 0.89 09/18/2020    EGFRIFNONA 62 09/18/2020    BCR 13.5 09/18/2020     09/18/2020    K 4.7 09/18/2020     09/18/2020    CO2 26.3 09/18/2020    CALCIUM 9.7 09/18/2020    ALBUMIN 4.20 09/01/2020    AST 23 09/01/2020    ALT 21 09/01/2020       Lab Results   Component Value Date    WBC 4.71 09/01/2020    HGB 13.0 09/01/2020    HCT 39.7 09/01/2020    MCV 92.3 09/01/2020     09/01/2020       Lab Results   Component Value Date    TSH 1.640 09/01/2020       Lab Results   Component Value Date    CHOL 194 09/01/2020    CHOL 187 08/28/2019    CHOL 190 08/17/2018     Lab Results   Component Value Date    TRIG 66 09/01/2020    TRIG 65 08/28/2019    TRIG 55 08/17/2018     Lab Results   Component Value Date    HDL 69 (H) 09/01/2020    HDL 69 (H) 08/28/2019    HDL 72 (H) 08/17/2018     Lab Results   Component Value Date     (H) 09/01/2020     (H) 08/28/2019     08/17/2018     · Mammogram, 11/16/2020:  No findings suspicious for malignancy. ACR BI-RADS CATEGORY:  1, NEGATIVE        Assessment:       Overall continued acceptable course with no new interim cardiopulmonary complaints with good functional status. We will defer additional diagnostic or therapeutic intervention from a cardiac perspective at this time other than to obtain GXT and place an event monitor to assess for possible progressive palpitations.     Diagnosis Plan   1.  Palpitations  Fairly asymptomatic with possibly associated chest tightness. Event monitor placed today and schedule GXT.   2. Essential hypertension  Acceptable control. Continue current treatment.   3. Dyslipidemia  Fair control. Continue heart healthy diet.          Plan:         1. Patient to continue current medications and close follow up with the above providers.  2. The following studies are ordered:  A. Event monitor placed.  B. Symptom limited Cardiolite GXT.   3. Tentative cardiology follow up in October 2021 or patient may return sooner PRN.    I, Kenny Jefferson, attest that this documentation has been prepared under the direction and in the presence of John Staton MD 04/16/2021    I, John Staton MD, [facility], personally performed the services described in this documentation as scribed by the above named individual in my presence, and it is both accurate and complete. At 10:05 EDT on 04/16/2021

## 2021-05-14 RX ORDER — ACEBUTOLOL HYDROCHLORIDE 200 MG/1
200 CAPSULE ORAL 2 TIMES DAILY
Qty: 180 CAPSULE | Refills: 3
Start: 2021-05-14 | End: 2021-05-21 | Stop reason: SDUPTHER

## 2021-05-14 NOTE — TELEPHONE ENCOUNTER
Called pt regarding holter results.     Per KTS- acebutolol 200 mg BID, call in 1-2 weeks to update symptoms and  cardiolite GXT.    Discussed KTS recommendations above. Pt verbalizes understanding and agreeable to plan.

## 2021-05-15 PROBLEM — I47.10 SVT (SUPRAVENTRICULAR TACHYCARDIA): Status: ACTIVE | Noted: 2018-11-14

## 2021-05-15 PROBLEM — I47.1 SVT (SUPRAVENTRICULAR TACHYCARDIA) (HCC): Status: ACTIVE | Noted: 2018-11-14

## 2021-05-20 RX ORDER — NITROGLYCERIN 0.4 MG/1
TABLET SUBLINGUAL
Qty: 100 TABLET | Refills: 11 | Status: SHIPPED | OUTPATIENT
Start: 2021-05-20

## 2021-05-20 RX ORDER — ISOSORBIDE MONONITRATE 30 MG/1
30 TABLET, EXTENDED RELEASE ORAL DAILY
Qty: 90 TABLET | Refills: 3 | Status: SHIPPED | OUTPATIENT
Start: 2021-05-20 | End: 2022-04-27

## 2021-05-20 NOTE — TELEPHONE ENCOUNTER
Called pt and gave KTS recommendations above. Educated pt on nitro use. Pt verbalizes understanding and agreeable to plan.      Messaged scheduling to move ST up.

## 2021-05-20 NOTE — TELEPHONE ENCOUNTER
Patient called and stated that she felt intermittent chest tightness in center of chest over the weekend after she increased acebutolol to 200 mg BID, up from once daily. Last night, 5/19/2021 patient reports pain in chest that last 1-1.5 hours. Pt states that some nights she has arm and jaw pain. Pt has stress test schedule 6/1/20. Movement and exercise seem to improve the pain.     Pt reports minimal palpitations.     Pt denies current chest tightness/pain, SOB, swelling, dizziness, nausea.    Pt currently taking:  Acebutolol 200 mg BID    Please advise.

## 2021-05-20 NOTE — TELEPHONE ENCOUNTER
Noted; she needs to initiate Imdur 30 mg daily and be allowed access to NTG spray as needed and if recurrent progressive disabling symptoms develop she needs to come to [facility] ED.  She needs to pursue her myocardial perfusion study as ordered and recommended last month.  Please attempt to expedite the scheduling and procedure.    Thanks!

## 2021-05-21 ENCOUNTER — APPOINTMENT (OUTPATIENT)
Dept: CARDIOLOGY | Facility: HOSPITAL | Age: 76
End: 2021-05-21

## 2021-05-21 RX ORDER — ACEBUTOLOL HYDROCHLORIDE 200 MG/1
200 CAPSULE ORAL 2 TIMES DAILY
Qty: 180 CAPSULE | Refills: 3 | Status: SHIPPED | OUTPATIENT
Start: 2021-05-21 | End: 2022-04-27

## 2021-05-27 ENCOUNTER — HOSPITAL ENCOUNTER (OUTPATIENT)
Dept: CARDIOLOGY | Facility: HOSPITAL | Age: 76
Discharge: HOME | End: 2021-05-27

## 2021-05-27 VITALS — WEIGHT: 126 LBS | HEIGHT: 62 IN | BODY MASS INDEX: 23.19 KG/M2

## 2021-05-27 DIAGNOSIS — R07.89 CHEST TIGHTNESS: ICD-10-CM

## 2021-05-27 DIAGNOSIS — I10 ESSENTIAL HYPERTENSION: ICD-10-CM

## 2021-05-27 DIAGNOSIS — E78.00 PURE HYPERCHOLESTEROLEMIA: ICD-10-CM

## 2021-05-27 DIAGNOSIS — R00.2 PALPITATIONS: ICD-10-CM

## 2021-05-27 LAB
BH CV NUCLEAR PRIOR STUDY: 3
BH CV REST NUCLEAR ISOTOPE DOSE: 9.3 MCI
BH CV STRESS BP STAGE 1: NORMAL
BH CV STRESS BP STAGE 2: NORMAL
BH CV STRESS BP STAGE 3: NORMAL
BH CV STRESS DURATION MIN STAGE 1: 3
BH CV STRESS DURATION MIN STAGE 2: 3
BH CV STRESS DURATION MIN STAGE 3: 3
BH CV STRESS DURATION SEC STAGE 1: 0
BH CV STRESS DURATION SEC STAGE 2: 0
BH CV STRESS DURATION SEC STAGE 3: 0
BH CV STRESS GRADE STAGE 1: 10
BH CV STRESS GRADE STAGE 2: 12
BH CV STRESS GRADE STAGE 3: 14
BH CV STRESS HR STAGE 1: 105
BH CV STRESS HR STAGE 2: 125
BH CV STRESS HR STAGE 3: 129
BH CV STRESS METS STAGE 1: 5
BH CV STRESS METS STAGE 2: 7.5
BH CV STRESS METS STAGE 3: 10.1
BH CV STRESS NUCLEAR ISOTOPE DOSE: 31.2 MCI
BH CV STRESS O2 STAGE 2: 99
BH CV STRESS O2 STAGE 3: 97
BH CV STRESS PROTOCOL 1: NORMAL
BH CV STRESS RECOVERY BP: NORMAL MMHG
BH CV STRESS RECOVERY HR: 72 BPM
BH CV STRESS RECOVERY O2: 99 %
BH CV STRESS SPEED STAGE 1: 1.7
BH CV STRESS SPEED STAGE 2: 2.5
BH CV STRESS SPEED STAGE 3: 3.4
BH CV STRESS STAGE 1: 1
BH CV STRESS STAGE 2: 2
BH CV STRESS STAGE 3: 3
LV EF NUC BP: 74 %
MAXIMAL PREDICTED HEART RATE: 144 BPM
PERCENT MAX PREDICTED HR: 90.97 %
STRESS BASELINE BP: NORMAL MMHG
STRESS BASELINE HR: 57 BPM
STRESS O2 SAT REST: 100 %
STRESS PERCENT HR: 107 %
STRESS POST ESTIMATED WORKLOAD: 10.1 METS
STRESS POST EXERCISE DUR MIN: 9 MIN
STRESS POST EXERCISE DUR SEC: 0 SEC
STRESS POST O2 SAT PEAK: 97 %
STRESS POST PEAK BP: NORMAL MMHG
STRESS POST PEAK HR: 131 BPM
STRESS TARGET HR: 122 BPM

## 2021-05-27 PROCEDURE — 93017 CV STRESS TEST TRACING ONLY: CPT

## 2021-05-27 PROCEDURE — 78452 HT MUSCLE IMAGE SPECT MULT: CPT

## 2021-05-27 PROCEDURE — 78452 HT MUSCLE IMAGE SPECT MULT: CPT | Performed by: INTERNAL MEDICINE

## 2021-05-27 PROCEDURE — 0 TECHNETIUM SESTAMIBI: Performed by: INTERNAL MEDICINE

## 2021-05-27 PROCEDURE — 93018 CV STRESS TEST I&R ONLY: CPT | Performed by: INTERNAL MEDICINE

## 2021-05-27 PROCEDURE — A9500 TC99M SESTAMIBI: HCPCS | Performed by: INTERNAL MEDICINE

## 2021-05-27 RX ADMIN — TECHNETIUM TC 99M SESTAMIBI 1 DOSE: 1 INJECTION INTRAVENOUS at 08:20

## 2021-05-27 RX ADMIN — TECHNETIUM TC 99M SESTAMIBI 1 DOSE: 1 INJECTION INTRAVENOUS at 10:00

## 2021-05-30 ENCOUNTER — APPOINTMENT (OUTPATIENT)
Dept: PREADMISSION TESTING | Facility: HOSPITAL | Age: 76
End: 2021-05-30

## 2021-06-01 ENCOUNTER — APPOINTMENT (OUTPATIENT)
Dept: CARDIOLOGY | Facility: HOSPITAL | Age: 76
End: 2021-06-01

## 2021-06-01 ENCOUNTER — HOSPITAL ENCOUNTER (OUTPATIENT)
Dept: CARDIOLOGY | Facility: HOSPITAL | Age: 76
End: 2021-06-01

## 2021-09-07 ENCOUNTER — TELEPHONE (OUTPATIENT)
Dept: INTERNAL MEDICINE | Facility: CLINIC | Age: 76
End: 2021-09-07

## 2021-09-07 ENCOUNTER — LAB (OUTPATIENT)
Dept: LAB | Facility: HOSPITAL | Age: 76
End: 2021-09-07

## 2021-09-07 DIAGNOSIS — Z00.00 MEDICARE ANNUAL WELLNESS VISIT, SUBSEQUENT: ICD-10-CM

## 2021-09-07 DIAGNOSIS — Z00.00 MEDICARE ANNUAL WELLNESS VISIT, SUBSEQUENT: Primary | ICD-10-CM

## 2021-09-07 LAB
ALBUMIN SERPL-MCNC: 4.7 G/DL (ref 3.5–5.2)
ALBUMIN/GLOB SERPL: 1.9 G/DL
ALP SERPL-CCNC: 53 U/L (ref 39–117)
ALT SERPL W P-5'-P-CCNC: 15 U/L (ref 1–33)
ANION GAP SERPL CALCULATED.3IONS-SCNC: 8.7 MMOL/L (ref 5–15)
AST SERPL-CCNC: 22 U/L (ref 1–32)
BACTERIA UR QL AUTO: NORMAL /HPF
BASOPHILS # BLD AUTO: 0.05 10*3/MM3 (ref 0–0.2)
BASOPHILS NFR BLD AUTO: 0.9 % (ref 0–1.5)
BILIRUB SERPL-MCNC: 0.6 MG/DL (ref 0–1.2)
BILIRUB UR QL STRIP: NEGATIVE
BUN SERPL-MCNC: 8 MG/DL (ref 8–23)
BUN/CREAT SERPL: 9.2 (ref 7–25)
CALCIUM SPEC-SCNC: 9.4 MG/DL (ref 8.6–10.5)
CHLORIDE SERPL-SCNC: 104 MMOL/L (ref 98–107)
CHOLEST SERPL-MCNC: 216 MG/DL (ref 0–200)
CLARITY UR: CLEAR
CO2 SERPL-SCNC: 26.3 MMOL/L (ref 22–29)
COLOR UR: YELLOW
CREAT SERPL-MCNC: 0.87 MG/DL (ref 0.57–1)
DEPRECATED RDW RBC AUTO: 44.6 FL (ref 37–54)
EOSINOPHIL # BLD AUTO: 0.14 10*3/MM3 (ref 0–0.4)
EOSINOPHIL NFR BLD AUTO: 2.7 % (ref 0.3–6.2)
ERYTHROCYTE [DISTWIDTH] IN BLOOD BY AUTOMATED COUNT: 13.1 % (ref 12.3–15.4)
GFR SERPL CREATININE-BSD FRML MDRD: 63 ML/MIN/1.73
GLOBULIN UR ELPH-MCNC: 2.5 GM/DL
GLUCOSE SERPL-MCNC: 91 MG/DL (ref 65–99)
GLUCOSE UR STRIP-MCNC: NEGATIVE MG/DL
HCT VFR BLD AUTO: 41.7 % (ref 34–46.6)
HDLC SERPL-MCNC: 82 MG/DL (ref 40–60)
HGB BLD-MCNC: 13.5 G/DL (ref 12–15.9)
HGB UR QL STRIP.AUTO: NEGATIVE
HYALINE CASTS UR QL AUTO: NORMAL /LPF
IMM GRANULOCYTES # BLD AUTO: 0.01 10*3/MM3 (ref 0–0.05)
IMM GRANULOCYTES NFR BLD AUTO: 0.2 % (ref 0–0.5)
KETONES UR QL STRIP: NEGATIVE
LDLC SERPL CALC-MCNC: 126 MG/DL (ref 0–100)
LDLC/HDLC SERPL: 1.52 {RATIO}
LEUKOCYTE ESTERASE UR QL STRIP.AUTO: NEGATIVE
LYMPHOCYTES # BLD AUTO: 1.56 10*3/MM3 (ref 0.7–3.1)
LYMPHOCYTES NFR BLD AUTO: 29.6 % (ref 19.6–45.3)
MCH RBC QN AUTO: 29.8 PG (ref 26.6–33)
MCHC RBC AUTO-ENTMCNC: 32.4 G/DL (ref 31.5–35.7)
MCV RBC AUTO: 92.1 FL (ref 79–97)
MONOCYTES # BLD AUTO: 0.5 10*3/MM3 (ref 0.1–0.9)
MONOCYTES NFR BLD AUTO: 9.5 % (ref 5–12)
NEUTROPHILS NFR BLD AUTO: 3.01 10*3/MM3 (ref 1.7–7)
NEUTROPHILS NFR BLD AUTO: 57.1 % (ref 42.7–76)
NITRITE UR QL STRIP: NEGATIVE
NRBC BLD AUTO-RTO: 0 /100 WBC (ref 0–0.2)
PH UR STRIP.AUTO: 7.5 [PH] (ref 5–8)
PLATELET # BLD AUTO: 245 10*3/MM3 (ref 140–450)
PMV BLD AUTO: 9.4 FL (ref 6–12)
POTASSIUM SERPL-SCNC: 4.7 MMOL/L (ref 3.5–5.2)
PROT SERPL-MCNC: 7.2 G/DL (ref 6–8.5)
PROT UR QL STRIP: NEGATIVE
RBC # BLD AUTO: 4.53 10*6/MM3 (ref 3.77–5.28)
RBC # UR: NORMAL /HPF
REF LAB TEST METHOD: NORMAL
SODIUM SERPL-SCNC: 139 MMOL/L (ref 136–145)
SP GR UR STRIP: 1.01 (ref 1–1.03)
SQUAMOUS #/AREA URNS HPF: NORMAL /HPF
TRIGL SERPL-MCNC: 45 MG/DL (ref 0–150)
TSH SERPL DL<=0.05 MIU/L-ACNC: 1.72 UIU/ML (ref 0.27–4.2)
UROBILINOGEN UR QL STRIP: NORMAL
VLDLC SERPL-MCNC: 8 MG/DL (ref 5–40)
WBC # BLD AUTO: 5.27 10*3/MM3 (ref 3.4–10.8)
WBC UR QL AUTO: NORMAL /HPF

## 2021-09-07 PROCEDURE — 85025 COMPLETE CBC W/AUTO DIFF WBC: CPT

## 2021-09-07 PROCEDURE — 81001 URINALYSIS AUTO W/SCOPE: CPT

## 2021-09-07 PROCEDURE — 80053 COMPREHEN METABOLIC PANEL: CPT

## 2021-09-07 PROCEDURE — 80061 LIPID PANEL: CPT

## 2021-09-07 PROCEDURE — 84443 ASSAY THYROID STIM HORMONE: CPT

## 2021-09-13 PROBLEM — I10 ESSENTIAL HYPERTENSION: Chronic | Status: ACTIVE | Noted: 2019-01-25

## 2021-09-13 PROBLEM — K63.5 HYPERPLASTIC COLON POLYP: Chronic | Status: ACTIVE | Noted: 2017-12-07

## 2021-09-13 PROBLEM — I47.1 SVT (SUPRAVENTRICULAR TACHYCARDIA) (HCC): Chronic | Status: ACTIVE | Noted: 2018-11-14

## 2021-09-13 PROBLEM — L82.0 BENIGN LICHENOID KERATOSIS: Chronic | Status: ACTIVE | Noted: 2020-10-02

## 2021-09-13 PROBLEM — L57.0 BENIGN LICHENOID KERATOSIS: Chronic | Status: ACTIVE | Noted: 2020-10-02

## 2021-09-13 PROBLEM — L57.0 ACTINIC KERATOSES: Chronic | Status: ACTIVE | Noted: 2020-01-13

## 2021-09-13 PROBLEM — R01.1 HEART MURMUR: Chronic | Status: ACTIVE | Noted: 2019-06-27

## 2021-09-13 PROBLEM — L82.1 SEBORRHEIC KERATOSES: Chronic | Status: ACTIVE | Noted: 2020-01-13

## 2021-09-13 PROBLEM — I47.10 SVT (SUPRAVENTRICULAR TACHYCARDIA): Chronic | Status: ACTIVE | Noted: 2018-11-14

## 2021-09-13 NOTE — PROGRESS NOTES
ANNUAL WELLNESS VISIT    DRUG AND ALCOHOL USE      no alcohol use, no tobacco use and no caffeine use    DIET AND PHYSICAL ACTIVITY     Diet: general    Exercise: daily   Exercise Details: walking, yoga    MOOD DISORDER AND COGNITIVE SCREENING   Depression Screening Tool Used yes - see PHQ-9; components reviewed with patient; Denies feeling blue, hopeless, depressed or not having pleasure doing things. Reports no concerns with sleep and no concerns with energy. Reports no concerns with appetite. Denies slow thoughts, slow movements, issues with concentration, or negative thoughts. Screening is NEG for active depression.      Anxiety Screening Tool Used yes     Mini-Cog Performed   Yes    1. Tell Patient 3 Words car tie pen    2. Administer Clock Test normal    3. Recall 3 words  car tie pen    4. Number Correct Items 3    FUNCTIONAL ABILITY AND LEVEL OF SAFETY   Hearing no hearing loss     Wears Hearing Aids No       Current Activities Independent      none  - see Funct/Cog Status Intake     Fall Risk Assessment       Has difficulty with walking or balance  No         Timed Up and Go (TUG) Test  8 sec.       If >12 sec, normal    ADVANCED DIRECTIVE  Advance Care Planning   ACP discussion was held with the patient during this visit. Patient has an advance directive (not in EMR), copy requested.     Advance Care Planning Discussion:  16 min or more spent on counseling; patient has advanced directive and living will.  Reviewed desires for end of life care, which is to have comfort care.  Patient does not want extraordinary life-sustaining measures, including no chest compression, shocks, intubation/ventilator use, feeding tube, or prolonged artificial life support.  Reviewed code status options, meanings, desires. Patient 's code status is DNR.   Encouraged patient to ensure family is aware of desires/preferences.    PAIN SCREENING Do you have pain right now? no      If so, 1-10 scale: 0     Intermittent     Do you  "have pain every day? No      Probable chronic pain: No     Recent Hospitalizations:  No recent hospitalization(s)..     MEDICATION REVIEW   - updated and reviewed (see Medication List).   - reviewed for potentially harmful drug-disease interactions in the elderly.   - reviewed for high risk medications in the elderly.   - aspirin use: No    BMI  Body mass index is 24.56 kg/m².    Patient's Body mass index is 24.56 kg/m². indicating that she is within normal range (BMI 18.5-24.9). No BMI management plan needed..    _________________________________________________________    Chief Complaint   Patient presents with   • Medicare Wellness-subsequent   • Hyperlipidemia   • Hypertension       History of Present Illness  75 y.o.  woman presents for updated phys examination and wellness visit. Complains of left shoulder pain that is better with exercise; still wants to consider PT for therapeutic exercise plan but for now, states it is doing ok. No assoc'd numbness/tingling and not dropping things.    Review of Systems  Denies headaches, visual changes, CP, palpitations, SOB, cough, abd pain, n/v/d, difficulty with urination, numbness/tingling, falls, mood changes, lightheadedness, hearing changes, rashes.    ROS (+) for decreased left shoulder pain.    Denies vaginal discharge or bleeding (no periods) or breast concerns.   All other ROS reviewed and negative.    Current Outpatient Medications:   •  acebutolol (SECTRAL) 200 MG BID  •  aspirin 81 MG QD  •  calcium carbonate-vitamin d 600-400 MG-UNIT QD  •  conjugated estrogens (PREMARIN) 0.625 MG/GM vaginal cream weekly  •  CRANBERRY SOFT PO, Take 4,200 mg QD  •  isosorbide mononitrate (IMDUR) 30 MG  QD  •  Multiple Vitamins-Minerals QD  •  mupirocin (Bactroban) 2 % ointment, AD  •  nitroglycerin (NITROSTAT) 0.4 MG SL prn    VITALS:  /72   Pulse 60   Ht 154.9 cm (61\")   Wt 59 kg (130 lb)   SpO2 99%   BMI 24.56 kg/m²     Physical Exam  Vitals and nursing " note reviewed.   Constitutional:       General: She is not in acute distress.     Appearance: Normal appearance. She is well-developed. She is not ill-appearing.   HENT:      Head: Normocephalic.      Right Ear: Ear canal and external ear normal.      Left Ear: Ear canal and external ear normal.      Nose: Nose normal.   Eyes:      Extraocular Movements: Extraocular movements intact.      Conjunctiva/sclera: Conjunctivae normal.      Pupils: Pupils are equal, round, and reactive to light.      Comments: Wears glasses   Neck:      Vascular: No carotid bruit (bilaterally).   Cardiovascular:      Rate and Rhythm: Normal rate and regular rhythm.      Pulses: Normal pulses.      Heart sounds: Normal heart sounds.      Comments: Spider veins BLE; 2+ PT pulses bilaterally  Pulmonary:      Effort: Pulmonary effort is normal. No respiratory distress.      Breath sounds: Normal breath sounds. No wheezing or rales.   Abdominal:      General: Bowel sounds are normal. There is no distension.      Palpations: Abdomen is soft. There is no mass.      Tenderness: There is no abdominal tenderness.   Genitourinary:     Comments: Chaperone declined by patient.    Breast exam unremarkable without masses, skin changes, nipple discharge, or axillary adenopathy.    Musculoskeletal:      Cervical back: Normal range of motion and neck supple.      Right lower leg: No edema.      Left lower leg: No edema.      Comments: bilat shoulders FROM   Lymphadenopathy:      Cervical: No cervical adenopathy.   Skin:     General: Skin is warm and dry.      Findings: No rash.   Neurological:      Mental Status: She is alert and oriented to person, place, and time.      Cranial Nerves: No cranial nerve deficit.      Gait: Gait normal.      Deep Tendon Reflexes: Reflexes normal.   Psychiatric:         Mood and Affect: Mood normal.         Behavior: Behavior normal.         LABS  Results for orders placed or performed in visit on 09/07/21   Comprehensive  Metabolic Panel    Specimen: Blood   Result Value Ref Range    Glucose 91 65 - 99 mg/dL    BUN 8 8 - 23 mg/dL    Creatinine 0.87 0.57 - 1.00 mg/dL    Sodium 139 136 - 145 mmol/L    Potassium 4.7 3.5 - 5.2 mmol/L    Chloride 104 98 - 107 mmol/L    CO2 26.3 22.0 - 29.0 mmol/L    Calcium 9.4 8.6 - 10.5 mg/dL    Total Protein 7.2 6.0 - 8.5 g/dL    Albumin 4.70 3.50 - 5.20 g/dL    ALT (SGPT) 15 1 - 33 U/L    AST (SGOT) 22 1 - 32 U/L    Alkaline Phosphatase 53 39 - 117 U/L    Total Bilirubin 0.6 0.0 - 1.2 mg/dL    eGFR Non African Amer 63 >60 mL/min/1.73    Globulin 2.5 gm/dL    A/G Ratio 1.9 g/dL    BUN/Creatinine Ratio 9.2 7.0 - 25.0    Anion Gap 8.7 5.0 - 15.0 mmol/L   Lipid Panel    Specimen: Blood   Result Value Ref Range    Total Cholesterol 216 (H) 0 - 200 mg/dL    Triglycerides 45 0 - 150 mg/dL    HDL Cholesterol 82 (H) 40 - 60 mg/dL    LDL Cholesterol  126 (H) 0 - 100 mg/dL    VLDL Cholesterol 8 5 - 40 mg/dL    LDL/HDL Ratio 1.52    TSH    Specimen: Blood   Result Value Ref Range    TSH 1.720 0.270 - 4.200 uIU/mL   CBC Auto Differential    Specimen: Blood   Result Value Ref Range    WBC 5.27 3.40 - 10.80 10*3/mm3    RBC 4.53 3.77 - 5.28 10*6/mm3    Hemoglobin 13.5 12.0 - 15.9 g/dL    Hematocrit 41.7 34.0 - 46.6 %    MCV 92.1 79.0 - 97.0 fL    MCH 29.8 26.6 - 33.0 pg    MCHC 32.4 31.5 - 35.7 g/dL    RDW 13.1 12.3 - 15.4 %    RDW-SD 44.6 37.0 - 54.0 fl    MPV 9.4 6.0 - 12.0 fL    Platelets 245 140 - 450 10*3/mm3    Neutrophil % 57.1 42.7 - 76.0 %    Lymphocyte % 29.6 19.6 - 45.3 %    Monocyte % 9.5 5.0 - 12.0 %    Eosinophil % 2.7 0.3 - 6.2 %    Basophil % 0.9 0.0 - 1.5 %    Immature Grans % 0.2 0.0 - 0.5 %    Neutrophils, Absolute 3.01 1.70 - 7.00 10*3/mm3    Lymphocytes, Absolute 1.56 0.70 - 3.10 10*3/mm3    Monocytes, Absolute 0.50 0.10 - 0.90 10*3/mm3    Eosinophils, Absolute 0.14 0.00 - 0.40 10*3/mm3    Basophils, Absolute 0.05 0.00 - 0.20 10*3/mm3    Immature Grans, Absolute 0.01 0.00 - 0.05 10*3/mm3    nRBC  0.0 0.0 - 0.2 /100 WBC   Urinalysis without microscopic (no culture) - Urine, Clean Catch    Specimen: Urine, Clean Catch   Result Value Ref Range    Color, UA Yellow Yellow, Straw    Appearance, UA Clear Clear    pH, UA 7.5 5.0 - 8.0    Specific Gravity, UA 1.010 1.005 - 1.030    Glucose, UA Negative Negative    Ketones, UA Negative Negative    Bilirubin, UA Negative Negative    Blood, UA Negative Negative    Protein, UA Negative Negative    Leuk Esterase, UA Negative Negative    Nitrite, UA Negative Negative    Urobilinogen, UA 0.2 E.U./dL 0.2 - 1.0 E.U./dL   Urinalysis, Microscopic Only - Urine, Clean Catch    Specimen: Urine, Clean Catch   Result Value Ref Range    RBC, UA 0-2 None Seen, 0-2 /HPF    WBC, UA 0-2 None Seen, 0-2 /HPF    Bacteria, UA None Seen None Seen /HPF    Squamous Epithelial Cells, UA 0-2 None Seen, 0-2 /HPF    Hyaline Casts, UA None Seen None Seen /LPF    Methodology Automated Microscopy      5/27/21 nl cardiolite stress test    ASSESSMENT/PLAN    Diagnoses and all orders for this visit:    1. Medicare annual wellness visit, subsequent (Primary)  Assessment & Plan:  Health maintenance - COVID19 vacc done, counseling given re: 3rd dose recs (Pfizer 10/21); high-dose flu vacc recommended (none available in office today); Prevnar 7/15, PVX 5/11, Tdap 9/19 (next Td due 2029); Zostavax 2010; HAV and Shingrix done; mammo 11/20/20; no further Paps unless abnlities; DEXA ordered (Last 7/18); colonosc 12/17, repeat 2022 per Dr. Link; eye exam w/ Dr. Keenan 1/21; dental exam q6 mos; (+) seat belt use    Consultants:  Patient Care Team:  Gabby Kebede MD as PCP - General  Gabby Kebede MD as PCP - Internal Medicine  Nikolay, Casey Malik MD as Consulting Physician (Gastroenterology)  Anuel Diaz MD as Consulting Physician (Orthopedic Surgery)  Matthew Vanegas MD as Consulting Physician (Orthopedic Surgery)  FLOR Ying Jr., MD as Consulting Physician  (Ophthalmology)  Melia Keenan MD as Consulting Physician (Ophthalmology)  Raghavendra Coburn MD as Consulting Physician (Otolaryngology)  John Staton MD as Consulting Physician (Cardiology)  Nestor Mcdaniels MD as Consulting Physician (Pediatric Allergy and Immunology)  Delfina Gill APRN as Nurse Practitioner (Dermatology)  Reyes Farrar MD as Consulting Physician (Dermatology)          2. Essential hypertension  Assessment & Plan:  BP stable 124/72; no meds except acebutolol 200mg BID per cards      3. Hyperlipidemia  Assessment & Plan:  Lipids borderline with ; note nl cardiolite stress test 5/21; no meds; decrease saturated fats and cholesterol in the diet; repeat lipids in 12 mos        4. Menopause  -     DEXA Bone Density Axial; Future      FOLLOW-UP  RTC 1yr for annual wellness; fasting labs the week prior to appt (CBC, CMP, TSH, lipids, UA/micro)      Electronically signed by:    Gabby Kebede MD, FACP  09/14/2021

## 2021-09-13 NOTE — ASSESSMENT & PLAN NOTE
Lipids borderline with ; note nl cardiolite stress test 5/21; no meds; decrease saturated fats and cholesterol in the diet; repeat lipids in 12 mos

## 2021-09-13 NOTE — ASSESSMENT & PLAN NOTE
Health maintenance - COVID19 vacc done, counseling given re: 3rd dose recs (Pfizer 10/21); high-dose flu vacc recommended (none available in office today); Prevnar 7/15, PVX 5/11, Tdap 9/19 (next Td due 2029); Zostavax 2010; HAV and Shingrix done; mammo 11/20/20; no further Paps unless abnlities; DEXA ordered (Last 7/18); colonosc 12/17, repeat 2022 per Dr. Link; eye exam w/ Dr. Keenan 1/21; dental exam q6 mos; (+) seat belt use    Consultants:  Patient Care Team:  Gabby Kebede MD as PCP - General  Gabby Kebede MD as PCP - Internal Medicine  Casey Link MD as Consulting Physician (Gastroenterology)  Anuel Diaz MD as Consulting Physician (Orthopedic Surgery)  Matthew Vanegas MD as Consulting Physician (Orthopedic Surgery)  FLOR Ying Jr., MD as Consulting Physician (Ophthalmology)  Melia Keenan MD as Consulting Physician (Ophthalmology)  Raghavendra Coburn MD as Consulting Physician (Otolaryngology)  John Staton MD as Consulting Physician (Cardiology)  Nestor Mcdaniels MD as Consulting Physician (Pediatric Allergy and Immunology)  Delfina Gill APRN as Nurse Practitioner (Dermatology)  Reyes Farrar MD as Consulting Physician (Dermatology)

## 2021-09-14 ENCOUNTER — OFFICE VISIT (OUTPATIENT)
Dept: INTERNAL MEDICINE | Facility: CLINIC | Age: 76
End: 2021-09-14

## 2021-09-14 ENCOUNTER — PATIENT MESSAGE (OUTPATIENT)
Dept: INTERNAL MEDICINE | Facility: CLINIC | Age: 76
End: 2021-09-14

## 2021-09-14 ENCOUNTER — TRANSCRIBE ORDERS (OUTPATIENT)
Dept: ADMINISTRATIVE | Facility: HOSPITAL | Age: 76
End: 2021-09-14

## 2021-09-14 VITALS
HEIGHT: 61 IN | SYSTOLIC BLOOD PRESSURE: 124 MMHG | WEIGHT: 130 LBS | OXYGEN SATURATION: 99 % | HEART RATE: 60 BPM | DIASTOLIC BLOOD PRESSURE: 72 MMHG | BODY MASS INDEX: 24.55 KG/M2

## 2021-09-14 DIAGNOSIS — Z00.00 MEDICARE ANNUAL WELLNESS VISIT, SUBSEQUENT: Primary | ICD-10-CM

## 2021-09-14 DIAGNOSIS — E78.00 PURE HYPERCHOLESTEROLEMIA: Chronic | ICD-10-CM

## 2021-09-14 DIAGNOSIS — I10 ESSENTIAL HYPERTENSION: Chronic | ICD-10-CM

## 2021-09-14 DIAGNOSIS — Z78.0 MENOPAUSE: ICD-10-CM

## 2021-09-14 DIAGNOSIS — Z12.31 VISIT FOR SCREENING MAMMOGRAM: Primary | ICD-10-CM

## 2021-09-14 DIAGNOSIS — Z78.0 MENOPAUSE: Chronic | ICD-10-CM

## 2021-09-14 PROCEDURE — 1160F RVW MEDS BY RX/DR IN RCRD: CPT | Performed by: INTERNAL MEDICINE

## 2021-09-14 PROCEDURE — 99497 ADVNCD CARE PLAN 30 MIN: CPT | Performed by: INTERNAL MEDICINE

## 2021-09-14 PROCEDURE — 1170F FXNL STATUS ASSESSED: CPT | Performed by: INTERNAL MEDICINE

## 2021-09-14 PROCEDURE — 99397 PER PM REEVAL EST PAT 65+ YR: CPT | Performed by: INTERNAL MEDICINE

## 2021-09-14 PROCEDURE — 1126F AMNT PAIN NOTED NONE PRSNT: CPT | Performed by: INTERNAL MEDICINE

## 2021-09-14 PROCEDURE — G0439 PPPS, SUBSEQ VISIT: HCPCS | Performed by: INTERNAL MEDICINE

## 2021-09-14 NOTE — TELEPHONE ENCOUNTER
From: Brian Pablo  To: Gabby Kebede MD  Sent: 2021 2:53 PM EDT  Subject: Prescription Question    Hi Dr. Kebede,  It was great to see you today. I did get my High Dose Flu Vaccine at [facility] after I saw you today, so please add that to my chart for 21.  Also, my current prescription for Premarin 0.625 Vaginal Cream (#[id number]) has  and I should have asked you for a new prescription. Could you please send that  to [facility] at your convenience?  Thank you so much for taking care of me. I appreciate you.  Brian Pablo

## 2021-10-29 ENCOUNTER — OFFICE VISIT (OUTPATIENT)
Dept: CARDIOLOGY | Facility: CLINIC | Age: 76
End: 2021-10-29

## 2021-10-29 VITALS
DIASTOLIC BLOOD PRESSURE: 76 MMHG | BODY MASS INDEX: 25.11 KG/M2 | WEIGHT: 133 LBS | OXYGEN SATURATION: 98 % | HEART RATE: 74 BPM | SYSTOLIC BLOOD PRESSURE: 134 MMHG | HEIGHT: 61 IN

## 2021-10-29 DIAGNOSIS — E78.5 DYSLIPIDEMIA: ICD-10-CM

## 2021-10-29 DIAGNOSIS — R00.2 PALPITATIONS: Primary | ICD-10-CM

## 2021-10-29 DIAGNOSIS — I10 ESSENTIAL HYPERTENSION: ICD-10-CM

## 2021-10-29 PROCEDURE — 99214 OFFICE O/P EST MOD 30 MIN: CPT | Performed by: INTERNAL MEDICINE

## 2021-10-29 NOTE — PROGRESS NOTES
Subjective:     Encounter Date:10/29/2021    Patient ID: Brian Pablo is a 76 y.o.  white female from [place], [place], retired speech pathologist.     REFERRING HEALTHCARE PROVIDER: SHIRAZ Wakefield  INTERNIST:  Gabby Kebede MD  NEUROSURGEON: Jw Hunter MD  DERMATOLOGIST: JESSY Mcgregor/Reyes Farrar MD    Chief Complaint:   Chief Complaint   Patient presents with   • Palpitations   • Hypertension   • Hyperlipidemia   • Heart Murmur   • Rapid Heart Rate       Problem List:  1. Palpitations:  a. Holter monitor, 11/01/2018: Demonstrated sinus rhythm, 8 single VEs, 135 single SVEs, 10 paired.  Symptoms consistent with PACs and PVCs which are less than 1%  b. Residual class I symptoms with acceptable EKG  c. Echocardiogram, 12/21/2018: EF 0.68. Mild-to-moderate TR. Left ventricular diastolic dysfunction is abnormal consistent with: age. No evidence of pericardial effusion. Mild pulmonary hypertension.   d. Residual class I symptoms, January 2019, June 2019  e. Intermittent CCS class II exertional dyspnea and fatigue with increased palpitation, April 2021.  f. Acceptable negative quantitative SPECT gated Cardiolite exercise stress test without anginal type chest discomfort, ischemic electrocardiographic changes, or myocardial perfusion abnormalities suggestive of low probability for significant focal obstructive coronary artery disease with preserved systolic left ventricular function (LVEF 0.74) following exercise to 91% predicted maximum heart rate and 158% predicted exercise capacity, May 2021  g. Event monitor in April/May 2021: Occasional PACs, 18 episodes of SVT, occasional PVCs, no ventricular tachycardia, no AV block  h. Residual class I symptoms October 2021  2. Hyperlipidemia; ASCVD 10-year risk 14.7%, 10.8% if treated  3. Positional vertigo  4. Osteoarthritis  5. Surgical history:   a. Colonoscopy with polyp removal  b. C5-C6 fusion    Allergies   Allergen Reactions   •  Sulfamethoxazole GI Intolerance       Current Outpatient Medications:   •  acebutolol (SECTRAL) 200 MG capsule, Take 1 capsule by mouth 2 (Two) Times a Day., Disp: 180 capsule, Rfl: 3  •  calcium carbonate-vitamin d 600-400 MG-UNIT per tablet, Take  by mouth Daily. Take as Directed, Disp: , Rfl:   •  conjugated estrogens (PREMARIN) 0.625 MG/GM vaginal cream, Insert  into the vagina 1 (One) Time Per Week. As Directed once a week, Disp: 30 g, Rfl: 1  •  CRANBERRY SOFT PO, Take 4,200 mg by mouth Daily., Disp: , Rfl:   •  isosorbide mononitrate (IMDUR) 30 MG 24 hr tablet, Take 1 tablet by mouth Daily., Disp: 90 tablet, Rfl: 3  •  Multiple Vitamins-Minerals (MULTIVITAMIN ADULT PO), Take 1 tablet by mouth daily., Disp: , Rfl:   •  nitroglycerin (NITROSTAT) 0.4 MG SL tablet, 1 under the tongue as needed for angina, may repeat q5mins for up three doses, Disp: 100 tablet, Rfl: 11    History of Present Illness: Patient returns for scheduled 6-month follow up. She had an acceptable stress test May 2021.  She denies any recurrent chest pain since starting isosorbide.  She also denies any shortness of breath, palpitations, dizziness, presyncope, or syncope.  She does yoga twice a week and aerobics twice a week and denies any cardiopulmonary complaints when she is doing her activities.  She had laboratory testing with her physician September 2021 and was told that it was acceptable except for her cholesterol levels.  She adheres to a very heart healthy diet.  She has had her COVID vaccinations as well as her influenza vaccination.  The patient had an event monitor in April/May 2021 demonstrating occasional PACs, 18 episodes of SVT, occasional PVCs, no ventricular tachycardia, no AV block.      Review of Systems   All other systems reviewed and are negative.     Obtained and negative except as outlined in problem list and HPI.    Procedures       Objective:       Vitals:    10/29/21 1325 10/29/21 1331   BP: 134/80 134/76   BP  "Location: Right arm Right arm   Patient Position: Sitting Standing   Pulse: 68 74   SpO2: 98%    Weight: 60.3 kg (133 lb)    Height: 154.9 cm (60.98\")    Recheck blood pressure right arm sitting was 120/56  Body mass index is 25.14 kg/m².  Last weight: 134 lbs    Vitals reviewed.   Constitutional:       Appearance: Well-developed.   Neck:      Thyroid: No thyromegaly.      Vascular: No carotid bruit or JVD.      Lymphadenopathy: No cervical adenopathy.   Pulmonary:      Effort: Pulmonary effort is normal.      Breath sounds: Normal breath sounds. No wheezing. No rhonchi. No rales.   Cardiovascular:      Regular rhythm.      Murmurs: There is a grade 1/6 systolic murmur at the URSB.      No gallop. No S3 gallop.   Pulses:     Dorsalis pedis: 2+ bilaterally.     Posterior tibial: 2+ bilaterally.  Edema:     Peripheral edema absent.   Abdominal:      Palpations: Abdomen is soft. There is no abdominal mass.      Tenderness: There is no abdominal tenderness.   Musculoskeletal: Normal range of motion. Skin:     General: Skin is warm and dry.      Findings: No rash.   Neurological:      Mental Status: Alert and oriented to person, place, and time.           Lab Review:   Lab Results   Component Value Date    GLUCOSE 91 09/07/2021    BUN 8 09/07/2021    CREATININE 0.87 09/07/2021    EGFRIFNONA 63 09/07/2021    BCR 9.2 09/07/2021     09/07/2021    K 4.7 09/07/2021     09/07/2021    CO2 26.3 09/07/2021    CALCIUM 9.4 09/07/2021    ALBUMIN 4.70 09/07/2021    AST 22 09/07/2021    ALT 15 09/07/2021       Lab Results   Component Value Date    WBC 5.27 09/07/2021    HGB 13.5 09/07/2021    HCT 41.7 09/07/2021    MCV 92.1 09/07/2021     09/07/2021       Lab Results   Component Value Date    TSH 1.720 09/07/2021       Lab Results   Component Value Date    CHOL 216 (H) 09/07/2021    CHOL 194 09/01/2020    CHOL 187 08/28/2019     Lab Results   Component Value Date    TRIG 45 09/07/2021    TRIG 66 09/01/2020    TRIG " 65 08/28/2019     Lab Results   Component Value Date    HDL 82 (H) 09/07/2021    HDL 69 (H) 09/01/2020    HDL 69 (H) 08/28/2019     Lab Results   Component Value Date     (H) 09/07/2021     (H) 09/01/2020     (H) 08/28/2019     Event Monitor, 4/30/2021 (reviewed with patient by letter - alter acebutolol to 200 mg BID):  Predominantly sinus rhythm ranging from /minute with an average of 65/minute. 18 episodes of SVT which were asymptomatic. No ventricular tachycardia, pauses, AV block, or atrial fibrillation/flutter. Occasional PACs and rare PVCs.    Stress Test, 5/27/2021 (reviewed with patient by letter - continue current treatment):  · Patient stated no chest pain at baseline and during exercise; normal baseline electrocardiogram.  · Expected exercise time = 5:10 minutes; actual exercise time = 9:00 minutes; THR achieved at 5:14 minutes; BEATRIZ -58.  · No significant ST or T wave changes noted.  · Left ventricular ejection fraction is hyperdynamic (Calculated EF > 70%). .  · Diaphragmatic attenuation and GI artifacts are present.  · Raw images reviewed with the following abnormalities noted: mild movement at rest and post stress.  · Myocardial perfusion imaging indicates a normal myocardial perfusion study with no evidence of ischemia.  · Impressions are consistent with a low risk study; qualitative review of the thoracic CT scan slices demonstrate mild epicardial coronary artery calcification.  · There is no prior study available for comparison.  · Low risk for significant ischemic heart disease.  · Findings consistent with a normal ECG stress test.  · WNL room air oximetry before, during, and after exercise (%).  · Acceptable negative quantitative SPECT gated Cardiolite exercise stress test without anginal type chest discomfort, ischemic electrocardiographic changes, or myocardial perfusion abnormalities suggestive of low probability for significant focal obstructive coronary artery  disease with preserved systolic left ventricular function (LVEF 0.74) following exercise to 91% predicted maximum heart rate and 158% predicted exercise capacity.        Assessment:       Overall continued acceptable course with no new interim cardiopulmonary complaints with good functional status. We will defer additional diagnostic or therapeutic intervention from a cardiac perspective at this time.  The patient had an acceptable stress test May 2021. Event monitor in April/May 2021: Occasional PACs, 18 episodes of SVT, occasional PVCs, no ventricular tachycardia, no AV block     Diagnosis Plan   1. Palpitations  No persistent palpitations, Event monitor in April/May 2021: Occasional PACs, 18 episodes of SVT, occasional PVCs, no ventricular tachycardia, no AV block   2. Essential hypertension  Controlled, continue current cardiac medications   3. Dyslipidemia  Abnormal lipid panel September 2021          Plan:         1. Patient to continue current medications and close follow up with the above providers.  2. Tentative cardiology follow up in May 2022 or patient may return sooner PRN.    Scribed for John Staton MD by Marcy Grande, APRN 10/29/2021  13:45 EDT    I, John Staton MD, [facility], personally performed the services described in this documentation as scribed by the above named individual in my presence, and it is both accurate and complete. At 14:23 EDT on 10/29/2021

## 2021-12-08 ENCOUNTER — HOSPITAL ENCOUNTER (OUTPATIENT)
Dept: BONE DENSITY | Facility: HOSPITAL | Age: 76
Discharge: HOME | End: 2021-12-08

## 2021-12-08 ENCOUNTER — HOSPITAL ENCOUNTER (OUTPATIENT)
Dept: MAMMOGRAPHY | Facility: HOSPITAL | Age: 76
Discharge: HOME | End: 2021-12-08

## 2021-12-08 DIAGNOSIS — Z12.31 VISIT FOR SCREENING MAMMOGRAM: ICD-10-CM

## 2021-12-08 DIAGNOSIS — Z78.0 MENOPAUSE: Chronic | ICD-10-CM

## 2021-12-08 PROCEDURE — 77067 SCR MAMMO BI INCL CAD: CPT | Performed by: RADIOLOGY

## 2021-12-08 PROCEDURE — 77063 BREAST TOMOSYNTHESIS BI: CPT

## 2021-12-08 PROCEDURE — 77063 BREAST TOMOSYNTHESIS BI: CPT | Performed by: RADIOLOGY

## 2021-12-08 PROCEDURE — 77067 SCR MAMMO BI INCL CAD: CPT

## 2021-12-08 PROCEDURE — 77080 DXA BONE DENSITY AXIAL: CPT

## 2022-02-09 ENCOUNTER — PATIENT MESSAGE (OUTPATIENT)
Dept: INTERNAL MEDICINE | Facility: CLINIC | Age: 77
End: 2022-02-09

## 2022-02-09 DIAGNOSIS — M25.512 CHRONIC LEFT SHOULDER PAIN: Primary | ICD-10-CM

## 2022-02-09 DIAGNOSIS — G89.29 CHRONIC LEFT SHOULDER PAIN: Primary | ICD-10-CM

## 2022-02-10 NOTE — TELEPHONE ENCOUNTER
From: Brian Pablo  To: Gabby Kebede MD  Sent: 2/9/2022 11:02 AM EST  Subject: Request for PT    Hi Dr. Kebede,  At my September Wellness visit I mentioned that I have left upper arm pain which is not debilitating but still present. I would like to pursue Physical Therapy to help me manage range of movement. types of exercise to help improve it, etc. Please advise on a referral.  Thanks so much. I hope you and your family are doing well.  Brian Pablo

## 2022-02-24 ENCOUNTER — TELEPHONE (OUTPATIENT)
Dept: PHYSICAL THERAPY | Facility: CLINIC | Age: 77
End: 2022-02-24

## 2022-03-16 ENCOUNTER — TREATMENT (OUTPATIENT)
Dept: PHYSICAL THERAPY | Facility: CLINIC | Age: 77
End: 2022-03-16

## 2022-03-16 DIAGNOSIS — G89.29 CHRONIC LEFT SHOULDER PAIN: Primary | ICD-10-CM

## 2022-03-16 DIAGNOSIS — M25.512 CHRONIC LEFT SHOULDER PAIN: Primary | ICD-10-CM

## 2022-03-16 PROCEDURE — 97162 PT EVAL MOD COMPLEX 30 MIN: CPT | Performed by: PHYSICAL THERAPIST

## 2022-03-16 PROCEDURE — 97110 THERAPEUTIC EXERCISES: CPT | Performed by: PHYSICAL THERAPIST

## 2022-03-16 NOTE — PROGRESS NOTES
Physical Therapy Initial Evaluation and Plan of Care    Subjective Evaluation    History of Present Illness  Mechanism of injury: Pt is a 76 year old female presenting to PT with left shoulder and upper arm pain. A few years ago she tripped and fell on her outstretched left arm. She had pain in the same area at the time, but it went away. In the last year, the pain has returned. Laying on her left side at night hurts. Reaching behind her back to put a jacket on is very painful. She does an aerobic and light weight class 2x per week and a yoga class 2x per week. She denies numbness, tingling, and burning.      Patient Occupation: Retired Quality of life: excellent    Pain  Current pain ratin  At worst pain rating: 3  Quality: throbbing (soreness)  Aggravating factors: prolonged positioning, sleeping, repetitive movement, lifting and movement  Progression: no change    Hand dominance: right    Diagnostic Tests  No diagnostic tests performed    Patient Goals  Patient goals for therapy: decreased pain, increased motion, increased strength, independence with ADLs/IADLs and return to sport/leisure activities             Objective          Active Range of Motion   Left Shoulder   Flexion: 165 (painful arc) degrees   Abduction: 170 degrees     Right Shoulder   Flexion: 170 degrees   Abduction: 170 degrees     Additional Active Range of Motion Details  Apleys ER right: T2  Apleys ER left: T2  Apleys IR right: T4  Apleys IR left: T4 (pain)    Strength/Myotome Testing     Left Shoulder     Planes of Motion   Flexion: 4+   Abduction: 5   External rotation at 90°: 4+   Internal rotation at 0°: 5     Isolated Muscles   Biceps: 5     Right Shoulder     Planes of Motion   Flexion: 5   Abduction: 5   External rotation at 90°: 5   Internal rotation at 0°: 5     Isolated Muscles   Biceps: 5     Tests     Left Shoulder   Positive empty can, Hawkin's and lift-off.           Assessment & Plan     Assessment  Impairments: abnormal  muscle tone, abnormal or restricted ROM, activity intolerance, impaired physical strength, lacks appropriate home exercise program and pain with function  Functional Limitations: uncomfortable because of pain, reaching behind back, reaching overhead and unable to perform repetitive tasks  Assessment details: Pt is a 76 year old female presenting to the clinic with left shoulder pain, signs and symptoms consistent with rotator cuff tendinopathy. She has a painful arc of motion, pain with internal rotation, weakness with ER and flexion, and positive Dyer, empty can and lift off. Her impairments are causing her to have pain with reaching behind her back and lifting overhead. Pt would benefit from skilled PT services to address her impairments so she can reach her long term goals.  Prognosis: good    Goals  Plan Goals: SHORT TERM GOALS:     2 weeks  1. Pt I w/ HEP  2. Pt to demonstrate 5/5 MMT testing for all movements at the shoulder in order to promote stability while lifting.  3. Pt to demonstrate ability to perform 30 minutes continuous activity in the clinic without increase in pain     LONG TERM GOALS:   6 weeks  1. Pt to demonstrate AROM of the left shoulder to WNL to allow ability to perform all necessary functional activities  2. Pt to demonstrate ability to lift 5# OH with the left arm without increase in pain  3. Pt to tolerate 60 minutes continuous activity in the clinic without increase in pain     Plan  Therapy options: will be seen for skilled therapy services  Planned modality interventions: dry needling, electrical stimulation/Russian stimulation, high voltage pulsed current (pain management), microcurrent electrical stimulation, TENS, thermotherapy (hydrocollator packs) and cryotherapy  Planned therapy interventions: body mechanics training, functional ROM exercises, home exercise program, joint mobilization, manual therapy, neuromuscular re-education, postural training, soft tissue mobilization,  spinal/joint mobilization, strengthening, stretching and therapeutic activities  Duration in visits: 1  Duration in weeks: 8  Treatment plan discussed with: patient        Manual Therapy:         mins  18371;  Therapeutic Exercise:    10     mins  82246;     Neuromuscular Arcelia:        mins  36660;    Therapeutic Activity:    5      mins  62412;     Gait Training:           mins  02012;     Ultrasound:          mins  99484;    Electrical Stimulation:         mins  62564 ( );  Dry Needling          mins self-pay    Timed Treatment:   15   mins   Total Treatment:     45   mins    PT SIGNATURE: Almita Castellano, BLANCA   DATE TREATMENT INITIATED: 3/16/2022    Initial Certification  Certification Period: 3/16/2022 thru 6/13/2022  I certify that the therapy services are furnished while this patient is under my care.  The services outlined above are required by this patient, and will be reviewed every 90 days.     PHYSICIAN: Gabby Kebede MD      DATE:     Please sign and return via fax to [phone].. Thank you, [facility].

## 2022-03-30 ENCOUNTER — TREATMENT (OUTPATIENT)
Dept: PHYSICAL THERAPY | Facility: CLINIC | Age: 77
End: 2022-03-30

## 2022-03-30 DIAGNOSIS — G89.29 CHRONIC LEFT SHOULDER PAIN: Primary | ICD-10-CM

## 2022-03-30 DIAGNOSIS — M25.512 CHRONIC LEFT SHOULDER PAIN: Primary | ICD-10-CM

## 2022-03-30 PROCEDURE — 97112 NEUROMUSCULAR REEDUCATION: CPT | Performed by: PHYSICAL THERAPIST

## 2022-03-30 PROCEDURE — 97110 THERAPEUTIC EXERCISES: CPT | Performed by: PHYSICAL THERAPIST

## 2022-03-30 NOTE — PROGRESS NOTES
Physical Therapy Daily Progress Note    Subjective   Brian Pablo reports: she has been diligent about doing her HEP. She cannot tell if she notices a difference yet.      Objective   See Exercise, Manual, and Modality Logs for complete treatment.       Assessment/Plan   Pt tolerated treatment well. She is able to perform all exercises with min cues for posture. She was educated on an updated HEP. She is going out of town so she will return in one month. She was educated on avoiding pain that reaches above a 5/10.     Progress per Plan of Care           Manual Therapy:         mins  16842;  Therapeutic Exercise:    23     mins  89836;     Neuromuscular Arcelia:    17    mins  19773;    Therapeutic Activity:          mins  90852;     Gait Training:           mins  55593;     Ultrasound:          mins  32579;    Electrical Stimulation:         mins  83088 ( );  E-Stim Attended:         mins  78211  Iontophoresis          mins 44497   Traction          mins  89403  Fluidotherapy          mins  73305  Dry Needling          mins self-pay - No Charge  Paraffin          mins  70446    Timed Treatment:   40   mins   Total Treatment:     40   mins    Almita Castellano, PT, DPT  Physical Therapist

## 2022-04-27 RX ORDER — ISOSORBIDE MONONITRATE 30 MG/1
TABLET, EXTENDED RELEASE ORAL
Qty: 90 TABLET | Refills: 3 | Status: SHIPPED | OUTPATIENT
Start: 2022-04-27

## 2022-04-27 RX ORDER — ACEBUTOLOL HYDROCHLORIDE 200 MG/1
CAPSULE ORAL
Qty: 180 CAPSULE | Refills: 3 | Status: SHIPPED | OUTPATIENT
Start: 2022-04-27

## 2022-04-28 NOTE — PROGRESS NOTES
Subjective:     Encounter Date:04/29/2022    Patient ID: Brian Pablo is a 76 y.o.  white female from [place], [place], retired speech pathologist.     REFERRING HEALTHCARE PROVIDER: SHIRAZ Wakefield  INTERNIST:  Gabby Kebede MD  NEUROSURGEON: Jw Hunter MD  DERMATOLOGIST: JESSY Mcgregor/Reyes Farrar MD    Chief Complaint:   Chief Complaint   Patient presents with   • Palpitations       Problem List:  1. Palpitations:  a. Holter monitor, 11/01/2018: Demonstrated sinus rhythm, 8 single VEs, 135 single SVEs, 10 paired.  Symptoms consistent with PACs and PVCs which are less than 1%  b. Residual class I symptoms with acceptable EKG  c. Echocardiogram, 12/21/2018: EF 0.68. Mild-to-moderate TR. Left ventricular diastolic dysfunction is abnormal consistent with: age. No evidence of pericardial effusion. Mild pulmonary hypertension.   d. Residual class I symptoms, January 2019, June 2019  e. Intermittent CCS class II exertional dyspnea and fatigue with increased palpitation, April 2021.  f. Acceptable negative quantitative SPECT gated Cardiolite exercise stress test without anginal type chest discomfort, ischemic electrocardiographic changes, or myocardial perfusion abnormalities suggestive of low probability for significant focal obstructive coronary artery disease with preserved systolic left ventricular function (LVEF 0.74) following exercise to 91% predicted maximum heart rate and 158% predicted exercise capacity, May 2021  g. Event monitor in April/May 2021: Occasional PACs, 18 episodes of SVT, occasional PVCs, no ventricular tachycardia, no AV block  h. Residual class I symptoms October 2021, April 2022  2. Hyperlipidemia; ASCVD 10-year risk 14.7%, 10.8% if treated  3. Positional vertigo  4. Osteoarthritis  5. Surgical history:   a. Colonoscopy with polyp removal  b. C5-C6 fusion    Allergies   Allergen Reactions   • Sulfamethoxazole GI Intolerance       Current Outpatient  Medications:   •  acebutolol (SECTRAL) 200 MG capsule, TAKE 1 CAPSULE BY MOUTH TWICE A DAY, Disp: 180 capsule, Rfl: 3  •  calcium carbonate-vitamin d 600-400 MG-UNIT per tablet, Take  by mouth Daily. Take as Directed, Disp: , Rfl:   •  conjugated estrogens (PREMARIN) 0.625 MG/GM vaginal cream, Insert  into the vagina 1 (One) Time Per Week. As Directed once a week, Disp: 30 g, Rfl: 1  •  CRANBERRY SOFT PO, Take 4,200 mg by mouth Daily., Disp: , Rfl:   •  isosorbide mononitrate (IMDUR) 30 MG 24 hr tablet, TAKE 1 TABLET BY MOUTH EVERY DAY, Disp: 90 tablet, Rfl: 3  •  Multiple Vitamins-Minerals (MULTIVITAMIN ADULT PO), Take 1 tablet by mouth daily., Disp: , Rfl:   •  nitroglycerin (NITROSTAT) 0.4 MG SL tablet, 1 under the tongue as needed for angina, may repeat q5mins for up three doses, Disp: 100 tablet, Rfl: 11  •  fluticasone (FLONASE) 50 MCG/ACT nasal spray, USE 1 SPRAY IN EACH NOSTRIL ONCE DAILY AT BEDTIME, Disp: , Rfl:   •  ipratropium (ATROVENT) 0.03 % nasal spray, SPRAY 2 SPRAYS BY INTRANASAL ROUTE TWICE A DAY AS NEEDED, Disp: , Rfl:     History of Present Illness: Patient returns for scheduled 6-month follow up. Since last visit, patient has been doing well overall from a cardiovascular standpoint. Patient reports that she is able to do an hour combined of aerobic exercises, floor exercises, and light weights without cardiopulmonary complaints. Patient just got back from a two-week road trip to visit family members all over the [place]. Patient denies chest pain, shortness of breath, palpitations, edema, dizziness, and syncope. She has had no interim ER visits, hospitalizations, serious illnesses, or surgeries. She has not had to use her nitroglycerin since receiving the initial prescription. She states that she has done very well on acebutolol without any cardiopulmonary complaints.    ROS   Obtained and negative except as outlined in problem list and HPI.    Procedures       Objective:       Vitals:     "04/29/22 0907 04/29/22 0914   BP: 135/77 111/69   BP Location: Left arm Left arm   Patient Position: Sitting Standing   Pulse: 64 66   SpO2: 99%    Weight: 61.2 kg (135 lb)    Height: 154.9 cm (60.98\")      Body mass index is 25.52 kg/m².  Last weight: 133 lbs    Vitals reviewed.   Constitutional:       Appearance: Well-developed.   Neck:      Thyroid: No thyromegaly.      Vascular: No carotid bruit or JVD.      Lymphadenopathy: No cervical adenopathy.   Pulmonary:      Effort: Pulmonary effort is normal.      Breath sounds: Normal breath sounds. No wheezing. No rhonchi. No rales.   Cardiovascular:      Regular rhythm.      No gallop. No S3 gallop.   Pulses:     Dorsalis pedis: 2+ bilaterally.     Posterior tibial: 2+ bilaterally.  Abdominal:      Palpations: Abdomen is soft. There is no abdominal mass.      Tenderness: There is no abdominal tenderness.   Musculoskeletal: Normal range of motion. Skin:     General: Skin is warm and dry.      Findings: No rash.   Neurological:      Mental Status: Alert and oriented to person, place, and time.           Lab Review:     No recent laboratory studies available for review today.    Lab Results   Component Value Date    GLUCOSE 91 09/07/2021    BUN 8 09/07/2021    CREATININE 0.87 09/07/2021    EGFRIFNONA 63 09/07/2021    BCR 9.2 09/07/2021     09/07/2021    K 4.7 09/07/2021     09/07/2021    CO2 26.3 09/07/2021    CALCIUM 9.4 09/07/2021    ALBUMIN 4.70 09/07/2021    AST 22 09/07/2021    ALT 15 09/07/2021       Lab Results   Component Value Date    WBC 5.27 09/07/2021    HGB 13.5 09/07/2021    HCT 41.7 09/07/2021    MCV 92.1 09/07/2021     09/07/2021       Lab Results   Component Value Date    TSH 1.720 09/07/2021       Lab Results   Component Value Date    CHOL 216 (H) 09/07/2021    CHOL 194 09/01/2020    CHOL 187 08/28/2019     Lab Results   Component Value Date    TRIG 45 09/07/2021    TRIG 66 09/01/2020    TRIG 65 08/28/2019     Lab Results   Component " Value Date    HDL 82 (H) 09/07/2021    HDL 69 (H) 09/01/2020    HDL 69 (H) 08/28/2019     Lab Results   Component Value Date     (H) 09/07/2021     (H) 09/01/2020     (H) 08/28/2019     Mammo Screening Digital Tomosynthesis Bilateral With CAD, 12/08/2021  · FINDINGS:  The breast tissue is almost entirely fatty in density.  The fibroglandular pattern is stable.  There are no suspicious masses, worrisome calcifications, nonsurgical areas of architectural distortion, or other secondary signs of malignancy.  · IMPRESSION: No mammographic findings suspicious for malignancy.    DEXA Bone Density Axial, 12/08/2021  · RESULTS:  · Lumbar Spine:  The BMD measured in the L1-L4 region is 1.319 g/cm2.  The average T-score is 2.5. The Z-score is 5.0.  · Total Hip:  The BMD measured at the left total proximal femur is 0.910 g/cm2.  The T-score is -0.3. The Z-score is 1.6.  · Femoral Neck:  The BMD measured at the left femoral neck is 0.727 g/cm2.  The T-score is -1.1. The Z-score is 1.1.   · 1/3 Radius:  The BMD measured at the right one-third radius is 0.639 g/cm2.  The T-score is -0.9. The Z-score is 1.9.    · IMPRESSION: Osteopenia of the left femoral neck. The ten year fracture risk assessment was not calculated because the patient has reported a previous hip, or vertebral fracture.          Assessment:       Overall continued acceptable course with no new interim cardiopulmonary complaints with good functional status. We will defer additional diagnostic or therapeutic intervention from a cardiac perspective at this time.     Diagnosis Plan   1. Palpitations  Stable and asymptomatic. Continue current treatment.   2. Essential hypertension  Well controlled. Continue current treatment.   3. Dyslipidemia  Suboptimal control. Continue current treatment.          Plan:         1. Patient to continue current medications and close follow up with the above providers.  2. Patient is encouraged to continue a regular  aerobic exercise routine, at least 30 minutes daily, 4-5 days per week.  3. Tentative cardiology follow up in April 2023 or patient may return sooner PRN.  4. 1 800 card    Scribed for John Staton MD by Tania Joshua. 4/29/2022  09:21 EDT    I, John Staton MD, [facility], personally performed the services described in this documentation as scribed by the above named individual in my presence, and it is both accurate and complete. At 09:28 EDT on 04/29/2022

## 2022-04-29 ENCOUNTER — OFFICE VISIT (OUTPATIENT)
Dept: CARDIOLOGY | Facility: CLINIC | Age: 77
End: 2022-04-29

## 2022-04-29 VITALS
WEIGHT: 135 LBS | OXYGEN SATURATION: 99 % | DIASTOLIC BLOOD PRESSURE: 69 MMHG | HEIGHT: 61 IN | BODY MASS INDEX: 25.49 KG/M2 | SYSTOLIC BLOOD PRESSURE: 111 MMHG | HEART RATE: 66 BPM

## 2022-04-29 DIAGNOSIS — I10 ESSENTIAL HYPERTENSION: ICD-10-CM

## 2022-04-29 DIAGNOSIS — E78.5 DYSLIPIDEMIA: ICD-10-CM

## 2022-04-29 DIAGNOSIS — R00.2 PALPITATIONS: Primary | ICD-10-CM

## 2022-04-29 PROCEDURE — 99214 OFFICE O/P EST MOD 30 MIN: CPT | Performed by: INTERNAL MEDICINE

## 2022-04-29 RX ORDER — IPRATROPIUM BROMIDE 21 UG/1
SPRAY, METERED NASAL
COMMUNITY
Start: 2022-02-22

## 2022-04-29 RX ORDER — FLUTICASONE PROPIONATE 50 MCG
SPRAY, SUSPENSION (ML) NASAL
COMMUNITY
Start: 2022-02-22 | End: 2022-08-03

## 2022-05-02 ENCOUNTER — TREATMENT (OUTPATIENT)
Dept: PHYSICAL THERAPY | Facility: CLINIC | Age: 77
End: 2022-05-02

## 2022-05-02 DIAGNOSIS — M25.512 CHRONIC LEFT SHOULDER PAIN: Primary | ICD-10-CM

## 2022-05-02 DIAGNOSIS — G89.29 CHRONIC LEFT SHOULDER PAIN: Primary | ICD-10-CM

## 2022-05-02 PROCEDURE — 97110 THERAPEUTIC EXERCISES: CPT | Performed by: PHYSICAL THERAPIST

## 2022-05-02 PROCEDURE — 97112 NEUROMUSCULAR REEDUCATION: CPT | Performed by: PHYSICAL THERAPIST

## 2022-05-02 NOTE — PROGRESS NOTES
Physical Therapy Daily Progress Note    Subjective   Brian Pablo reports: she is doing so much better. She does not really have any pain unless she reaches awkwardly.       Objective          Active Range of Motion     Right Shoulder   Flexion: 170 degrees   Abduction: 170 degrees     Strength/Myotome Testing     Right Shoulder     Planes of Motion   Flexion: 5   Abduction: 5   External rotation at 0°: 4+   Internal rotation at 0°: 5     Tests     Right Shoulder   Positive Hawkin's.   Negative empty can and lift-off.       See Exercise, Manual, and Modality Logs for complete treatment.       Assessment/Plan   Pt is a 76 year old female who has come to PT for right shoulder pain. Her ROM and strength have improved. She no longer has a positive lift off or empty can test. She is independent with her HEP. At this time, she is safe for discharge.     Other  Discharge         Manual Therapy:         mins  11796;  Therapeutic Exercise:    15     mins  67752;     Neuromuscular Arcelia:    10    mins  24052;    Therapeutic Activity:          mins  74578;     Gait Training:           mins  75922;     Ultrasound:          mins  29310;    Electrical Stimulation:         mins  86455 ( );  E-Stim Attended:         mins  44846  Iontophoresis          mins 72751   Traction          mins  04787  Fluidotherapy          mins  74278  Dry Needling          mins self-pay - No Charge  Paraffin          mins  72845    Timed Treatment:   25   mins   Total Treatment:     43   mins    Almita Castellano, PT, DPT  Physical Therapist

## 2022-07-05 ENCOUNTER — TELEPHONE (OUTPATIENT)
Dept: INTERNAL MEDICINE | Facility: CLINIC | Age: 77
End: 2022-07-05

## 2022-07-05 PROBLEM — U07.1 COVID-19 VIRUS INFECTION: Status: ACTIVE | Noted: 2022-07-05

## 2022-07-05 NOTE — TELEPHONE ENCOUNTER
Caller: Brian Pablo    Relationship to patient: Self    Best call back number: [phone]    Date of positive COVID19 test: 07/04/2022    COVID19 symptoms: COUGH, CHILLS, GENERAL MALAISE    Additional information or concerns: PATIENT STARTED SYMPTOMS ON 07/03/2022 AND TESTED 07/04/2022. PATIENT IS INTERESTED IN GETTING THE PLAXOVID MEDICATION TO HELP WITH HER SYMPTOMS. PLEASE ADVISE AND CALL PATIENT BACK    What is the patients preferred pharmacy: [facility] [phone]

## 2022-07-06 NOTE — TELEPHONE ENCOUNTER
Regardless of whether she takes paxlovid, she is advised to pursue aggressive water intake daily, lots of rest, and symptomatic treatment with antihistamine,flonase, mucinex DM, and ibuprofen as needed.    Paxlovid needs to be started within 5 days of symptoms onset and has lots of drug interactions. It is listed as moderate risk of interactions when taken with acebutolol; cardiac and neurologic events have been reported.    If sxs are mild, take OTC meds as noted above.If mod-severe sxs, should consult with cardiology re: what to do with acebutolol or just to monitor closely    What does she want to do?

## 2022-08-03 PROCEDURE — 87205 SMEAR GRAM STAIN: CPT | Performed by: NURSE PRACTITIONER

## 2022-08-03 PROCEDURE — U0004 COV-19 TEST NON-CDC HGH THRU: HCPCS | Performed by: NURSE PRACTITIONER

## 2022-08-03 PROCEDURE — 87070 CULTURE OTHR SPECIMN AEROBIC: CPT | Performed by: NURSE PRACTITIONER

## 2022-08-05 PROBLEM — J30.0 VASOMOTOR RHINITIS: Status: ACTIVE | Noted: 2022-08-05

## 2022-08-18 NOTE — PROGRESS NOTES
"Chief Complaint   Patient presents with   • Vaginitis   • Vaginal Discharge   • Vaginal Itching       History of Present Illness  76 y.o.  woman presents for further eval of vaginal discharge, itching, and irritation.    HPI started 2 wks ago with severe sore throat. Went to [facility] when it was not getting better and was RX'd amoxicillin. Took about 5 doses of med and stopped due to neg cx.     Around that time, she started developing vaginal itching, followed by external irritation with redness and mild swelling. Notes some relief with HC cream. Subsequently developed white, chunky discharge.    Has been eating yogurt 2-3x/day and notes improvement in discharge, absent this AM. External itching has improving.    Sore throat has resolved.     Review of Systems  ROS (+) for vaginal discharge and irritation as noted. Denies abnl vaginal bleeding. All other ROS reviewed and negative.      Current Outpatient Medications:   •  acebutolol (SECTRAL) 200 MG BID  •  calcium carbonate-vitamin d 600-400 MG-UNIT QD  •  conjugated estrogens (PREMARIN) 0.625 MG/GM vaginal cream 1x/wk  •  CRANBERRY SOFT PO, Take 4,200 mg QD  •  ipratropium (ATROVENT) 0.03 % nasal spray prn  •  isosorbide mononitrate (IMDUR) 30 MG QD  •  Multiple Vitamins-Minerals QD  •  nitroglycerin (NITROSTAT) 0.4 MG SL prn      VITALS:  /64   Pulse 64   Ht 154.9 cm (61\")   Wt 59.9 kg (132 lb)   SpO2 99%   BMI 24.94 kg/m²     Physical Exam  Vitals and nursing note reviewed.   Constitutional:       General: She is not in acute distress.     Appearance: Normal appearance. She is not ill-appearing.   Eyes:      Extraocular Movements: Extraocular movements intact.      Conjunctiva/sclera: Conjunctivae normal.   Pulmonary:      Effort: Pulmonary effort is normal. No respiratory distress.   Genitourinary:     Comments: Chaperone devcined by patient.  Pelvic exam performed with minimal erythema at the vaginal opening - no tears or swelling; other nl " external genitalia; normal urethral meatus and urethral exam; mildly atrophic vagina mucosa, mod thick white-green discharge in the vaginal vault; no masses palpated/seen and no CMT or adnexal tenderness with palpation; no bladder abnormality noted; perineum intact.    Neurological:      Mental Status: She is alert and oriented to person, place, and time. Mental status is at baseline.   Psychiatric:         Mood and Affect: Mood normal.         Behavior: Behavior normal.         LABS  Results for orders placed or performed during the hospital encounter of 08/03/22   Wound Culture - Wound, Throat    Specimen: Throat; Wound   Result Value Ref Range    Wound Culture Light growth (2+) Normal Throat Lashae     Gram Stain Rare (1+) WBCs seen     Gram Stain       Rare (1+) Mixed bacterial morphotypes seen on Gram Stain   COVID-19 PCR, [facility], NP SWAB IN LEXAR VIRAL TRANSPORT MEDIA/ORAL SWISH 24-30 HR TAT - Swab, Nasopharynx    Specimen: Nasopharynx; Swab   Result Value Ref Range    SARS-CoV-2 LANCE Not Detected Not Detected   POC Rapid Strep A    Specimen: Swab   Result Value Ref Range    Rapid Strep A Screen Negative Negative, VALID, INVALID, Not Performed    Internal Control Passed Passed    Lot Number [id number]     Expiration Date 04/10/2025          ASSESSMENT/PLAN    Diagnoses and all orders for this visit:    1. Vaginal discharge (Primary)  Comments:  vaginal swab taken; empiric tx with diflucan 150mg #1, 1RF due to recent abx use; in future, consider lactobacillus acidophilus supplement w/ abx  Orders:  -     NuSwab VG+ - Swab, Vagina  -     fluconazole (Diflucan) 150 MG tablet; Take 1 tablet by mouth 1 (One) Time for 1 dose.  Dispense: 1 tablet; Refill: 0    2. Vagina itching  Comments:  minimal at vaginal opening, ok to use OTC yeast cream externally BID; rec keeping area dry    3. Essential hypertension  Assessment & Plan:  BP remains stable 118/64; no meds except acebutolol per cards      4. Sore  throat  Comments:  resolved, neg throat cx, s/p 5 doses amox      FOLLOW-UP  1. Health maintenance - COVID19 vacc done, incl 4th dose booster  2. RTC for next wellness 9/27/22; fasting labs prior to appt (CBC, CMP, TSH, lipids, UA/micr)    Electronically signed by:    Gabby Kebede MD, FACP  08/19/2022

## 2022-08-19 ENCOUNTER — OFFICE VISIT (OUTPATIENT)
Dept: INTERNAL MEDICINE | Facility: CLINIC | Age: 77
End: 2022-08-19

## 2022-08-19 VITALS
DIASTOLIC BLOOD PRESSURE: 64 MMHG | WEIGHT: 132 LBS | OXYGEN SATURATION: 99 % | SYSTOLIC BLOOD PRESSURE: 118 MMHG | BODY MASS INDEX: 24.92 KG/M2 | HEART RATE: 64 BPM | HEIGHT: 61 IN

## 2022-08-19 DIAGNOSIS — N89.8 VAGINAL DISCHARGE: Primary | ICD-10-CM

## 2022-08-19 DIAGNOSIS — I10 ESSENTIAL HYPERTENSION: Chronic | ICD-10-CM

## 2022-08-19 DIAGNOSIS — J02.9 SORE THROAT: ICD-10-CM

## 2022-08-19 DIAGNOSIS — N89.8 VAGINA ITCHING: ICD-10-CM

## 2022-08-19 PROCEDURE — 87798 DETECT AGENT NOS DNA AMP: CPT | Performed by: INTERNAL MEDICINE

## 2022-08-19 PROCEDURE — 99214 OFFICE O/P EST MOD 30 MIN: CPT | Performed by: INTERNAL MEDICINE

## 2022-08-19 PROCEDURE — 87801 DETECT AGNT MULT DNA AMPLI: CPT | Performed by: INTERNAL MEDICINE

## 2022-08-19 PROCEDURE — 87491 CHLMYD TRACH DNA AMP PROBE: CPT | Performed by: INTERNAL MEDICINE

## 2022-08-19 PROCEDURE — 87591 N.GONORRHOEAE DNA AMP PROB: CPT | Performed by: INTERNAL MEDICINE

## 2022-08-19 PROCEDURE — 87661 TRICHOMONAS VAGINALIS AMPLIF: CPT | Performed by: INTERNAL MEDICINE

## 2022-08-19 RX ORDER — FLUCONAZOLE 150 MG/1
150 TABLET ORAL ONCE
Qty: 1 TABLET | Refills: 0 | Status: SHIPPED | OUTPATIENT
Start: 2022-08-19 | End: 2022-08-19

## 2022-08-23 LAB
A VAGINAE DNA VAG QL NAA+PROBE: ABNORMAL SCORE
BVAB2 DNA VAG QL NAA+PROBE: ABNORMAL SCORE
C ALBICANS DNA VAG QL NAA+PROBE: POSITIVE
C GLABRATA DNA VAG QL NAA+PROBE: NEGATIVE
C TRACH DNA VAG QL NAA+PROBE: NEGATIVE
MEGA1 DNA VAG QL NAA+PROBE: ABNORMAL SCORE
N GONORRHOEA DNA VAG QL NAA+PROBE: NEGATIVE
T VAGINALIS DNA VAG QL NAA+PROBE: NEGATIVE

## 2022-09-12 ENCOUNTER — PATIENT MESSAGE (OUTPATIENT)
Dept: INTERNAL MEDICINE | Facility: CLINIC | Age: 77
End: 2022-09-12

## 2022-09-12 DIAGNOSIS — E78.00 PURE HYPERCHOLESTEROLEMIA: ICD-10-CM

## 2022-09-12 DIAGNOSIS — Z00.00 MEDICARE ANNUAL WELLNESS VISIT, SUBSEQUENT: Primary | ICD-10-CM

## 2022-09-16 ENCOUNTER — LAB (OUTPATIENT)
Dept: LAB | Facility: HOSPITAL | Age: 77
End: 2022-09-16

## 2022-09-16 DIAGNOSIS — Z00.00 MEDICARE ANNUAL WELLNESS VISIT, SUBSEQUENT: ICD-10-CM

## 2022-09-16 DIAGNOSIS — E78.00 PURE HYPERCHOLESTEROLEMIA: ICD-10-CM

## 2022-09-16 LAB
ALBUMIN SERPL-MCNC: 4.2 G/DL (ref 3.5–5.2)
ALBUMIN/GLOB SERPL: 1.8 G/DL
ALP SERPL-CCNC: 48 U/L (ref 39–117)
ALT SERPL W P-5'-P-CCNC: 14 U/L (ref 1–33)
ANION GAP SERPL CALCULATED.3IONS-SCNC: 11.7 MMOL/L (ref 5–15)
AST SERPL-CCNC: 27 U/L (ref 1–32)
BACTERIA UR QL AUTO: NORMAL /HPF
BASOPHILS # BLD AUTO: 0.04 10*3/MM3 (ref 0–0.2)
BASOPHILS NFR BLD AUTO: 0.9 % (ref 0–1.5)
BILIRUB SERPL-MCNC: 0.7 MG/DL (ref 0–1.2)
BILIRUB UR QL STRIP: NEGATIVE
BUN SERPL-MCNC: 9 MG/DL (ref 8–23)
BUN/CREAT SERPL: 11.1 (ref 7–25)
CALCIUM SPEC-SCNC: 9.6 MG/DL (ref 8.6–10.5)
CHLORIDE SERPL-SCNC: 101 MMOL/L (ref 98–107)
CHOLEST SERPL-MCNC: 209 MG/DL (ref 0–200)
CLARITY UR: CLEAR
CO2 SERPL-SCNC: 25.3 MMOL/L (ref 22–29)
COLOR UR: YELLOW
CREAT SERPL-MCNC: 0.81 MG/DL (ref 0.57–1)
DEPRECATED RDW RBC AUTO: 42.7 FL (ref 37–54)
EGFRCR SERPLBLD CKD-EPI 2021: 74.9 ML/MIN/1.73
EOSINOPHIL # BLD AUTO: 0.15 10*3/MM3 (ref 0–0.4)
EOSINOPHIL NFR BLD AUTO: 3.5 % (ref 0.3–6.2)
ERYTHROCYTE [DISTWIDTH] IN BLOOD BY AUTOMATED COUNT: 12.9 % (ref 12.3–15.4)
GLOBULIN UR ELPH-MCNC: 2.3 GM/DL
GLUCOSE SERPL-MCNC: 88 MG/DL (ref 65–99)
GLUCOSE UR STRIP-MCNC: NEGATIVE MG/DL
HCT VFR BLD AUTO: 37.7 % (ref 34–46.6)
HDLC SERPL-MCNC: 82 MG/DL (ref 40–60)
HGB BLD-MCNC: 12.8 G/DL (ref 12–15.9)
HGB UR QL STRIP.AUTO: NEGATIVE
HYALINE CASTS UR QL AUTO: NORMAL /LPF
IMM GRANULOCYTES # BLD AUTO: 0.02 10*3/MM3 (ref 0–0.05)
IMM GRANULOCYTES NFR BLD AUTO: 0.5 % (ref 0–0.5)
KETONES UR QL STRIP: NEGATIVE
LDLC SERPL CALC-MCNC: 116 MG/DL (ref 0–100)
LDLC/HDLC SERPL: 1.4 {RATIO}
LEUKOCYTE ESTERASE UR QL STRIP.AUTO: NEGATIVE
LYMPHOCYTES # BLD AUTO: 1.42 10*3/MM3 (ref 0.7–3.1)
LYMPHOCYTES NFR BLD AUTO: 33.3 % (ref 19.6–45.3)
MCH RBC QN AUTO: 30 PG (ref 26.6–33)
MCHC RBC AUTO-ENTMCNC: 34 G/DL (ref 31.5–35.7)
MCV RBC AUTO: 88.5 FL (ref 79–97)
MONOCYTES # BLD AUTO: 0.51 10*3/MM3 (ref 0.1–0.9)
MONOCYTES NFR BLD AUTO: 12 % (ref 5–12)
NEUTROPHILS NFR BLD AUTO: 2.12 10*3/MM3 (ref 1.7–7)
NEUTROPHILS NFR BLD AUTO: 49.8 % (ref 42.7–76)
NITRITE UR QL STRIP: NEGATIVE
NRBC BLD AUTO-RTO: 0 /100 WBC (ref 0–0.2)
PH UR STRIP.AUTO: 7.5 [PH] (ref 5–8)
PLATELET # BLD AUTO: 224 10*3/MM3 (ref 140–450)
PMV BLD AUTO: 9.2 FL (ref 6–12)
POTASSIUM SERPL-SCNC: 4 MMOL/L (ref 3.5–5.2)
PROT SERPL-MCNC: 6.5 G/DL (ref 6–8.5)
PROT UR QL STRIP: NEGATIVE
RBC # BLD AUTO: 4.26 10*6/MM3 (ref 3.77–5.28)
RBC # UR STRIP: NORMAL /HPF
REF LAB TEST METHOD: NORMAL
SODIUM SERPL-SCNC: 138 MMOL/L (ref 136–145)
SP GR UR STRIP: 1.01 (ref 1–1.03)
SQUAMOUS #/AREA URNS HPF: NORMAL /HPF
TRIGL SERPL-MCNC: 62 MG/DL (ref 0–150)
TSH SERPL DL<=0.05 MIU/L-ACNC: 1.97 UIU/ML (ref 0.27–4.2)
UROBILINOGEN UR QL STRIP: NORMAL
VLDLC SERPL-MCNC: 11 MG/DL (ref 5–40)
WBC # UR STRIP: NORMAL /HPF
WBC NRBC COR # BLD: 4.26 10*3/MM3 (ref 3.4–10.8)

## 2022-09-16 PROCEDURE — 84443 ASSAY THYROID STIM HORMONE: CPT

## 2022-09-16 PROCEDURE — 80053 COMPREHEN METABOLIC PANEL: CPT

## 2022-09-16 PROCEDURE — 80061 LIPID PANEL: CPT

## 2022-09-16 PROCEDURE — 81001 URINALYSIS AUTO W/SCOPE: CPT

## 2022-09-16 PROCEDURE — 85025 COMPLETE CBC W/AUTO DIFF WBC: CPT

## 2022-09-27 ENCOUNTER — OFFICE VISIT (OUTPATIENT)
Dept: INTERNAL MEDICINE | Facility: CLINIC | Age: 77
End: 2022-09-27

## 2022-09-27 ENCOUNTER — TRANSCRIBE ORDERS (OUTPATIENT)
Dept: ADMINISTRATIVE | Facility: HOSPITAL | Age: 77
End: 2022-09-27

## 2022-09-27 VITALS
DIASTOLIC BLOOD PRESSURE: 70 MMHG | WEIGHT: 134 LBS | HEIGHT: 61 IN | SYSTOLIC BLOOD PRESSURE: 124 MMHG | HEART RATE: 66 BPM | OXYGEN SATURATION: 98 % | BODY MASS INDEX: 25.3 KG/M2

## 2022-09-27 DIAGNOSIS — Z00.00 MEDICARE ANNUAL WELLNESS VISIT, SUBSEQUENT: Primary | Chronic | ICD-10-CM

## 2022-09-27 DIAGNOSIS — Z12.31 VISIT FOR SCREENING MAMMOGRAM: Primary | ICD-10-CM

## 2022-09-27 DIAGNOSIS — L50.9 URTICARIA: ICD-10-CM

## 2022-09-27 DIAGNOSIS — E78.00 PURE HYPERCHOLESTEROLEMIA: Chronic | ICD-10-CM

## 2022-09-27 DIAGNOSIS — I10 ESSENTIAL HYPERTENSION: Chronic | ICD-10-CM

## 2022-09-27 DIAGNOSIS — H25.9 AGE-RELATED CATARACT OF BOTH EYES, UNSPECIFIED AGE-RELATED CATARACT TYPE: ICD-10-CM

## 2022-09-27 DIAGNOSIS — K90.49 FOOD INTOLERANCE: ICD-10-CM

## 2022-09-27 DIAGNOSIS — Z23 NEED FOR INFLUENZA VACCINATION: ICD-10-CM

## 2022-09-27 DIAGNOSIS — Z23 NEED FOR VACCINATION: ICD-10-CM

## 2022-09-27 PROBLEM — L29.9 PRURITUS: Status: ACTIVE | Noted: 2022-09-27

## 2022-09-27 PROCEDURE — 99497 ADVNCD CARE PLAN 30 MIN: CPT | Performed by: INTERNAL MEDICINE

## 2022-09-27 PROCEDURE — 0124A PR ADM SARSCOV2 30MCG/0.3ML BST: CPT | Performed by: INTERNAL MEDICINE

## 2022-09-27 PROCEDURE — 1126F AMNT PAIN NOTED NONE PRSNT: CPT | Performed by: INTERNAL MEDICINE

## 2022-09-27 PROCEDURE — 1159F MED LIST DOCD IN RCRD: CPT | Performed by: INTERNAL MEDICINE

## 2022-09-27 PROCEDURE — G0008 ADMIN INFLUENZA VIRUS VAC: HCPCS | Performed by: INTERNAL MEDICINE

## 2022-09-27 PROCEDURE — 91312 COVID-19 (PFIZER) BIVALENT BOOSTER 12+YRS: CPT | Performed by: INTERNAL MEDICINE

## 2022-09-27 PROCEDURE — G0439 PPPS, SUBSEQ VISIT: HCPCS | Performed by: INTERNAL MEDICINE

## 2022-09-27 PROCEDURE — 99213 OFFICE O/P EST LOW 20 MIN: CPT | Performed by: INTERNAL MEDICINE

## 2022-09-27 PROCEDURE — 93000 ELECTROCARDIOGRAM COMPLETE: CPT | Performed by: INTERNAL MEDICINE

## 2022-09-27 PROCEDURE — 1170F FXNL STATUS ASSESSED: CPT | Performed by: INTERNAL MEDICINE

## 2022-09-27 PROCEDURE — 93042 RHYTHM ECG REPORT: CPT | Performed by: INTERNAL MEDICINE

## 2022-09-27 PROCEDURE — 99397 PER PM REEVAL EST PAT 65+ YR: CPT | Performed by: INTERNAL MEDICINE

## 2022-09-27 PROCEDURE — 90662 IIV NO PRSV INCREASED AG IM: CPT | Performed by: INTERNAL MEDICINE

## 2022-09-27 NOTE — PROGRESS NOTES
Immunization  Immunization performed in right deltoid by Elaina Casiano MA. Patient tolerated the procedure well without complications.  09/27/22   Elaina Casiano MA

## 2022-09-27 NOTE — ASSESSMENT & PLAN NOTE
Patient reports Dr. Keenan has been monitoring these cataracts for awhile and she is unsure about severity; she wants to consider second opinion

## 2022-09-27 NOTE — PROGRESS NOTES
ANNUAL WELLNESS VISIT    DRUG AND ALCOHOL USE      no alcohol use, no tobacco use and no caffeine use    DIET AND PHYSICAL ACTIVITY     Diet: general    Exercise: frequently   Exercise Details: aerobics, yoga, weights    MOOD DISORDER AND COGNITIVE SCREENING   Depression Screening Tool Used yes - see PHQ-9; components reviewed with patient; denies feeling blue, depressed, hopeless, or not having pleasure in doing things.  Screening is negative for active depression.   Anxiety Screening Tool Used yes     AUDIT screening 0     Mini-Cog Performed   Yes    1. Tell Patient 3 Words car apple pen    2. Administer Clock Test normal    3. Recall 3 words  car apple pen    4. Number Correct Items 3    FUNCTIONAL ABILITY AND LEVEL OF SAFETY   Hearing no hearing loss     Wears Hearing Aids No       Current Activities Independent      none  - see Funct/Cog Status Intake     Fall Risk Assessment       Has difficulty with walking or balance  No         Timed Up and Go (TUG) Test  6 sec.       If >12 sec, normal    ADVANCED DIRECTIVE  Advance Care Planning   ACP discussion was held with the patient during this visit. Patient has an advance directive in EMR which is still valid.   Advance Care Planning Discussion:  Patient has advanced directive and living will.  Reviewed desires for end of life care, which is to have comfort care.  Patient does not want extraordinary life-sustaining measures, including no prolonged artificial life support. Encouraged patient to ensure family is aware of desires/preferences. Confirmed son Walter is her healthcare surrogate.  Discussed importance of having the conversation with family, and she states this has been done.    PAIN SCREENING Do you have pain right now? no      If so, 1-10 scale: 0     Intermittent     Do you have pain every day? No      Probable chronic pain: No     Recent Hospitalizations:  No recent hospitalization(s)..     MEDICATION REVIEW   - updated and reviewed (see Medication  List).   - reviewed for potentially harmful drug-disease interactions in the elderly.   - reviewed for high risk medications in the elderly.   - aspirin use: No    BMI  Body mass index is 25.32 kg/m².  BMI is within normal parameters. No other follow-up for BMI required.       _________________________________________________________    Chief Complaint   Patient presents with   • Medicare Wellness-subsequent   • Hyperlipidemia   • Hypertension       History of Present Illness  77 y.o.  woman presents for updated phys examination and wellness visit as well as f/u on cholesterol.    Reports episodes of itching, sometimes with hives, but not always. Sometimes awakens her from sleep; sometimes feels reved up despite not drinking caffeine. Notes relief of itching with antihistamine. Has not tried daily antihistamine due to worries of becoming immune to the medication. Has been doing extensive research, including details of mast cell activation syndrome.  Has not had mouth or tongue swelling or anaphylactic symptoms.    Has found that she has sensitivities to certain foods.  Example would be pineapple that goes to her system.  Is also found sensitivity to artificial preservatives.    Reports Dr. Staton thought the itching could be related to her irreg heartbeat; was referred to allergist, who was not helpful.     Review of Systems  Denies headaches, visual changes (reports being monitored for cataracts but does not have any halos in her vision), CP, palpitations, SOB, cough, abd pain, n/v/d, difficulty with urination, numbness/tingling, falls, mood changes, lightheadedness, hearing changes, rashes.    ROS (+) for episodes of itching +/- hives, sometimes assoc'd with insomnia.    Denies vaginal discharge or bleeding (no periods) or breast concerns.   All other ROS reviewed and negative.    Current Outpatient Medications:   •  acebutolol (SECTRAL) 200 MG BID  •  calcium carbonate-vitamin d 600-400 MG-UNIT QD   •   "conjugated estrogens (PREMARIN) 0.625 MG/GM vaginal cream AD  •  CRANBERRY SOFT PO, Take 4,200 mg QD  •  ipratropium (ATROVENT) 0.03 % nasal spray AD  •  isosorbide mononitrate (IMDUR) 30 MG QD  •  Multiple Vitamins-Minerals QD  •  nitroglycerin (NITROSTAT) 0.4 MG SL prn      VITALS:  /70   Pulse 66   Ht 154.9 cm (61\")   Wt 60.8 kg (134 lb)   SpO2 98%   BMI 25.32 kg/m²     Physical Exam  Vitals and nursing note reviewed.   Constitutional:       General: She is not in acute distress.     Appearance: Normal appearance. She is well-developed.   HENT:      Head: Normocephalic.      Right Ear: Tympanic membrane, ear canal and external ear normal.      Left Ear: Tympanic membrane, ear canal and external ear normal.      Nose: Nose normal.   Eyes:      Extraocular Movements: Extraocular movements intact.      Conjunctiva/sclera: Conjunctivae normal.      Pupils: Pupils are equal, round, and reactive to light.   Neck:      Vascular: No carotid bruit (bilaterally).   Cardiovascular:      Rate and Rhythm: Normal rate and regular rhythm.      Heart sounds: Normal heart sounds.   Pulmonary:      Effort: Pulmonary effort is normal. No respiratory distress.      Breath sounds: Normal breath sounds. No wheezing or rales.   Abdominal:      General: Bowel sounds are normal. There is no distension.      Palpations: Abdomen is soft. There is no mass.      Tenderness: There is no abdominal tenderness.   Genitourinary:     Comments: Chaperone declined by patient.    Breast exam unremarkable without masses, skin changes, nipple discharge, or axillary adenopathy.    Musculoskeletal:      Cervical back: Normal range of motion and neck supple.      Right lower leg: No edema.      Left lower leg: No edema.   Lymphadenopathy:      Cervical: No cervical adenopathy.   Skin:     General: Skin is warm and dry.      Findings: No rash.   Neurological:      Mental Status: She is alert and oriented to person, place, and time.      Cranial " Nerves: No cranial nerve deficit.      Deep Tendon Reflexes: Reflexes normal.   Psychiatric:         Mood and Affect: Mood normal.         Behavior: Behavior normal.           LABS  Results for orders placed or performed in visit on 09/16/22   Comprehensive Metabolic Panel    Specimen: Blood   Result Value Ref Range    Glucose 88 65 - 99 mg/dL    BUN 9 8 - 23 mg/dL    Creatinine 0.81 0.57 - 1.00 mg/dL    Sodium 138 136 - 145 mmol/L    Potassium 4.0 3.5 - 5.2 mmol/L    Chloride 101 98 - 107 mmol/L    CO2 25.3 22.0 - 29.0 mmol/L    Calcium 9.6 8.6 - 10.5 mg/dL    Total Protein 6.5 6.0 - 8.5 g/dL    Albumin 4.20 3.50 - 5.20 g/dL    ALT (SGPT) 14 1 - 33 U/L    AST (SGOT) 27 1 - 32 U/L    Alkaline Phosphatase 48 39 - 117 U/L    Total Bilirubin 0.7 0.0 - 1.2 mg/dL    Globulin 2.3 gm/dL    A/G Ratio 1.8 g/dL    BUN/Creatinine Ratio 11.1 7.0 - 25.0    Anion Gap 11.7 5.0 - 15.0 mmol/L    eGFR 74.9 >60.0 mL/min/1.73   Lipid Panel    Specimen: Blood   Result Value Ref Range    Total Cholesterol 209 (H) 0 - 200 mg/dL    Triglycerides 62 0 - 150 mg/dL    HDL Cholesterol 82 (H) 40 - 60 mg/dL    LDL Cholesterol  116 (H) 0 - 100 mg/dL    VLDL Cholesterol 11 5 - 40 mg/dL    LDL/HDL Ratio 1.40    TSH    Specimen: Blood   Result Value Ref Range    TSH 1.970 0.270 - 4.200 uIU/mL   CBC Auto Differential    Specimen: Blood   Result Value Ref Range    WBC 4.26 3.40 - 10.80 10*3/mm3    RBC 4.26 3.77 - 5.28 10*6/mm3    Hemoglobin 12.8 12.0 - 15.9 g/dL    Hematocrit 37.7 34.0 - 46.6 %    MCV 88.5 79.0 - 97.0 fL    MCH 30.0 26.6 - 33.0 pg    MCHC 34.0 31.5 - 35.7 g/dL    RDW 12.9 12.3 - 15.4 %    RDW-SD 42.7 37.0 - 54.0 fl    MPV 9.2 6.0 - 12.0 fL    Platelets 224 140 - 450 10*3/mm3    Neutrophil % 49.8 42.7 - 76.0 %    Lymphocyte % 33.3 19.6 - 45.3 %    Monocyte % 12.0 5.0 - 12.0 %    Eosinophil % 3.5 0.3 - 6.2 %    Basophil % 0.9 0.0 - 1.5 %    Immature Grans % 0.5 0.0 - 0.5 %    Neutrophils, Absolute 2.12 1.70 - 7.00 10*3/mm3     Lymphocytes, Absolute 1.42 0.70 - 3.10 10*3/mm3    Monocytes, Absolute 0.51 0.10 - 0.90 10*3/mm3    Eosinophils, Absolute 0.15 0.00 - 0.40 10*3/mm3    Basophils, Absolute 0.04 0.00 - 0.20 10*3/mm3    Immature Grans, Absolute 0.02 0.00 - 0.05 10*3/mm3    nRBC 0.0 0.0 - 0.2 /100 WBC   Urinalysis without microscopic (no culture) - Urine, Clean Catch    Specimen: Urine, Clean Catch   Result Value Ref Range    Color, UA Yellow Yellow, Straw    Appearance, UA Clear Clear    pH, UA 7.5 5.0 - 8.0    Specific Gravity, UA 1.009 1.005 - 1.030    Glucose, UA Negative Negative    Ketones, UA Negative Negative    Bilirubin, UA Negative Negative    Blood, UA Negative Negative    Protein, UA Negative Negative    Leuk Esterase, UA Negative Negative    Nitrite, UA Negative Negative    Urobilinogen, UA 0.2 E.U./dL 0.2 - 1.0 E.U./dL   Urinalysis, Microscopic Only - Urine, Clean Catch    Specimen: Urine, Clean Catch   Result Value Ref Range    RBC, UA 0-2 None Seen, 0-2 /HPF    WBC, UA 0-2 None Seen, 0-2 /HPF    Bacteria, UA None Seen None Seen /HPF    Squamous Epithelial Cells, UA 0-2 None Seen, 0-2 /HPF    Hyaline Casts, UA None Seen None Seen /LPF    Methodology Automated Microscopy          ECG 12 Lead    Date/Time: 9/27/2022 10:30 AM  Performed by: Gabby Kebede MD  Authorized by: Gabby Kebede MD   Comparison: compared with previous ECG from 9/8/2020  Similar to previous ECG  Rhythm: sinus rhythm and sinus arrhythmia  Rate: normal  BPM: 61  Conduction: incomplete right bundle branch block  ST Segments: ST segments normal  T Waves: T waves normal  QRS axis: normal  Clinical impression comment: stable EKG            TODAY'S RHYTHM STRIP - sinus rhythm with sinus arrhythmia, no ectopy    ASSESSMENT/PLAN    Diagnoses and all orders for this visit:    1. Medicare annual wellness visit, subsequent (Primary)  Assessment & Plan:  Health maintenance - COVID19 vacc done, incl 4th dose booster; rec flu vaccine and proceeding with new  "COVID19 vacc (BOTH GIVEN TODAY); last flu vacc 9/21; Prevnar 7/15, PVX 5/11, Tdap 9/19 (next Td due 2029); Zostavax 2010; HAV and Shingrix done; mammo 12/8/21; no further Paps unless abnlities; DEXA 12/21, repeat 2023-24; colonosc 12/17, repeat 2022 per Dr. Link; eye exam w/ Dr. Keenan 3/22 (monitored for cataracts); dental exam q6 mos, done 6/22; (+) seat belt use    Consultants:  Patient Care Team:  Gabby Kebede MD as PCP - General  Gabby Kebede MD as PCP - Internal Medicine  Casey Link MD as Consulting Physician (Gastroenterology)  Anuel Diaz MD as Consulting Physician (Orthopedic Surgery)  Matthew Vanegas MD as Consulting Physician (Orthopedic Surgery)  FLOR Ying Jr., MD as Consulting Physician (Ophthalmology)  Melia Keenan MD as Consulting Physician (Ophthalmology)  Raghavendra Coburn MD as Consulting Physician (Otolaryngology)  John Staton MD as Consulting Physician (Cardiology)  Nestor Mcdaniels MD as Consulting Physician (Pediatric Allergy and Immunology)  Delfina Gill APRN as Nurse Practitioner (Dermatology)  Reyes Farrar MD as Consulting Physician (Dermatology)        Orders:  -     COVID-19 Bivalent Booster (Pfizer) 12+yrs    2. Essential hypertension  Assessment & Plan:  BP stable 124/70; on acebutolol 200mg BID per cards for SVT    Orders:  -     ECG 12 Lead    3. Hyperlipidemia  Assessment & Plan:  Lipids stable with ; no meds; decrease saturated fats and cholesterol in the diet; repeat lipids in 12 mos        4. Need for influenza vaccination  -     Fluzone High-Dose 65+yrs    5. Urticaria  Assessment & Plan:  NEW dx; Discussed that the term \"histamine intolerance\" is confusing because everybody has reaction to histamine, as that is the control use for allergy testing; discussed that she could have pruritus due to urticaria which is in the allergy spectrum; discussed the end of the spectrum would be muscle activation " syndrome, which patient does not have; discussed avoidance of foods that she has found to cause hives/itching versus GI upset; recommend 30-day trial of daily antihistamine to decrease episodes of itching and hives that awaken her from sleep      6. Food intolerance  Assessment & Plan:  Agree with avoidance of foods that trigger GI symptoms or urticaria/hives; discussed limitations of food allergy testing      7. Age-related cataract of both eyes, unspecified age-related cataract type  Assessment & Plan:  Patient reports Dr. Keenan has been monitoring these cataracts for awhile and she is unsure about severity; she wants to consider second opinion        FOLLOW-UP  RTC 1yr for annual wellness; fasting labs the week prior to appt (CBC, CMP, TSH, lipids, UA/micro, microalb)      Electronically signed by:    Gabby Kebede MD, FACP  09/27/2022

## 2022-09-27 NOTE — PROGRESS NOTES
Immunization  Immunization performed in left deltoid IM by Radha Judge MA. Patient tolerated the procedure well without complications.  09/27/22   Radha Judge MA

## 2022-09-27 NOTE — ASSESSMENT & PLAN NOTE
Health maintenance - COVID19 vacc done, incl 4th dose booster; rec flu vaccine and proceeding with new COVID19 vacc (BOTH GIVEN TODAY); last flu vacc 9/21; Prevnar 7/15, PVX 5/11, Tdap 9/19 (next Td due 2029); Zostavax 2010; HAV and Shingrix done; mammo 12/8/21; no further Paps unless abnlities; DEXA 12/21, repeat 2023-24; colonosc 12/17, repeat 2022 per Dr. Link; eye exam w/ Dr. Keenan 3/22 (monitored for cataracts); dental exam q6 mos, done 6/22; (+) seat belt use    Consultants:  Patient Care Team:  Gabby Kebede MD as PCP - General  Gabby Kebede MD as PCP - Internal Medicine  Casey Link MD as Consulting Physician (Gastroenterology)  Anuel Diaz MD as Consulting Physician (Orthopedic Surgery)  Matthew Vanegas MD as Consulting Physician (Orthopedic Surgery)  FLOR Ying Jr., MD as Consulting Physician (Ophthalmology)  Melia Keenan MD as Consulting Physician (Ophthalmology)  Raghavendra Coburn MD as Consulting Physician (Otolaryngology)  John Staton MD as Consulting Physician (Cardiology)  Nestor Mcdaniels MD as Consulting Physician (Pediatric Allergy and Immunology)  Delfina Gill APRN as Nurse Practitioner (Dermatology)  Reyes Farrar MD as Consulting Physician (Dermatology)

## 2022-09-27 NOTE — ASSESSMENT & PLAN NOTE
Lipids stable with ; no meds; decrease saturated fats and cholesterol in the diet; repeat lipids in 12 mos

## 2022-09-27 NOTE — ASSESSMENT & PLAN NOTE
Agree with avoidance of foods that trigger GI symptoms or urticaria/hives; discussed limitations of food allergy testing

## 2022-09-27 NOTE — ASSESSMENT & PLAN NOTE
"NEW dx; Discussed that the term \"histamine intolerance\" is confusing because everybody has reaction to histamine, as that is the control use for allergy testing; discussed that she could have pruritus due to urticaria which is in the allergy spectrum; discussed the end of the spectrum would be muscle activation syndrome, which patient does not have; discussed avoidance of foods that she has found to cause hives/itching versus GI upset; recommend 30-day trial of daily antihistamine to decrease episodes of itching and hives that awaken her from sleep  "

## 2022-12-09 ENCOUNTER — HOSPITAL ENCOUNTER (OUTPATIENT)
Dept: MAMMOGRAPHY | Facility: HOSPITAL | Age: 77
Discharge: HOME | End: 2022-12-09
Admitting: INTERNAL MEDICINE

## 2022-12-09 DIAGNOSIS — Z12.31 VISIT FOR SCREENING MAMMOGRAM: ICD-10-CM

## 2022-12-09 PROCEDURE — 77067 SCR MAMMO BI INCL CAD: CPT

## 2022-12-09 PROCEDURE — 77063 BREAST TOMOSYNTHESIS BI: CPT

## 2022-12-09 PROCEDURE — 77063 BREAST TOMOSYNTHESIS BI: CPT | Performed by: RADIOLOGY

## 2022-12-09 PROCEDURE — 77067 SCR MAMMO BI INCL CAD: CPT | Performed by: RADIOLOGY

## 2022-12-19 ENCOUNTER — TRANSCRIBE ORDERS (OUTPATIENT)
Dept: ADMINISTRATIVE | Facility: HOSPITAL | Age: 77
End: 2022-12-19

## 2022-12-19 DIAGNOSIS — Z12.31 VISIT FOR SCREENING MAMMOGRAM: Primary | ICD-10-CM

## 2023-01-03 RX ORDER — SODIUM, POTASSIUM,MAG SULFATES 17.5-3.13G
SOLUTION, RECONSTITUTED, ORAL ORAL
Qty: 354 ML | Refills: 0 | Status: SHIPPED | OUTPATIENT
Start: 2023-01-03

## 2023-01-19 PROBLEM — K57.30 DIVERTICULOSIS OF COLON: Status: ACTIVE | Noted: 2023-01-19

## 2023-03-26 PROBLEM — H61.23 BILATERAL IMPACTED CERUMEN: Status: ACTIVE | Noted: 2023-03-26

## 2023-04-11 NOTE — PROGRESS NOTES
[facility] Cardiology    Patient ID: Brian Pablo is a 77 y.o. female.  : 1945   Contact: [phone]    Encounter date: 2023    PCP: Gabby Kebede MD      Chief complaint:   Chief Complaint   Patient presents with   • Palpitations       Problem List:  1. Palpitations:  a. Holter monitor, 2018: Demonstrated sinus rhythm, 8 single VEs, 135 single SVEs, 10 paired.  Symptoms consistent with PACs and PVCs which are less than 1%  b. Residual class I symptoms with acceptable EKG  c. Echocardiogram, 2018: EF 0.68. Mild-to-moderate TR. Left ventricular diastolic dysfunction is abnormal consistent with: age. No evidence of pericardial effusion. Mild pulmonary hypertension.   d. Residual class I symptoms, 2019, 2019  e. Acceptable negative quantitative SPECT gated Cardiolite exercise stress test without anginal type chest discomfort, ischemic electrocardiographic changes, or myocardial perfusion abnormalities suggestive of low probability for significant focal obstructive coronary artery disease with preserved systolic left ventricular function (LVEF 0.74) following exercise to 91% predicted maximum heart rate and 158% predicted exercise capacity, May 2021  f. Event monitor in April/May 2021: Occasional PACs, 18 episodes of SVT, occasional PVCs, no ventricular tachycardia, no AV block  g. Residual class I symptoms 2021, 2022  2. Hyperlipidemia; ASCVD 10-year risk 14.7%, 10.8% if treated  3. Positional vertigo  4. Osteoarthritis  5. Surgical history:   a. Colonoscopy with polyp removal  b. C5-C6 fusion  c. Basal cell removal on head, 2022    Allergies   Allergen Reactions   • Sulfamethoxazole GI Intolerance       Current Medications:    Current Outpatient Medications:   •  acebutolol (SECTRAL) 200 MG capsule, TAKE 1 CAPSULE BY MOUTH TWICE A DAY, Disp: 180 capsule, Rfl: 3  •  calcium carbonate-vitamin d 600-400 MG-UNIT per tablet, Take  by  Assesment/Plan:  76 y/o female with Severe Prosthetic Mitral Valve Stenosis, Acute on Chronic HFpEF, pAFlutter     1.) Prosthetic MV Stenosis: CT was reviewed by Yogi, and there is Low risk of LVOT obstruction with Parth procedure. She is scheduled for transcatheter mitral Valve in Valve on Friday 2/3  2.) LE duplex negative for DVT  3.) AFlutter with pAFib documented in outpt cardiology record. On NOAC as outpt, inpt Lovenox on hold for OR tomorrow    JONAS Bernstein NP  [address] mouth Daily. Take as Directed, Disp: , Rfl:   •  conjugated estrogens (PREMARIN) 0.625 MG/GM vaginal cream, Insert  into the vagina 1 (One) Time Per Week. As Directed once a week, Disp: 30 g, Rfl: 1  •  CRANBERRY SOFT PO, Take 4,200 mg by mouth Daily., Disp: , Rfl:   •  ipratropium (ATROVENT) 0.03 % nasal spray, SPRAY 2 SPRAYS BY INTRANASAL ROUTE TWICE A DAY AS NEEDED, Disp: , Rfl:   •  isosorbide mononitrate (IMDUR) 30 MG 24 hr tablet, TAKE 1 TABLET BY MOUTH EVERY DAY, Disp: 90 tablet, Rfl: 3  •  Multiple Vitamins-Minerals (MULTIVITAMIN ADULT PO), Take 1 tablet by mouth Daily., Disp: , Rfl:   •  nitroglycerin (NITROSTAT) 0.4 MG SL tablet, 1 under the tongue as needed for angina, may repeat q5mins for up three doses, Disp: 100 tablet, Rfl: 11    HPI    Brian Pablo is a 77 y.o. female, who is a former Dr. Staton patient, presents today for a follow up of palpitations and cardiac risk factors. Since last visit, the patient has been doing well overall from a cardiovascular standpoint. She states she had been having intermittent chest pain but she increased her exercise which resulted in her feeling better and has had less chest pain. She walks daily, takes a fitness class 2 days a week, and a yoga class 2 days a week. She notes that she will have a random lightheaded episode but believes it could be her blood sugar decreasing. She states that her palpitations have been stable. She questions if they could be related to stress. She is on Imdur due to having a bad chest pain in May 2022. Patient questions if Tylenol and antihistamines/decongestants are safe to take. Patient denies chest pain, shortness of breath, orthopnea, palpitations, edema, dizziness, and syncope.     The following portions of the patient's history were reviewed and updated as appropriate: allergies, current medications and problem list.    Pertinent positives as listed in the HPI.  All other systems reviewed are negative.         Vitals:     "04/12/23 0934   BP: 118/76   BP Location: Left arm   Patient Position: Sitting   Cuff Size: Adult   Pulse: 65   SpO2: 99%   Weight: 61.1 kg (134 lb 12.8 oz)   Height: 154.9 cm (61\")       Physical Exam:  General: Alert and oriented.  Neck: Jugular venous pressure is within normal limits. Carotids have normal upstrokes without bruits.   Cardiovascular: Heart has a nondisplaced focal PMI. Regular rate and rhythm. No murmur, gallop or rub.  Lungs: Clear, no rales or wheezes. Equal expansion is noted.   Extremities: Show no edema.  Skin: Warm and dry.  Neurologic: Nonfocal.     Diagnostic Data (reviewed with patient):  Lab Results   Component Value Date    GLUCOSE 88 09/16/2022    BUN 9 09/16/2022    CREATININE 0.81 09/16/2022    EGFR 74.9 09/16/2022    BCR 11.1 09/16/2022     09/16/2022    K 4.0 09/16/2022     09/16/2022    CO2 25.3 09/16/2022    CALCIUM 9.6 09/16/2022    ALBUMIN 4.20 09/16/2022    ALKPHOS 48 09/16/2022    AST 27 09/16/2022    ALT 14 09/16/2022     Lab Results   Component Value Date    CHOL 209 (H) 09/16/2022    TRIG 62 09/16/2022    HDL 82 (H) 09/16/2022     (H) 09/16/2022      Lab Results   Component Value Date    WBC 4.26 09/16/2022    RBC 4.26 09/16/2022    HGB 12.8 09/16/2022    HCT 37.7 09/16/2022    MCV 88.5 09/16/2022     09/16/2022      Lab Results   Component Value Date    TSH 1.970 09/16/2022        Advance Care Planning   ACP discussion was held with the patient during this visit. Patient has an advance directive (not in EMR), copy requested.       Procedures      Assessment:    ICD-10-CM ICD-9-CM   1. Palpitations  R00.2 785.1   2. Essential hypertension  I10 401.9   3. Dyslipidemia  E78.5 272.4     Patient was advised that Tylenol PRN and antihistamines without decongestants are safe to take.        Plan:  1. Stable cardiac status.   2. Continue on acebutolol 200 mg bid for palpitations.   3. Continue on Imdur 30 mg daily for chest pain. Patient may try to " discontinue. If patient begins to have frequent chest pain, she may reinitiate.   4. Continue all other current medications.  5. F/up in 12 months, sooner if needed.      Scribed for Yara Swann MD by Priyanka De Jesus. 4/12/2023 09:45 EDT         I Yara Swann MD personally performed the services described in this documentation as scribed by the above individual in my presence, and it is both accurate and complete.    Yara Swann MD, FACC

## 2023-04-12 ENCOUNTER — OFFICE VISIT (OUTPATIENT)
Dept: CARDIOLOGY | Facility: CLINIC | Age: 78
End: 2023-04-12
Payer: MEDICARE

## 2023-04-12 VITALS
HEART RATE: 65 BPM | WEIGHT: 134.8 LBS | SYSTOLIC BLOOD PRESSURE: 118 MMHG | OXYGEN SATURATION: 99 % | BODY MASS INDEX: 25.45 KG/M2 | DIASTOLIC BLOOD PRESSURE: 76 MMHG | HEIGHT: 61 IN

## 2023-04-12 DIAGNOSIS — E78.5 DYSLIPIDEMIA: ICD-10-CM

## 2023-04-12 DIAGNOSIS — R00.2 PALPITATIONS: Primary | ICD-10-CM

## 2023-04-12 DIAGNOSIS — I10 ESSENTIAL HYPERTENSION: ICD-10-CM

## 2023-04-12 PROCEDURE — 1159F MED LIST DOCD IN RCRD: CPT | Performed by: INTERNAL MEDICINE

## 2023-04-12 PROCEDURE — 99213 OFFICE O/P EST LOW 20 MIN: CPT | Performed by: INTERNAL MEDICINE

## 2023-04-12 PROCEDURE — 1160F RVW MEDS BY RX/DR IN RCRD: CPT | Performed by: INTERNAL MEDICINE

## 2023-04-12 PROCEDURE — 3074F SYST BP LT 130 MM HG: CPT | Performed by: INTERNAL MEDICINE

## 2023-04-12 PROCEDURE — 3078F DIAST BP <80 MM HG: CPT | Performed by: INTERNAL MEDICINE

## 2023-04-17 RX ORDER — ISOSORBIDE MONONITRATE 30 MG/1
TABLET, EXTENDED RELEASE ORAL
Qty: 90 TABLET | Refills: 1 | Status: SHIPPED | OUTPATIENT
Start: 2023-04-17

## 2023-04-17 RX ORDER — ACEBUTOLOL HYDROCHLORIDE 200 MG/1
CAPSULE ORAL
Qty: 180 CAPSULE | Refills: 1 | Status: SHIPPED | OUTPATIENT
Start: 2023-04-17

## 2023-10-11 RX ORDER — ACEBUTOLOL HYDROCHLORIDE 200 MG/1
CAPSULE ORAL
Qty: 180 CAPSULE | Refills: 3 | Status: SHIPPED | OUTPATIENT
Start: 2023-10-11

## 2023-10-16 ENCOUNTER — PATIENT MESSAGE (OUTPATIENT)
Dept: INTERNAL MEDICINE | Facility: CLINIC | Age: 78
End: 2023-10-16
Payer: MEDICARE

## 2023-10-16 DIAGNOSIS — E78.00 PURE HYPERCHOLESTEROLEMIA: Chronic | ICD-10-CM

## 2023-10-16 DIAGNOSIS — Z00.00 MEDICARE ANNUAL WELLNESS VISIT, SUBSEQUENT: Primary | Chronic | ICD-10-CM

## 2023-10-16 DIAGNOSIS — I10 ESSENTIAL HYPERTENSION: Chronic | ICD-10-CM

## 2023-10-20 ENCOUNTER — LAB (OUTPATIENT)
Dept: LAB | Facility: HOSPITAL | Age: 78
End: 2023-10-20
Payer: MEDICARE

## 2023-10-20 DIAGNOSIS — I10 ESSENTIAL HYPERTENSION: ICD-10-CM

## 2023-10-20 DIAGNOSIS — E78.00 PURE HYPERCHOLESTEROLEMIA: Chronic | ICD-10-CM

## 2023-10-20 DIAGNOSIS — Z00.00 MEDICARE ANNUAL WELLNESS VISIT, SUBSEQUENT: ICD-10-CM

## 2023-10-20 LAB
ALBUMIN SERPL-MCNC: 4.4 G/DL (ref 3.5–5.2)
ALBUMIN UR-MCNC: <1.2 MG/DL
ALBUMIN/GLOB SERPL: 1.7 G/DL
ALP SERPL-CCNC: 54 U/L (ref 39–117)
ALT SERPL W P-5'-P-CCNC: 17 U/L (ref 1–33)
ANION GAP SERPL CALCULATED.3IONS-SCNC: 8 MMOL/L (ref 5–15)
AST SERPL-CCNC: 28 U/L (ref 1–32)
BACTERIA UR QL AUTO: ABNORMAL /HPF
BASOPHILS # BLD AUTO: 0.04 10*3/MM3 (ref 0–0.2)
BASOPHILS NFR BLD AUTO: 0.9 % (ref 0–1.5)
BILIRUB SERPL-MCNC: 0.9 MG/DL (ref 0–1.2)
BILIRUB UR QL STRIP: NEGATIVE
BUN SERPL-MCNC: 11 MG/DL (ref 8–23)
BUN/CREAT SERPL: 13.1 (ref 7–25)
CALCIUM SPEC-SCNC: 9.9 MG/DL (ref 8.6–10.5)
CHLORIDE SERPL-SCNC: 99 MMOL/L (ref 98–107)
CHOLEST SERPL-MCNC: 173 MG/DL (ref 0–200)
CLARITY UR: CLEAR
CO2 SERPL-SCNC: 27 MMOL/L (ref 22–29)
COLOR UR: YELLOW
CREAT SERPL-MCNC: 0.84 MG/DL (ref 0.57–1)
CREAT UR-MCNC: 60.3 MG/DL
DEPRECATED RDW RBC AUTO: 41 FL (ref 37–54)
EGFRCR SERPLBLD CKD-EPI 2021: 70.8 ML/MIN/1.73
EOSINOPHIL # BLD AUTO: 0.12 10*3/MM3 (ref 0–0.4)
EOSINOPHIL NFR BLD AUTO: 2.6 % (ref 0.3–6.2)
ERYTHROCYTE [DISTWIDTH] IN BLOOD BY AUTOMATED COUNT: 12.7 % (ref 12.3–15.4)
GLOBULIN UR ELPH-MCNC: 2.6 GM/DL
GLUCOSE SERPL-MCNC: 95 MG/DL (ref 65–99)
GLUCOSE UR STRIP-MCNC: NEGATIVE MG/DL
HCT VFR BLD AUTO: 37.8 % (ref 34–46.6)
HDLC SERPL-MCNC: 69 MG/DL (ref 40–60)
HGB BLD-MCNC: 12.8 G/DL (ref 12–15.9)
HGB UR QL STRIP.AUTO: NEGATIVE
HYALINE CASTS UR QL AUTO: ABNORMAL /LPF
IMM GRANULOCYTES # BLD AUTO: 0.01 10*3/MM3 (ref 0–0.05)
IMM GRANULOCYTES NFR BLD AUTO: 0.2 % (ref 0–0.5)
KETONES UR QL STRIP: NEGATIVE
LDLC SERPL CALC-MCNC: 94 MG/DL (ref 0–100)
LDLC/HDLC SERPL: 1.36 {RATIO}
LEUKOCYTE ESTERASE UR QL STRIP.AUTO: NEGATIVE
LYMPHOCYTES # BLD AUTO: 1.36 10*3/MM3 (ref 0.7–3.1)
LYMPHOCYTES NFR BLD AUTO: 30 % (ref 19.6–45.3)
MCH RBC QN AUTO: 30 PG (ref 26.6–33)
MCHC RBC AUTO-ENTMCNC: 33.9 G/DL (ref 31.5–35.7)
MCV RBC AUTO: 88.7 FL (ref 79–97)
MICROALBUMIN/CREAT UR: NORMAL MG/G{CREAT}
MONOCYTES # BLD AUTO: 0.6 10*3/MM3 (ref 0.1–0.9)
MONOCYTES NFR BLD AUTO: 13.2 % (ref 5–12)
NEUTROPHILS NFR BLD AUTO: 2.4 10*3/MM3 (ref 1.7–7)
NEUTROPHILS NFR BLD AUTO: 53.1 % (ref 42.7–76)
NITRITE UR QL STRIP: NEGATIVE
NRBC BLD AUTO-RTO: 0 /100 WBC (ref 0–0.2)
PH UR STRIP.AUTO: 8 [PH] (ref 5–8)
PLATELET # BLD AUTO: 233 10*3/MM3 (ref 140–450)
PMV BLD AUTO: 9.5 FL (ref 6–12)
POTASSIUM SERPL-SCNC: 4.3 MMOL/L (ref 3.5–5.2)
PROT SERPL-MCNC: 7 G/DL (ref 6–8.5)
PROT UR QL STRIP: NEGATIVE
RBC # BLD AUTO: 4.26 10*6/MM3 (ref 3.77–5.28)
RBC # UR STRIP: ABNORMAL /HPF
REF LAB TEST METHOD: ABNORMAL
SODIUM SERPL-SCNC: 134 MMOL/L (ref 136–145)
SP GR UR STRIP: 1.01 (ref 1–1.03)
SQUAMOUS #/AREA URNS HPF: ABNORMAL /HPF
TRIGL SERPL-MCNC: 51 MG/DL (ref 0–150)
TSH SERPL DL<=0.05 MIU/L-ACNC: 2.72 UIU/ML (ref 0.27–4.2)
UROBILINOGEN UR QL STRIP: NORMAL
VLDLC SERPL-MCNC: 10 MG/DL (ref 5–40)
WBC # UR STRIP: ABNORMAL /HPF
WBC NRBC COR # BLD: 4.53 10*3/MM3 (ref 3.4–10.8)

## 2023-10-20 PROCEDURE — 82043 UR ALBUMIN QUANTITATIVE: CPT

## 2023-10-20 PROCEDURE — 85025 COMPLETE CBC W/AUTO DIFF WBC: CPT

## 2023-10-20 PROCEDURE — 80053 COMPREHEN METABOLIC PANEL: CPT

## 2023-10-20 PROCEDURE — 82570 ASSAY OF URINE CREATININE: CPT

## 2023-10-20 PROCEDURE — 84443 ASSAY THYROID STIM HORMONE: CPT

## 2023-10-20 PROCEDURE — 80061 LIPID PANEL: CPT

## 2023-10-20 PROCEDURE — 81001 URINALYSIS AUTO W/SCOPE: CPT

## 2023-10-23 PROBLEM — R73.01 IMPAIRED FASTING GLUCOSE: Status: ACTIVE | Noted: 2023-10-23

## 2023-10-24 NOTE — PROGRESS NOTES
"Chief Complaint   Patient presents with    Medicare Wellness-subsequent    Hyperlipidemia    Impaired Fasting Glucose       History of Present Illness  78 y.o.  female presents for updated phys examination and wellness visit as well as f/u on cholesterol, BP, and sugars. Previously BPs were in the 115-120s systolic range.    Reports Dr. Swann took her off of isosorbide, which was originally RX'd by Dr. Staton. Feels better off of it.    Review of Systems  Denies headaches, visual changes, CP, palpitations, SOB, cough, abd pain, n/v/d, difficulty with urination, numbness/tingling, falls, mood changes, lightheadedness, hearing changes, rashes.    Reports recurrent clogged up left ear - sees Dr. Coburn regularly for earwax removal.   All other ROS reviewed and negative.      Current Outpatient Medications:     acebutolol (SECTRAL) 200 MG BID    calcium carbonate-vitamin d 600-400 MG-UNIT QD    conjugated estrogens (PREMARIN) 0.625 MG/GM vaginal weekly    CRANBERRY SOFT QD    ipratropium (ATROVENT) 0.03 % nasal spray QD    Multiple Vitamins-Minerals QD    nitroglycerin (NITROSTAT) 0.4 MG prn    VITALS:  /72   Pulse 76   Ht 154.9 cm (61\")   Wt 63.5 kg (140 lb)   SpO2 98%   BMI 26.45 kg/m²   Repeat /72 - identical, left arm    Physical Exam  Vitals and nursing note reviewed.   Constitutional:       General: She is not in acute distress.     Appearance: Normal appearance. She is well-developed. She is not ill-appearing.   HENT:      Head: Normocephalic.      Right Ear: Ear canal and external ear normal.      Left Ear: Ear canal and external ear normal.      Ears:      Comments: Left auditory canal occluded by cerumen     Nose: Nose normal.   Eyes:      Extraocular Movements: Extraocular movements intact.      Conjunctiva/sclera: Conjunctivae normal.      Pupils: Pupils are equal, round, and reactive to light.      Comments: Wearing glasses   Neck:      Vascular: No carotid bruit " (bilaterally).   Cardiovascular:      Rate and Rhythm: Normal rate and regular rhythm.      Heart sounds: Normal heart sounds.      Comments: BLE spider veins  Pulmonary:      Effort: Pulmonary effort is normal. No respiratory distress.      Breath sounds: Normal breath sounds. No wheezing or rales.   Abdominal:      General: Bowel sounds are normal. There is no distension.      Palpations: Abdomen is soft. There is no mass.      Tenderness: There is no abdominal tenderness.   Genitourinary:     Comments: Chaperone declined by patient.    Breast exam unremarkable without masses, skin changes, nipple discharge, or axillary adenopathy.      Musculoskeletal:      Cervical back: Normal range of motion and neck supple.      Right lower leg: No edema.      Left lower leg: No edema.   Lymphadenopathy:      Cervical: No cervical adenopathy.   Skin:     General: Skin is warm and dry.      Findings: No rash.   Neurological:      Mental Status: She is alert and oriented to person, place, and time.      Gait: Gait normal.   Psychiatric:         Mood and Affect: Mood normal.         Behavior: Behavior normal.         LABS  Results for orders placed or performed in visit on 10/27/23   POC Glycosylated Hemoglobin (Hb A1C)    Specimen: Blood   Result Value Ref Range    Hemoglobin A1C 5.7 4.5 - 5.7 %    Lot Number [id number]     Expiration Date 06/20/25      Results for orders placed or performed in visit on 10/20/23   Comprehensive Metabolic Panel    Specimen: Blood   Result Value Ref Range    Glucose 95 65 - 99 mg/dL    BUN 11 8 - 23 mg/dL    Creatinine 0.84 0.57 - 1.00 mg/dL    Sodium 134 (L) 136 - 145 mmol/L    Potassium 4.3 3.5 - 5.2 mmol/L    Chloride 99 98 - 107 mmol/L    CO2 27.0 22.0 - 29.0 mmol/L    Calcium 9.9 8.6 - 10.5 mg/dL    Total Protein 7.0 6.0 - 8.5 g/dL    Albumin 4.4 3.5 - 5.2 g/dL    ALT (SGPT) 17 1 - 33 U/L    AST (SGOT) 28 1 - 32 U/L    Alkaline Phosphatase 54 39 - 117 U/L    Total Bilirubin 0.9 0.0 - 1.2  mg/dL    Globulin 2.6 gm/dL    A/G Ratio 1.7 g/dL    BUN/Creatinine Ratio 13.1 7.0 - 25.0    Anion Gap 8.0 5.0 - 15.0 mmol/L    eGFR 70.8 >60.0 mL/min/1.73   Lipid Panel    Specimen: Blood   Result Value Ref Range    Total Cholesterol 173 0 - 200 mg/dL    Triglycerides 51 0 - 150 mg/dL    HDL Cholesterol 69 (H) 40 - 60 mg/dL    LDL Cholesterol  94 0 - 100 mg/dL    VLDL Cholesterol 10 5 - 40 mg/dL    LDL/HDL Ratio 1.36    TSH    Specimen: Blood   Result Value Ref Range    TSH 2.720 0.270 - 4.200 uIU/mL   Microalbumin / Creatinine Urine Ratio - Urine, Clean Catch    Specimen: Urine, Clean Catch   Result Value Ref Range    Microalbumin/Creatinine Ratio      Creatinine, Urine 60.3 mg/dL    Microalbumin, Urine <1.2 mg/dL   CBC Auto Differential    Specimen: Blood   Result Value Ref Range    WBC 4.53 3.40 - 10.80 10*3/mm3    RBC 4.26 3.77 - 5.28 10*6/mm3    Hemoglobin 12.8 12.0 - 15.9 g/dL    Hematocrit 37.8 34.0 - 46.6 %    MCV 88.7 79.0 - 97.0 fL    MCH 30.0 26.6 - 33.0 pg    MCHC 33.9 31.5 - 35.7 g/dL    RDW 12.7 12.3 - 15.4 %    RDW-SD 41.0 37.0 - 54.0 fl    MPV 9.5 6.0 - 12.0 fL    Platelets 233 140 - 450 10*3/mm3    Neutrophil % 53.1 42.7 - 76.0 %    Lymphocyte % 30.0 19.6 - 45.3 %    Monocyte % 13.2 (H) 5.0 - 12.0 %    Eosinophil % 2.6 0.3 - 6.2 %    Basophil % 0.9 0.0 - 1.5 %    Immature Grans % 0.2 0.0 - 0.5 %    Neutrophils, Absolute 2.40 1.70 - 7.00 10*3/mm3    Lymphocytes, Absolute 1.36 0.70 - 3.10 10*3/mm3    Monocytes, Absolute 0.60 0.10 - 0.90 10*3/mm3    Eosinophils, Absolute 0.12 0.00 - 0.40 10*3/mm3    Basophils, Absolute 0.04 0.00 - 0.20 10*3/mm3    Immature Grans, Absolute 0.01 0.00 - 0.05 10*3/mm3    nRBC 0.0 0.0 - 0.2 /100 WBC   Urinalysis without microscopic (no culture) - Urine, Clean Catch    Specimen: Urine, Clean Catch   Result Value Ref Range    Color, UA Yellow Yellow, Straw    Appearance, UA Clear Clear    pH, UA 8.0 5.0 - 8.0    Specific Gravity, UA 1.011 1.005 - 1.030    Glucose, UA  Negative Negative    Ketones, UA Negative Negative    Bilirubin, UA Negative Negative    Blood, UA Negative Negative    Protein, UA Negative Negative    Leuk Esterase, UA Negative Negative    Nitrite, UA Negative Negative    Urobilinogen, UA 0.2 E.U./dL 0.2 - 1.0 E.U./dL   Urinalysis, Microscopic Only - Urine, Clean Catch    Specimen: Urine, Clean Catch   Result Value Ref Range    RBC, UA None Seen None Seen, 0-2 /HPF    WBC, UA 0-2 None Seen, 0-2 /HPF    Bacteria, UA None Seen None Seen /HPF    Squamous Epithelial Cells, UA 3-6 (A) None Seen, 0-2 /HPF    Hyaline Casts, UA None Seen None Seen /LPF    Methodology Manual Light Microscopy      1/17/23 A1C 5.7      ECG 12 Lead    Date/Time: 10/27/2023 9:39 AM  Performed by: Gabby Kebede MD    Authorized by: Gabby Kebede MD  Comparison: compared with previous ECG from 9/27/2022  Similar to previous ECG  Rhythm: sinus rhythm  Rate: normal  BPM: 61  Conduction: conduction normal  ST Segments: ST segments normal  T Waves: T waves normal  QRS axis: left  Clinical impression comment: stable EKG            ASSESSMENT/PLAN    Diagnoses and all orders for this visit:    1. Medicare annual wellness visit, subsequent (Primary)  Assessment & Plan:  Health maintenance - COVID19 vacc and flu vacc done; rec RSV vacc - get at pharmacy; Prevnar 7/15, PVX 5/11, Tdap 9/19 (next Td due 2029); HAV and Shingrix done; mammo 12/9/22, next pending 12/11/23; no further Paps unless abnlities; DEXA 12/21, repeat 2023-24; colonosc 1/23, no further per Dr. Link; eye exam w/ Dr. Keenan, next pending 3/24 per pt; dental exam q6 mos; (+) seat belt use    Consultants:  Patient Care Team:  Gabby Kebede MD as PCP - General  Gabby Kebede MD as PCP - Internal Medicine  Casey Link MD as Consulting Physician (Gastroenterology)  Anuel Diaz MD as Consulting Physician (Orthopedic Surgery)  Matthew Vanegas MD as Consulting Physician (Orthopedic Surgery)  FLOR Ying  Deric Caldwell MD as Consulting Physician (Ophthalmology)  Melia Keenan MD as Consulting Physician (Ophthalmology)  Raghavendra Coburn MD as Consulting Physician (Otolaryngology)  Nestor Mcdaniels MD as Consulting Physician (Pediatric Allergy and Immunology)  Delfina Gill APRN as Nurse Practitioner (Dermatology)  Reyes Farrar MD as Consulting Physician (Dermatology)        2. Hyperlipidemia  Assessment & Plan:  Lipids stable with LDL 94; no meds; goal LDL < 130, ideally < 100    Orders:  -     ECG 12 Lead    3. Essential hypertension  Assessment & Plan:  BP mildly elevated today, unchanged on repeat; she reports normotensive readings at home - rec checking 2-3x/week with goal < 130/80; no meds except acebutolol 200mg BID fo SVT      4. Impaired fasting glucose  Assessment & Plan:  BG control stable with A1C 5.7; encouraged reg phys activity to decr insulin resistance, moderation in unhealthy starches/sweets; f/u A1C in 6 mos      Orders:  -     POC Glycosylated Hemoglobin (Hb A1C)    5. Impacted cerumen, left ear  Comments:  she will get cerumenectomy at Dr. Coburn's office        FOLLOW-UP  RTC 6 mos with A1C and BP check; earlier f/u if Bps consistently > 140/90    Electronically signed by:    Gabby Kebede MD, FACP  10/27/2023

## 2023-10-24 NOTE — ASSESSMENT & PLAN NOTE
Health maintenance - COVID19 vacc and flu vacc done; rec RSV vacc - get at pharmacy; Prevnar 7/15, PVX 5/11, Tdap 9/19 (next Td due 2029); HAV and Shingrix done; mammo 12/9/22, next pending 12/11/23; no further Paps unless abnlities; DEXA 12/21, repeat 2023-24; colonosc 1/23, no further per Dr. Link; eye exam w/ Dr. Keenan, next pending 3/24 per pt; dental exam q6 mos; (+) seat belt use    Consultants:  Patient Care Team:  Gabby Kebede MD as PCP - General  Gabby Kebede MD as PCP - Internal Medicine  Casey Link MD as Consulting Physician (Gastroenterology)  Anuel Diaz MD as Consulting Physician (Orthopedic Surgery)  Matthew Vanegas MD as Consulting Physician (Orthopedic Surgery)  FLOR Ying Jr., MD as Consulting Physician (Ophthalmology)  Melia Keenan MD as Consulting Physician (Ophthalmology)  Raghavendra Coburn MD as Consulting Physician (Otolaryngology)  Nestor Mcdaniels MD as Consulting Physician (Pediatric Allergy and Immunology)  Delfina Gill APRN as Nurse Practitioner (Dermatology)  Reyes Farrar MD as Consulting Physician (Dermatology)

## 2023-10-24 NOTE — ASSESSMENT & PLAN NOTE
BP mildly elevated today, unchanged on repeat; she reports normotensive readings at home - rec checking 2-3x/week with goal < 130/80; no meds except acebutolol 200mg BID fo SVT

## 2023-10-27 ENCOUNTER — OFFICE VISIT (OUTPATIENT)
Dept: INTERNAL MEDICINE | Facility: CLINIC | Age: 78
End: 2023-10-27
Payer: MEDICARE

## 2023-10-27 VITALS
SYSTOLIC BLOOD PRESSURE: 138 MMHG | DIASTOLIC BLOOD PRESSURE: 72 MMHG | WEIGHT: 140 LBS | BODY MASS INDEX: 26.43 KG/M2 | HEART RATE: 76 BPM | HEIGHT: 61 IN | OXYGEN SATURATION: 98 %

## 2023-10-27 DIAGNOSIS — R73.01 IMPAIRED FASTING GLUCOSE: ICD-10-CM

## 2023-10-27 DIAGNOSIS — H61.22 IMPACTED CERUMEN, LEFT EAR: ICD-10-CM

## 2023-10-27 DIAGNOSIS — I10 ESSENTIAL HYPERTENSION: Chronic | ICD-10-CM

## 2023-10-27 DIAGNOSIS — Z00.00 MEDICARE ANNUAL WELLNESS VISIT, SUBSEQUENT: Primary | Chronic | ICD-10-CM

## 2023-10-27 DIAGNOSIS — E78.00 PURE HYPERCHOLESTEROLEMIA: Chronic | ICD-10-CM

## 2023-10-27 LAB
EXPIRATION DATE: NORMAL
HBA1C MFR BLD: 5.7 % (ref 4.5–5.7)
Lab: NORMAL

## 2023-10-27 NOTE — PROGRESS NOTES
ANNUAL WELLNESS VISIT    DRUG AND ALCOHOL USE      no alcohol use, no tobacco use, and no caffeine use    DIET AND PHYSICAL ACTIVITY     Diet: general    Exercise: daily   Exercise Details: walking    MOOD DISORDER AND COGNITIVE SCREENING     PHQ-2 Depression Screening - components reviewed with patient; screening is negative for active depression.    Little interest or pleasure in doing things? 0-->not at all   Feeling down, depressed, or hopeless? 0-->not at all   PHQ-2 Total Score 0      Anxiety Screening Tool Used YES     AUDIT screening  0     Mini-Cog Performed   Yes    1. Tell Patient 3 Words Car table house    2. Administer Clock Test normal    3. Recall 3 words  Car table house    4. Number Correct Items 3    FUNCTIONAL ABILITY AND LEVEL OF SAFETY   Hearing no hearing loss     Wears Hearing Aids No       Current Activities Independent      none  - see Funct/Cog Status Intake     Fall Risk Assessment       Has difficulty with walking or balance  No         Timed Up and Go (TUG) Test  7 sec.       If >12 sec, normal    ADVANCED DIRECTIVE  Advance Care Planning   ACP discussion was held with the patient during this visit. Patient has an advance directive in EMR which is still valid.   Advance Care Planning Discussion:  16 min or more spent on counseling; patient has advanced directive and living will.  Reviewed desires for end of life care, which is to have comfort care.  Patient does not want extraordinary life-sustaining measures, including no prolonged artificial life support. Encouraged patient to ensure family is aware of desires/preferences. Confirmed son Walter is her healthcare surrogate.    PAIN SCREENING Do you have pain right now? no      If so, 1-10 scale: 0     Intermittent     Do you have pain every day? No      Probable chronic pain: No     Recent Hospitalizations:  No recent hospitalization(s)..     MEDICATION REVIEW   - updated and reviewed (see Medication List).   - reviewed for potentially  "harmful drug-disease interactions in the elderly.   - reviewed for high risk medications in the elderly.   - aspirin use: No    BMI  Body mass index is 26.45 kg/m².  BMI is >= 25 and <30. (Overweight) The following options were offered after discussion;: exercise counseling/recommendations and nutrition counseling/recommendations       _________________________________________________________    Chief Complaint   Patient presents with    Medicare Wellness-subsequent    Hyperlipidemia    Impaired Fasting Glucose       History of Present Illness  78 y.o.  female presents for updated phys examination and wellness visit as well as f/u on cholesterol, BP, and sugars. Previously BPs were in the 115-120s systolic range.     Reports Dr. Swann took her off of isosorbide, which was originally RX'd by Dr. Staton. Feels better off of it.    Review of Systems  Denies headaches, visual changes, CP, palpitations, SOB, cough, abd pain, n/v/d, difficulty with urination, numbness/tingling, falls, mood changes, lightheadedness, hearing changes, rashes.     Reports recurrent clogged up left ear - sees Dr. Coburn regularly for earwax removal.   All other ROS reviewed and negative.    Current Outpatient Medications:     acebutolol (SECTRAL) 200 MG BID    calcium carbonate-vitamin d 600-400 MG-UNIT QD    conjugated estrogens (PREMARIN) 0.625 MG/GM vaginal weekly    CRANBERRY SOFT QD    ipratropium (ATROVENT) 0.03 % nasal spray QD    Multiple Vitamins-Minerals QD    nitroglycerin (NITROSTAT) 0.4 MG prn    VITALS:  /72   Pulse 76   Ht 154.9 cm (61\")   Wt 63.5 kg (140 lb)   SpO2 98%   BMI 26.45 kg/m²     Physical Exam  Vitals and nursing note reviewed.   Constitutional:       General: She is not in acute distress.     Appearance: Normal appearance. She is well-developed. She is not ill-appearing.   HENT:      Head: Normocephalic.      Right Ear: Ear canal and external ear normal. There is no impacted cerumen. "      Left Ear: Ear canal and external ear normal. There is impacted cerumen.      Nose: Nose normal.   Eyes:      Extraocular Movements: Extraocular movements intact.      Conjunctiva/sclera: Conjunctivae normal.      Pupils: Pupils are equal, round, and reactive to light.      Comments: Wearing glasses   Neck:      Vascular: No carotid bruit (bilaterally).   Cardiovascular:      Rate and Rhythm: Normal rate and regular rhythm.      Heart sounds: Normal heart sounds.      Comments: Spider veins BLE  Pulmonary:      Effort: Pulmonary effort is normal. No respiratory distress.      Breath sounds: Normal breath sounds.   Abdominal:      General: Bowel sounds are normal. There is no distension.      Palpations: Abdomen is soft. There is no mass.      Tenderness: There is no abdominal tenderness.   Genitourinary:     Comments: Chaperone declined by patient.    Breast exam unremarkable without masses, skin changes, nipple discharge, or axillary adenopathy.      Musculoskeletal:      Cervical back: Normal range of motion and neck supple.      Right lower leg: No edema.      Left lower leg: No edema.   Lymphadenopathy:      Cervical: No cervical adenopathy.   Skin:     General: Skin is warm and dry.      Findings: No rash. Erythema: .procdoc.  Neurological:      Mental Status: She is alert and oriented to person, place, and time.      Gait: Gait normal.   Psychiatric:         Mood and Affect: Mood normal.         Behavior: Behavior normal.         LABS  Results for orders placed or performed in visit on 10/27/23   POC Glycosylated Hemoglobin (Hb A1C)    Specimen: Blood   Result Value Ref Range    Hemoglobin A1C 5.7 4.5 - 5.7 %    Lot Number [id number]     Expiration Date 06/20/25      Results for orders placed or performed in visit on 10/20/23   Comprehensive Metabolic Panel     Specimen: Blood   Result Value Ref Range     Glucose 95 65 - 99 mg/dL     BUN 11 8 - 23 mg/dL     Creatinine 0.84 0.57 - 1.00 mg/dL     Sodium 134  (L) 136 - 145 mmol/L     Potassium 4.3 3.5 - 5.2 mmol/L     Chloride 99 98 - 107 mmol/L     CO2 27.0 22.0 - 29.0 mmol/L     Calcium 9.9 8.6 - 10.5 mg/dL     Total Protein 7.0 6.0 - 8.5 g/dL     Albumin 4.4 3.5 - 5.2 g/dL     ALT (SGPT) 17 1 - 33 U/L     AST (SGOT) 28 1 - 32 U/L     Alkaline Phosphatase 54 39 - 117 U/L     Total Bilirubin 0.9 0.0 - 1.2 mg/dL     Globulin 2.6 gm/dL     A/G Ratio 1.7 g/dL     BUN/Creatinine Ratio 13.1 7.0 - 25.0     Anion Gap 8.0 5.0 - 15.0 mmol/L     eGFR 70.8 >60.0 mL/min/1.73   Lipid Panel     Specimen: Blood   Result Value Ref Range     Total Cholesterol 173 0 - 200 mg/dL     Triglycerides 51 0 - 150 mg/dL     HDL Cholesterol 69 (H) 40 - 60 mg/dL     LDL Cholesterol  94 0 - 100 mg/dL     VLDL Cholesterol 10 5 - 40 mg/dL     LDL/HDL Ratio 1.36     TSH     Specimen: Blood   Result Value Ref Range     TSH 2.720 0.270 - 4.200 uIU/mL   Microalbumin / Creatinine Urine Ratio - Urine, Clean Catch     Specimen: Urine, Clean Catch   Result Value Ref Range     Microalbumin/Creatinine Ratio         Creatinine, Urine 60.3 mg/dL     Microalbumin, Urine <1.2 mg/dL   CBC Auto Differential     Specimen: Blood   Result Value Ref Range     WBC 4.53 3.40 - 10.80 10*3/mm3     RBC 4.26 3.77 - 5.28 10*6/mm3     Hemoglobin 12.8 12.0 - 15.9 g/dL     Hematocrit 37.8 34.0 - 46.6 %     MCV 88.7 79.0 - 97.0 fL     MCH 30.0 26.6 - 33.0 pg     MCHC 33.9 31.5 - 35.7 g/dL     RDW 12.7 12.3 - 15.4 %     RDW-SD 41.0 37.0 - 54.0 fl     MPV 9.5 6.0 - 12.0 fL     Platelets 233 140 - 450 10*3/mm3     Neutrophil % 53.1 42.7 - 76.0 %     Lymphocyte % 30.0 19.6 - 45.3 %     Monocyte % 13.2 (H) 5.0 - 12.0 %     Eosinophil % 2.6 0.3 - 6.2 %     Basophil % 0.9 0.0 - 1.5 %     Immature Grans % 0.2 0.0 - 0.5 %     Neutrophils, Absolute 2.40 1.70 - 7.00 10*3/mm3     Lymphocytes, Absolute 1.36 0.70 - 3.10 10*3/mm3     Monocytes, Absolute 0.60 0.10 - 0.90 10*3/mm3     Eosinophils, Absolute 0.12 0.00 - 0.40 10*3/mm3     Basophils,  Absolute 0.04 0.00 - 0.20 10*3/mm3     Immature Grans, Absolute 0.01 0.00 - 0.05 10*3/mm3     nRBC 0.0 0.0 - 0.2 /100 WBC   Urinalysis without microscopic (no culture) - Urine, Clean Catch     Specimen: Urine, Clean Catch   Result Value Ref Range     Color, UA Yellow Yellow, Straw     Appearance, UA Clear Clear     pH, UA 8.0 5.0 - 8.0     Specific Gravity, UA 1.011 1.005 - 1.030     Glucose, UA Negative Negative     Ketones, UA Negative Negative     Bilirubin, UA Negative Negative     Blood, UA Negative Negative     Protein, UA Negative Negative     Leuk Esterase, UA Negative Negative     Nitrite, UA Negative Negative     Urobilinogen, UA 0.2 E.U./dL 0.2 - 1.0 E.U./dL   Urinalysis, Microscopic Only - Urine, Clean Catch     Specimen: Urine, Clean Catch   Result Value Ref Range     RBC, UA None Seen None Seen, 0-2 /HPF     WBC, UA 0-2 None Seen, 0-2 /HPF     Bacteria, UA None Seen None Seen /HPF     Squamous Epithelial Cells, UA 3-6 (A) None Seen, 0-2 /HPF     Hyaline Casts, UA None Seen None Seen /LPF     Methodology Manual Light Microscopy        1/17/23 A1C 5.7      ECG 12 Lead    Date/Time: 10/27/2023 9:59 AM  Performed by: Gabby Kebede MD    Authorized by: Gabby Kebede MD  Comparison: compared with previous ECG   Similar to previous ECG  Rhythm: sinus rhythm  Rate: normal  BPM: 61  Conduction: conduction normal  ST Segments: ST segments normal  T Waves: T waves normal  QRS axis: left  Clinical impression comment: stable EKG              ASSESSMENT/PLAN    Diagnoses and all orders for this visit:    1. Medicare annual wellness visit, subsequent (Primary)  Assessment & Plan:  Health maintenance - COVID19 vacc and flu vacc done; rec RSV vacc - get at pharmacy; Prevnar 7/15, PVX 5/11, Tdap 9/19 (next Td due 2029); HAV and Shingrix done; mammo 12/9/22, next pending 12/11/23; no further Paps unless abnlities; DEXA 12/21, repeat 2023-24; colonosc 1/23, no further per Dr. Link; eye exam w/ Dr. Keenan, next  pending 3/24 per pt; dental exam q6 mos; (+) seat belt use    Consultants:  Patient Care Team:  Gabby Kebede MD as PCP - General  Gabby Kebede MD as PCP - Internal Medicine  Casey Link MD as Consulting Physician (Gastroenterology)  Anuel Diaz MD as Consulting Physician (Orthopedic Surgery)  Matthew Vanegas MD as Consulting Physician (Orthopedic Surgery)  FLOR Ying Jr., MD as Consulting Physician (Ophthalmology)  Melia Keenan MD as Consulting Physician (Ophthalmology)  Raghavendra Coburn MD as Consulting Physician (Otolaryngology)  Nestor Mcdaniels MD as Consulting Physician (Pediatric Allergy and Immunology)  Delfina Gill APRN as Nurse Practitioner (Dermatology)  Reyes Farrar MD as Consulting Physician (Dermatology)        2. Hyperlipidemia  Assessment & Plan:  Lipids stable with LDL 94; no meds; goal LDL < 130, ideally < 100    Orders:  -     ECG 12 Lead    3. Essential hypertension  Assessment & Plan:  BP mildly elevated today, unchanged on repeat; she reports normotensive readings at home - rec checking 2-3x/week with goal < 130/80; no meds except acebutolol 200mg BID fo SVT      4. Impaired fasting glucose  Assessment & Plan:  BG control stable with A1C 5.7; encouraged reg phys activity to decr insulin resistance, moderation in unhealthy starches/sweets; f/u A1C in 6 mos      Orders:  -     POC Glycosylated Hemoglobin (Hb A1C)    5. Impacted cerumen, left ear  Comments:  she will get cerumenectomy at Dr. Coburn's office        FOLLOW-UP  RTC 6 mos with A1C and BP check; earlier f/u if BPs consistently > 140/90     Electronically signed by:    Gabby Kebede MD, FACP  10/27/2023

## 2023-11-25 DIAGNOSIS — Z78.0 MENOPAUSE: ICD-10-CM

## 2023-11-26 PROBLEM — R73.01 IMPAIRED FASTING GLUCOSE: Chronic | Status: ACTIVE | Noted: 2023-10-23

## 2023-11-26 RX ORDER — CONJUGATED ESTROGENS 0.62 MG/G
CREAM VAGINAL WEEKLY
Qty: 30 G | Refills: 0 | Status: SHIPPED | OUTPATIENT
Start: 2023-11-26

## 2023-11-26 NOTE — TELEPHONE ENCOUNTER
----- Message from Brian Pablo sent at 2023 12:19 PM EST -----  Regarding: Presccriptionn Renewal  Contact: [phone]  Jade Kebede,  I am needing my prescription for Premarin Vaginal Cream 0.625mg sent to [facility], [facility]. The previous prescription has  so I couldn't renew it online. This can be at your convenience, I still have some to use.  Thank you.  Brian Pablo

## 2023-12-03 PROBLEM — K57.30 DIVERTICULOSIS OF COLON: Chronic | Status: ACTIVE | Noted: 2023-01-19

## 2023-12-11 ENCOUNTER — HOSPITAL ENCOUNTER (OUTPATIENT)
Dept: MAMMOGRAPHY | Facility: HOSPITAL | Age: 78
Discharge: HOME | End: 2023-12-11
Admitting: INTERNAL MEDICINE
Payer: MEDICARE

## 2023-12-11 DIAGNOSIS — Z12.31 VISIT FOR SCREENING MAMMOGRAM: ICD-10-CM

## 2023-12-11 PROCEDURE — 77063 BREAST TOMOSYNTHESIS BI: CPT

## 2023-12-11 PROCEDURE — 77067 SCR MAMMO BI INCL CAD: CPT

## 2023-12-11 PROCEDURE — 77063 BREAST TOMOSYNTHESIS BI: CPT | Performed by: RADIOLOGY

## 2023-12-11 PROCEDURE — 77067 SCR MAMMO BI INCL CAD: CPT | Performed by: RADIOLOGY

## 2023-12-14 ENCOUNTER — TRANSCRIBE ORDERS (OUTPATIENT)
Dept: INTERNAL MEDICINE | Facility: CLINIC | Age: 78
End: 2023-12-14
Payer: MEDICARE

## 2023-12-14 DIAGNOSIS — Z12.31 VISIT FOR SCREENING MAMMOGRAM: Primary | ICD-10-CM

## 2024-04-17 ENCOUNTER — PATIENT ROUNDING (BHMG ONLY) (OUTPATIENT)
Dept: CARDIOLOGY | Facility: CLINIC | Age: 79
End: 2024-04-17
Payer: MEDICARE

## 2024-04-17 ENCOUNTER — OFFICE VISIT (OUTPATIENT)
Dept: CARDIOLOGY | Facility: CLINIC | Age: 79
End: 2024-04-17
Payer: MEDICARE

## 2024-04-17 VITALS
HEART RATE: 62 BPM | DIASTOLIC BLOOD PRESSURE: 70 MMHG | BODY MASS INDEX: 26.32 KG/M2 | SYSTOLIC BLOOD PRESSURE: 130 MMHG | OXYGEN SATURATION: 99 % | HEIGHT: 61 IN | WEIGHT: 139.4 LBS

## 2024-04-17 DIAGNOSIS — I10 ESSENTIAL HYPERTENSION: Chronic | ICD-10-CM

## 2024-04-17 DIAGNOSIS — R00.2 PALPITATIONS: Primary | ICD-10-CM

## 2024-04-17 DIAGNOSIS — E78.5 DYSLIPIDEMIA: ICD-10-CM

## 2024-04-17 PROCEDURE — 99214 OFFICE O/P EST MOD 30 MIN: CPT | Performed by: INTERNAL MEDICINE

## 2024-04-17 PROCEDURE — 1159F MED LIST DOCD IN RCRD: CPT | Performed by: INTERNAL MEDICINE

## 2024-04-17 PROCEDURE — 3078F DIAST BP <80 MM HG: CPT | Performed by: INTERNAL MEDICINE

## 2024-04-17 PROCEDURE — 1160F RVW MEDS BY RX/DR IN RCRD: CPT | Performed by: INTERNAL MEDICINE

## 2024-04-17 PROCEDURE — 3075F SYST BP GE 130 - 139MM HG: CPT | Performed by: INTERNAL MEDICINE

## 2024-04-17 NOTE — PROGRESS NOTES
[facility]    Patient ID: Brian Pablo is a 78 y.o. female.  : 1945   Contact: [phone]    Encounter date: 2024    PCP: Gabby Kebede MD      Chief complaint:   Chief Complaint   Patient presents with    Palpitations       Problem List:  Palpitations:  Holter monitor, 2018: Demonstrated sinus rhythm, 8 single VEs, 135 single SVEs, 10 paired.  Symptoms consistent with PACs and PVCs which are less than 1%  Residual class I symptoms with acceptable EKG  Echocardiogram, 2018: EF 0.68. Mild-to-moderate TR. Left ventricular diastolic dysfunction is abnormal consistent with: age. No evidence of pericardial effusion. Mild pulmonary hypertension.   Residual class I symptoms, 2019, 2019  Acceptable negative quantitative SPECT gated Cardiolite exercise stress test without anginal type chest discomfort, ischemic electrocardiographic changes, or myocardial perfusion abnormalities suggestive of low probability for significant focal obstructive coronary artery disease with preserved systolic left ventricular function (LVEF 0.74) following exercise to 91% predicted maximum heart rate and 158% predicted exercise capacity, May 2021  Event monitor in April/May 2021: Occasional PACs, 18 episodes of SVT, occasional PVCs, no ventricular tachycardia, no AV block  Residual class I symptoms 2021, 2022  Hyperlipidemia; ASCVD 10-year risk 14.7%, 10.8% if treated  Positional vertigo  Osteoarthritis  Surgical history:   Colonoscopy with polyp removal  C5-C6 fusion  Basal cell removal on head, 2022    Allergies   Allergen Reactions    Sulfamethoxazole GI Intolerance       Current Medications:    Current Outpatient Medications:     acebutolol (SECTRAL) 200 MG capsule, TAKE 1 CAPSULE BY MOUTH TWICE A DAY, Disp: 180 capsule, Rfl: 3    calcium carbonate-vitamin d 600-400 MG-UNIT per tablet, Take  by mouth Daily. Take as Directed, Disp: , Rfl:      "CRANBERRY SOFT PO, Take 4,200 mg by mouth Daily., Disp: , Rfl:     Estrogens Conjugated (Premarin) 0.625 MG/GM vaginal cream, Insert  into the vagina 1 (One) Time Per Week. As Directed once a week, Disp: 30 g, Rfl: 0    ipratropium (ATROVENT) 0.03 % nasal spray, SPRAY 2 SPRAYS BY INTRANASAL ROUTE TWICE A DAY AS NEEDED, Disp: , Rfl:     Multiple Vitamins-Minerals (MULTIVITAMIN ADULT PO), Take 1 tablet by mouth Daily., Disp: , Rfl:     HPI    Brian Pablo is a 78 y.o. female who presents today for a 1 year follow up of palpitations and cardiac risk factors. Since last visit, patient has been doing well overall from a cardiovascular standpoint. She notes intermittent left temporal pain. Patient reports it is a sharp pain in left temple that resolves after several seconds but was recently exacerbated while she was ill. She monitors her blood pressure regularly at home and presents with a log that shows a range of 93 to 128 mmHg systolic. Patient notes feeling mildly uncomfortable when walking up elevations and was advised this is likely due to deconditioning. She denies chest pain, shortness of breath, orthopnea, palpitations, edema, dizziness, and syncope.       The following portions of the patient's history were reviewed and updated as appropriate: allergies, current medications and problem list.    Pertinent positives as listed in the HPI.  All other systems reviewed are negative.         Vitals:    04/17/24 0934   BP: 130/70   Pulse: 62   SpO2: 99%   Weight: 63.2 kg (139 lb 6.4 oz)   Height: 154.9 cm (61\")       Physical Exam:  General: Alert and oriented.  Neck: Jugular venous pressure is within normal limits. Carotids have normal upstrokes without bruits.   Cardiovascular: Heart has a nondisplaced focal PMI. Regular rate and rhythm. No murmur, gallop or rub.  Lungs: Clear, no rales or wheezes. Equal expansion is noted.   Extremities: Show no edema.  Skin: Warm and dry.  Neurologic: Nonfocal.     Diagnostic Data " (reviewed with patient):  Lab Results   Component Value Date    GLUCOSE 95 10/20/2023    BUN 11 10/20/2023    CREATININE 0.84 10/20/2023    BCR 13.1 10/20/2023     (L) 10/20/2023    K 4.3 10/20/2023    CL 99 10/20/2023    CO2 27.0 10/20/2023    CALCIUM 9.9 10/20/2023    ALBUMIN 4.4 10/20/2023    ALKPHOS 54 10/20/2023    AST 28 10/20/2023    ALT 17 10/20/2023     Lab Results   Component Value Date    CHOL 173 10/20/2023    TRIG 51 10/20/2023    HDL 69 (H) 10/20/2023    LDL 94 10/20/2023      Lab Results   Component Value Date    WBC 4.53 10/20/2023    RBC 4.26 10/20/2023    HGB 12.8 10/20/2023    HCT 37.8 10/20/2023    MCV 88.7 10/20/2023     10/20/2023      Lab Results   Component Value Date    TSH 2.720 10/20/2023        Advance Care Planning   ACP discussion was held with the patient during this visit. Patient has an advance directive in EMR which is still valid.          Procedures      Assessment:    ICD-10-CM ICD-9-CM   1. Palpitations  R00.2 785.1   2. Essential hypertension  I10 401.9   3. Dyslipidemia  E78.5 272.4         Plan:  Begin routine aerobic exercise for at least 30 minutes 5 days per week.  Continue on Sectral 200 mg BID for rate control.  Continue all other current medications.  F/up in 12 months, sooner if needed.      Scribed for Yara Swann MD by Catarino Patel. 4/17/2024 09:41 EDT    I Yara Swann MD personally performed the services described in this documentation as scribed by the above individual in my presence, and it is both accurate and complete.    Yara Swann MD, FACC

## 2024-04-17 NOTE — PROGRESS NOTES
April 17, 2024    Hello, may I speak with Brian Pablo? Yes.    My name is Caroline PATINO      I am  with [facility]  [address]  [address]  [facility] [address] [phone].    Before we get started may I verify your date of birth? 1945, Yes, correct.    I am calling to officially welcome you to our practice and ask about your recent visit. Is this a good time to talk? Yes.    Tell me about your visit with us. What things went well?  Everything.       We're always looking for ways to make our patients' experiences even better. Do you have recommendations on ways we may improve?  No. The wait time in office from check in to check out, very good.    Overall were you satisfied with your first visit to our practice? Yes.       I appreciate you taking the time to speak with me today. Is there anything else I can do for you? No.      Thank you, and have a great day.

## 2024-04-25 PROBLEM — J30.0 VASOMOTOR RHINITIS: Chronic | Status: ACTIVE | Noted: 2022-08-05

## 2024-04-26 NOTE — ASSESSMENT & PLAN NOTE
BP mildly elevated, still abnl on repeat; cont home monitoring with goal < 130/80; no meds except acebutolol 200mg BID fo SVT; f/u in 6 mos at the latest, earlier if home readings are consistently > 140/90

## 2024-04-26 NOTE — PROGRESS NOTES
"Chief Complaint   Patient presents with    Hypertension    Impaired fasting glucose     History of Present Illness  78 y.o.  female presents for f/u on sugars and BP. Reports increased gas and bloating sensation, comes and goes. Has already cut out milk. Notes also angel \"runs right through me.\"    Reports episodes of left temple sharp pains, fleeting, only lasting for seconds, ongoing for 2 years. Episodes usually occur about once per week, increased only when she was sick the 1st wk of March x 5d. Is not back to about once per week occurrence. No assoc'd visual changes. No other change in frequency or duration. No known triggers. No palpable abnlity in left temple area.    Review of Systems  Denies CP, SOB, falls.  ROS (+) for unchanged fleeting sharp pains in the left temple about once per week without assoc'd visual changes, eye pain, or other assoc'd sxs. ROS (+) for gas pains as noted; no assoc'd n/v. Denies jaw pain or scalp pain. Denies headaches. All other ROS reviewed and negative.      Current Outpatient Medications:     acebutolol (SECTRAL) 200 MG  BID    calcium carbonate-vitamin d 600-400 MG-UNIT QD    CRANBERRY SOFT QD    Estrogens Conjugated (Premarin) 0.625 MG/GM vaginal cream weekly    ipratropium (ATROVENT) 0.03 % nasal spray AD    Multiple Vitamins-Minerals QD    VITALS:  /84   Pulse 62   Temp 96.6 °F (35.9 °C)   Ht 154.9 cm (61\")   Wt 62.5 kg (137 lb 12.8 oz)   SpO2 97%   BMI 26.04 kg/m²   Repeat /72    Physical Exam  Vitals and nursing note reviewed.   Constitutional:       General: She is not in acute distress.     Appearance: Normal appearance. She is not ill-appearing.   HENT:      Head:      Comments: No palpable temporal artery; no skin changes, left temple nontender to palpation  Eyes:      Extraocular Movements: Extraocular movements intact.      Conjunctiva/sclera: Conjunctivae normal.   Pulmonary:      Effort: Pulmonary effort is normal. No respiratory " distress.   Abdominal:      General: Bowel sounds are normal.      Palpations: Abdomen is soft.      Tenderness: There is no abdominal tenderness. There is no guarding.   Neurological:      Mental Status: She is alert. Mental status is at baseline.   Psychiatric:         Mood and Affect: Mood normal.         Behavior: Behavior normal.         LABS  Results for orders placed or performed in visit on 04/29/24   POC Glycosylated Hemoglobin (Hb A1C)    Specimen: Blood   Result Value Ref Range    Hemoglobin A1C 5.8 (A) 4.5 - 5.7 %    Lot Number [id number]     Expiration Date 12/17/2025 1/17/24 A1C 5.4, lipids , TG 48, HDL 73, LDL 94, homocysteine, CRP < 3.0, stable CMP    Results for orders placed or performed in visit on 10/27/23   POC Glycosylated Hemoglobin (Hb A1C)    Specimen: Blood   Result Value Ref Range    Hemoglobin A1C 5.7 4.5 - 5.7 %    Lot Number [id number]     Expiration Date 06/20/25        ASSESSMENT/PLAN    Diagnoses and all orders for this visit:    1. Impaired fasting glucose (Primary)  Assessment & Plan:  BG control stable with A1C 5.8; encouraged reg phys activity to decr insulin resistance, moderation in unhealthy starches/sweets; f/u A1C in 6 mos      Orders:  -     POC Glycosylated Hemoglobin (Hb A1C)    2. Essential hypertension  Assessment & Plan:  BP mildly elevated, still abnl on repeat; cont home monitoring with goal < 130/80; no meds except acebutolol 200mg BID fo SVT; f/u in 6 mos at the latest, earlier if home readings are consistently > 140/90      3. Dyspepsia  Assessment & Plan:  Discussed minimizing habits and foods that could increase gas, incl using straws, chewing gum, and talking while eating; reviewed potential food triggers; then if no better, rec 30d trial of probiotic; encouraged reg phys activity to help with intestinal motility; note last colonosc 1/2023; f/u if no better      4. Temporal pain  Assessment & Plan:  LEFT temporal pain x 2 years, basically unchanged  in frequency or character and no assoc'd sxs, incl vision changes or eye pain; no palpable temporal artery; less likely temporal arteritis; f/u if sxs increase in frequency or duration or if eye changes develop; plan to check ESR and CRP with next labs      5. Menopause  -     DEXA Bone Density Axial; Future    6. Vaccine counseling  Comments:  rec 2nd dose COVID19 vacc - pt will get at pharmacy (none available in the office today)        FOLLOW-UP  Health maintenance - flu vacc, COVID19 vacc, and RSV vacc completed for this season; rec consideration for 2nd dose COVID19 vacc; due for DEXA - escribed  RTC for next wellness 10/29/24; fasting labs prior to appt (CBC, CMP, TSH, lipids, UA/micr, microalb, A1C) and f/u on left temporal pain and BP check    Electronically signed by:    Gabby Kebede MD, FACP  04/29/2024

## 2024-04-29 ENCOUNTER — OFFICE VISIT (OUTPATIENT)
Dept: INTERNAL MEDICINE | Facility: CLINIC | Age: 79
End: 2024-04-29
Payer: MEDICARE

## 2024-04-29 VITALS
TEMPERATURE: 96.6 F | HEIGHT: 61 IN | OXYGEN SATURATION: 97 % | HEART RATE: 62 BPM | DIASTOLIC BLOOD PRESSURE: 84 MMHG | SYSTOLIC BLOOD PRESSURE: 142 MMHG | WEIGHT: 137.8 LBS | BODY MASS INDEX: 26.01 KG/M2

## 2024-04-29 DIAGNOSIS — Z71.85 VACCINE COUNSELING: ICD-10-CM

## 2024-04-29 DIAGNOSIS — R10.13 DYSPEPSIA: ICD-10-CM

## 2024-04-29 DIAGNOSIS — I10 ESSENTIAL HYPERTENSION: Chronic | ICD-10-CM

## 2024-04-29 DIAGNOSIS — R51.9 TEMPORAL PAIN: ICD-10-CM

## 2024-04-29 DIAGNOSIS — Z78.0 MENOPAUSE: Chronic | ICD-10-CM

## 2024-04-29 DIAGNOSIS — R73.01 IMPAIRED FASTING GLUCOSE: Primary | Chronic | ICD-10-CM

## 2024-04-29 LAB
EXPIRATION DATE: ABNORMAL
HBA1C MFR BLD: 5.8 % (ref 4.5–5.7)
Lab: ABNORMAL

## 2024-04-29 PROCEDURE — 1160F RVW MEDS BY RX/DR IN RCRD: CPT | Performed by: INTERNAL MEDICINE

## 2024-04-29 PROCEDURE — 99214 OFFICE O/P EST MOD 30 MIN: CPT | Performed by: INTERNAL MEDICINE

## 2024-04-29 PROCEDURE — 1159F MED LIST DOCD IN RCRD: CPT | Performed by: INTERNAL MEDICINE

## 2024-04-29 PROCEDURE — 83036 HEMOGLOBIN GLYCOSYLATED A1C: CPT | Performed by: INTERNAL MEDICINE

## 2024-04-29 PROCEDURE — G2211 COMPLEX E/M VISIT ADD ON: HCPCS | Performed by: INTERNAL MEDICINE

## 2024-04-29 PROCEDURE — 3079F DIAST BP 80-89 MM HG: CPT | Performed by: INTERNAL MEDICINE

## 2024-04-29 PROCEDURE — 3077F SYST BP >= 140 MM HG: CPT | Performed by: INTERNAL MEDICINE

## 2024-04-29 PROCEDURE — 3044F HG A1C LEVEL LT 7.0%: CPT | Performed by: INTERNAL MEDICINE

## 2024-04-30 NOTE — ASSESSMENT & PLAN NOTE
Discussed minimizing habits and foods that could increase gas, incl using straws, chewing gum, and talking while eating; reviewed potential food triggers; then if no better, rec 30d trial of probiotic; encouraged reg phys activity to help with intestinal motility; note last colonosc 1/2023; f/u if no better

## 2024-04-30 NOTE — ASSESSMENT & PLAN NOTE
LEFT temporal pain x 2 years, basically unchanged in frequency or character and no assoc'd sxs, incl vision changes or eye pain; no palpable temporal artery; less likely temporal arteritis; f/u if sxs increase in frequency or duration or if eye changes develop; plan to check ESR and CRP with next labs

## 2024-05-23 ENCOUNTER — HOSPITAL ENCOUNTER (OUTPATIENT)
Dept: BONE DENSITY | Facility: HOSPITAL | Age: 79
Discharge: HOME | End: 2024-05-23
Admitting: INTERNAL MEDICINE
Payer: MEDICARE

## 2024-05-23 DIAGNOSIS — Z78.0 MENOPAUSE: Chronic | ICD-10-CM

## 2024-05-23 PROCEDURE — 77080 DXA BONE DENSITY AXIAL: CPT

## 2024-06-06 PROBLEM — H61.303 STENOSIS OF BOTH EXTERNAL AUDITORY CANALS: Status: ACTIVE | Noted: 2024-06-06

## 2024-10-03 DIAGNOSIS — Z00.00 MEDICARE ANNUAL WELLNESS VISIT, SUBSEQUENT: Primary | Chronic | ICD-10-CM

## 2024-10-03 DIAGNOSIS — R73.01 IMPAIRED FASTING GLUCOSE: Chronic | ICD-10-CM

## 2024-10-07 RX ORDER — ACEBUTOLOL HYDROCHLORIDE 200 MG/1
CAPSULE ORAL
Qty: 180 CAPSULE | Refills: 3 | Status: SHIPPED | OUTPATIENT
Start: 2024-10-07

## 2024-10-23 ENCOUNTER — LAB (OUTPATIENT)
Dept: LAB | Facility: HOSPITAL | Age: 79
End: 2024-10-23
Payer: MEDICARE

## 2024-10-23 DIAGNOSIS — Z00.00 MEDICARE ANNUAL WELLNESS VISIT, SUBSEQUENT: ICD-10-CM

## 2024-10-23 DIAGNOSIS — R73.01 IMPAIRED FASTING GLUCOSE: Chronic | ICD-10-CM

## 2024-10-23 LAB
ALBUMIN SERPL-MCNC: 4.1 G/DL (ref 3.5–5.2)
ALBUMIN UR-MCNC: <1.2 MG/DL
ALBUMIN/GLOB SERPL: 1.4 G/DL
ALP SERPL-CCNC: 56 U/L (ref 39–117)
ALT SERPL W P-5'-P-CCNC: 17 U/L (ref 1–33)
ANION GAP SERPL CALCULATED.3IONS-SCNC: 9 MMOL/L (ref 5–15)
AST SERPL-CCNC: 27 U/L (ref 1–32)
BACTERIA UR QL AUTO: NORMAL /HPF
BASOPHILS # BLD AUTO: 0.04 10*3/MM3 (ref 0–0.2)
BASOPHILS NFR BLD AUTO: 0.9 % (ref 0–1.5)
BILIRUB SERPL-MCNC: 0.9 MG/DL (ref 0–1.2)
BILIRUB UR QL STRIP: NEGATIVE
BUN SERPL-MCNC: 13 MG/DL (ref 8–23)
BUN/CREAT SERPL: 14.6 (ref 7–25)
CALCIUM SPEC-SCNC: 9.3 MG/DL (ref 8.6–10.5)
CHLORIDE SERPL-SCNC: 101 MMOL/L (ref 98–107)
CHOLEST SERPL-MCNC: 191 MG/DL (ref 0–200)
CLARITY UR: CLEAR
CO2 SERPL-SCNC: 26 MMOL/L (ref 22–29)
COLOR UR: YELLOW
CREAT SERPL-MCNC: 0.89 MG/DL (ref 0.57–1)
CREAT UR-MCNC: 38.1 MG/DL
DEPRECATED RDW RBC AUTO: 42.4 FL (ref 37–54)
EGFRCR SERPLBLD CKD-EPI 2021: 65.6 ML/MIN/1.73
EOSINOPHIL # BLD AUTO: 0.14 10*3/MM3 (ref 0–0.4)
EOSINOPHIL NFR BLD AUTO: 3.1 % (ref 0.3–6.2)
ERYTHROCYTE [DISTWIDTH] IN BLOOD BY AUTOMATED COUNT: 12.9 % (ref 12.3–15.4)
GLOBULIN UR ELPH-MCNC: 2.9 GM/DL
GLUCOSE SERPL-MCNC: 93 MG/DL (ref 65–99)
GLUCOSE UR STRIP-MCNC: NEGATIVE MG/DL
HBA1C MFR BLD: 5.5 % (ref 4.8–5.6)
HCT VFR BLD AUTO: 38.6 % (ref 34–46.6)
HDLC SERPL-MCNC: 69 MG/DL (ref 40–60)
HGB BLD-MCNC: 13.1 G/DL (ref 12–15.9)
HGB UR QL STRIP.AUTO: NEGATIVE
HYALINE CASTS UR QL AUTO: NORMAL /LPF
IMM GRANULOCYTES # BLD AUTO: 0.01 10*3/MM3 (ref 0–0.05)
IMM GRANULOCYTES NFR BLD AUTO: 0.2 % (ref 0–0.5)
KETONES UR QL STRIP: NEGATIVE
LDLC SERPL CALC-MCNC: 112 MG/DL (ref 0–100)
LDLC/HDLC SERPL: 1.62 {RATIO}
LEUKOCYTE ESTERASE UR QL STRIP.AUTO: NEGATIVE
LYMPHOCYTES # BLD AUTO: 1.55 10*3/MM3 (ref 0.7–3.1)
LYMPHOCYTES NFR BLD AUTO: 34.1 % (ref 19.6–45.3)
MCH RBC QN AUTO: 30.9 PG (ref 26.6–33)
MCHC RBC AUTO-ENTMCNC: 33.9 G/DL (ref 31.5–35.7)
MCV RBC AUTO: 91 FL (ref 79–97)
MICROALBUMIN/CREAT UR: NORMAL MG/G{CREAT}
MONOCYTES # BLD AUTO: 0.49 10*3/MM3 (ref 0.1–0.9)
MONOCYTES NFR BLD AUTO: 10.8 % (ref 5–12)
NEUTROPHILS NFR BLD AUTO: 2.32 10*3/MM3 (ref 1.7–7)
NEUTROPHILS NFR BLD AUTO: 50.9 % (ref 42.7–76)
NITRITE UR QL STRIP: NEGATIVE
NRBC BLD AUTO-RTO: 0 /100 WBC (ref 0–0.2)
PH UR STRIP.AUTO: 7.5 [PH] (ref 5–8)
PLATELET # BLD AUTO: 221 10*3/MM3 (ref 140–450)
PMV BLD AUTO: 9.3 FL (ref 6–12)
POTASSIUM SERPL-SCNC: 4 MMOL/L (ref 3.5–5.2)
PROT SERPL-MCNC: 7 G/DL (ref 6–8.5)
PROT UR QL STRIP: NEGATIVE
RBC # BLD AUTO: 4.24 10*6/MM3 (ref 3.77–5.28)
RBC # UR STRIP: NORMAL /HPF
REF LAB TEST METHOD: NORMAL
SODIUM SERPL-SCNC: 136 MMOL/L (ref 136–145)
SP GR UR STRIP: 1.01 (ref 1–1.03)
SQUAMOUS #/AREA URNS HPF: NORMAL /HPF
TRIGL SERPL-MCNC: 51 MG/DL (ref 0–150)
TSH SERPL DL<=0.05 MIU/L-ACNC: 1.52 UIU/ML (ref 0.27–4.2)
UROBILINOGEN UR QL STRIP: NORMAL
VLDLC SERPL-MCNC: 10 MG/DL (ref 5–40)
WBC # UR STRIP: NORMAL /HPF
WBC NRBC COR # BLD AUTO: 4.55 10*3/MM3 (ref 3.4–10.8)

## 2024-10-23 PROCEDURE — 82043 UR ALBUMIN QUANTITATIVE: CPT

## 2024-10-23 PROCEDURE — 84443 ASSAY THYROID STIM HORMONE: CPT

## 2024-10-23 PROCEDURE — 81001 URINALYSIS AUTO W/SCOPE: CPT

## 2024-10-23 PROCEDURE — 85025 COMPLETE CBC W/AUTO DIFF WBC: CPT

## 2024-10-23 PROCEDURE — 82570 ASSAY OF URINE CREATININE: CPT

## 2024-10-23 PROCEDURE — 80061 LIPID PANEL: CPT

## 2024-10-23 PROCEDURE — 80053 COMPREHEN METABOLIC PANEL: CPT

## 2024-10-23 PROCEDURE — 83036 HEMOGLOBIN GLYCOSYLATED A1C: CPT

## 2024-10-28 NOTE — ASSESSMENT & PLAN NOTE
BG control stable/improved with A1C 5.5; encouraged reg phys activity to decr insulin resistance, moderation in unhealthy starches/sweets; f/u A1C in 6 mos

## 2024-10-28 NOTE — PROGRESS NOTES
ANNUAL WELLNESS VISIT    DRUG AND ALCOHOL USE      no alcohol use, no tobacco use, and no caffeine use    DIET AND PHYSICAL ACTIVITY     Diet: general, low fat, low salt, limited junk food    Exercise: infrequently   Exercise Details: walking, aerobics, stretching/yoga, strength training    MOOD DISORDER AND COGNITIVE SCREENING     PHQ-2 Depression Screening - components reviewed with patient; screening is negative for active depression.    Little interest or pleasure in doing things? Not at all   Feeling down, depressed, or hopeless? Not at all   PHQ-2 Total Score 0       Anxiety Screening Tool Used YES     AUDIT screening  0     Mini-Cog Performed   Yes    1. Tell Patient 3 Words Apple table kelly    2. Administer Clock Test normal    3. Recall 3 words  Apple table kelly    4. Number Correct Items 3    FUNCTIONAL ABILITY AND LEVEL OF SAFETY   Hearing no hearing loss     Wears Hearing Aids No       Current Activities Independent      none  - see Funct/Cog Status Intake     Fall Risk Assessment       Has difficulty with walking or balance  No         Timed Up and Go (TUG) Test  10 sec.       If >12 sec, normal    ADVANCED DIRECTIVE  Advance Care Planning   ACP discussion was held with the patient during this visit. Patient has an advance directive in EMR which is still valid.   Advance Care Planning Discussion:  Patient has advanced directive and living will.  Reviewed desires for end of life care, which is to have comfort care.  Patient does not want extraordinary life-sustaining measures, including no prolonged artificial life support. Encouraged patient to ensure family is aware of desires/preferences. Confirmed son Walter is her healthcare surrogate.    PAIN SCREENING Do you have pain right now? no      If so, 1-10 scale: 0          Do you have pain every day? No      Probable chronic pain: No     Recent Hospitalizations:  No hospitalization(s) within the last year..     MEDICATION REVIEW   - updated and  reviewed (see Medication List).   - reviewed for potentially harmful drug-disease interactions in the elderly.   - reviewed for high risk medications in the elderly.   - aspirin use: No    BMI  Body mass index is 27.11 kg/m².  BMI is >= 25 and <30. (Overweight) The following options were offered after discussion;: exercise counseling/recommendations and nutrition counseling/recommendations       ______________________________________    Chief Complaint   Patient presents with    Medicare Wellness-subsequent    Hypertension    Hyperlipidemia    Impaired fasting glucose       History of Present Illness  79 y.o.  female presents for updated phys examination and wellness visit as well as f/u on sugars and BP. Reports home BPs have been 110-120s/60s.    Reports known extreme food sensitivity; takes xyzal 1/2 as needed, usually about once per week. Cannot take daily bc some mornings, it makes her waker up groggy and takes several hours to subside. No particular triggers or associations to figure out why sometimes it causes grogginess. Reports claritin and allegra do not help. Regarding food sensitivity, notes itching without rash. Reports allergy manifests as whole body itching and/or restless legs type of sxs. Not changing in frequency or severity. No assoc'd throat, tongue, or lip swelling.     Reports dx of L cataract, not significant on the R; does not have severe limitations due to cataract. Admits to more difficult night vision.     Reports head hair loss;  does not feel she is losing much hair. Patient reports no bald spots. No hair loss from other parts of the body.    Review of Systems  Denies headaches, visual changes, CP, palpitations, SOB, cough, abd pain, n/v/d, difficulty with urination, numbness/tingling, falls, mood changes, lightheadedness, hearing changes. ROS (+) for episodes of pruritus related to food sensitivity without assoc'd rash. ROS (+) for head hair loss.   Denies vaginal  "discharge or bleeding (no periods) or breast concerns.    All other ROS reviewed and negative.    Current Outpatient Medications:     acebutolol (SECTRAL) 200 MG BID    calcium carbonate-vitamin d 600-400 MG-UNIT QD    CRANBERRY SOFT PO, Take 4,200 mg QD    Estrogens Conjugated (Premarin) 0.625 MG/GM vaginal cream weekly    ipratropium (ATROVENT) 0.03 % nasal spray AD    Multiple Vitamins-Minerals QD    xyzal 5mg 1/2 QD prn (usually 1x/week)      OPIOID SCREENING:  The patient does not have opioid prescription on her medication list. Medication list has been reviewed with the patient regarding potentially high risk medications and harmful drug interactions in patients over age 65.    VITALS:  /78   Pulse 66   Ht 152.4 cm (60\")   Wt 63 kg (138 lb 12.8 oz)   SpO2 97%   BMI 27.11 kg/m²   Repeat /75 left arm    Physical Exam  Vitals and nursing note reviewed.   Constitutional:       General: She is not in acute distress.     Appearance: Normal appearance. She is well-developed.   HENT:      Head: Normocephalic.      Right Ear: Tympanic membrane, ear canal and external ear normal.      Left Ear: Tympanic membrane, ear canal and external ear normal.      Nose: Nose normal.   Eyes:      Extraocular Movements: Extraocular movements intact.      Conjunctiva/sclera: Conjunctivae normal.      Pupils: Pupils are equal, round, and reactive to light.      Comments: Wearing glasses   Neck:      Vascular: No carotid bruit (bilaterally).   Cardiovascular:      Rate and Rhythm: Normal rate and regular rhythm.      Heart sounds: Normal heart sounds.   Pulmonary:      Effort: Pulmonary effort is normal. No respiratory distress.      Breath sounds: Normal breath sounds. No wheezing, rhonchi or rales.   Abdominal:      General: Bowel sounds are normal. There is no distension.      Palpations: Abdomen is soft. There is no mass.      Tenderness: There is no abdominal tenderness.   Genitourinary:     Comments: Chaperone " declined by patient.    Breast exam unremarkable without masses, skin changes, nipple discharge, or axillary adenopathy.      Musculoskeletal:      Cervical back: Normal range of motion and neck supple.      Right lower leg: No edema.      Left lower leg: No edema.   Lymphadenopathy:      Cervical: No cervical adenopathy.   Skin:     General: Skin is warm and dry.      Findings: No rash.   Neurological:      Mental Status: She is alert and oriented to person, place, and time.      Gait: Gait normal.   Psychiatric:         Mood and Affect: Mood normal.         Behavior: Behavior normal.         LABS  Results for orders placed or performed in visit on 10/23/24   Comprehensive Metabolic Panel    Collection Time: 10/23/24  8:47 AM    Specimen: Blood   Result Value Ref Range    Glucose 93 65 - 99 mg/dL    BUN 13 8 - 23 mg/dL    Creatinine 0.89 0.57 - 1.00 mg/dL    Sodium 136 136 - 145 mmol/L    Potassium 4.0 3.5 - 5.2 mmol/L    Chloride 101 98 - 107 mmol/L    CO2 26.0 22.0 - 29.0 mmol/L    Calcium 9.3 8.6 - 10.5 mg/dL    Total Protein 7.0 6.0 - 8.5 g/dL    Albumin 4.1 3.5 - 5.2 g/dL    ALT (SGPT) 17 1 - 33 U/L    AST (SGOT) 27 1 - 32 U/L    Alkaline Phosphatase 56 39 - 117 U/L    Total Bilirubin 0.9 0.0 - 1.2 mg/dL    Globulin 2.9 gm/dL    A/G Ratio 1.4 g/dL    BUN/Creatinine Ratio 14.6 7.0 - 25.0    Anion Gap 9.0 5.0 - 15.0 mmol/L    eGFR 65.6 >60.0 mL/min/1.73   Lipid Panel    Collection Time: 10/23/24  8:47 AM    Specimen: Blood   Result Value Ref Range    Total Cholesterol 191 0 - 200 mg/dL    Triglycerides 51 0 - 150 mg/dL    HDL Cholesterol 69 (H) 40 - 60 mg/dL    LDL Cholesterol  112 (H) 0 - 100 mg/dL    VLDL Cholesterol 10 5 - 40 mg/dL    LDL/HDL Ratio 1.62    TSH    Collection Time: 10/23/24  8:47 AM    Specimen: Blood   Result Value Ref Range    TSH 1.520 0.270 - 4.200 uIU/mL   Microalbumin / Creatinine Urine Ratio - Urine, Clean Catch    Collection Time: 10/23/24  8:47 AM    Specimen: Urine, Clean Catch    Result Value Ref Range    Microalbumin/Creatinine Ratio      Creatinine, Urine 38.1 mg/dL    Microalbumin, Urine <1.2 mg/dL   Hemoglobin A1c    Collection Time: 10/23/24  8:47 AM    Specimen: Blood   Result Value Ref Range    Hemoglobin A1C 5.50 4.80 - 5.60 %   CBC Auto Differential    Collection Time: 10/23/24  8:47 AM    Specimen: Blood   Result Value Ref Range    WBC 4.55 3.40 - 10.80 10*3/mm3    RBC 4.24 3.77 - 5.28 10*6/mm3    Hemoglobin 13.1 12.0 - 15.9 g/dL    Hematocrit 38.6 34.0 - 46.6 %    MCV 91.0 79.0 - 97.0 fL    MCH 30.9 26.6 - 33.0 pg    MCHC 33.9 31.5 - 35.7 g/dL    RDW 12.9 12.3 - 15.4 %    RDW-SD 42.4 37.0 - 54.0 fl    MPV 9.3 6.0 - 12.0 fL    Platelets 221 140 - 450 10*3/mm3    Neutrophil % 50.9 42.7 - 76.0 %    Lymphocyte % 34.1 19.6 - 45.3 %    Monocyte % 10.8 5.0 - 12.0 %    Eosinophil % 3.1 0.3 - 6.2 %    Basophil % 0.9 0.0 - 1.5 %    Immature Grans % 0.2 0.0 - 0.5 %    Neutrophils, Absolute 2.32 1.70 - 7.00 10*3/mm3    Lymphocytes, Absolute 1.55 0.70 - 3.10 10*3/mm3    Monocytes, Absolute 0.49 0.10 - 0.90 10*3/mm3    Eosinophils, Absolute 0.14 0.00 - 0.40 10*3/mm3    Basophils, Absolute 0.04 0.00 - 0.20 10*3/mm3    Immature Grans, Absolute 0.01 0.00 - 0.05 10*3/mm3    nRBC 0.0 0.0 - 0.2 /100 WBC   Urinalysis without microscopic (no culture) - Urine, Clean Catch    Collection Time: 10/23/24  8:47 AM    Specimen: Urine, Clean Catch   Result Value Ref Range    Color, UA Yellow Yellow, Straw    Appearance, UA Clear Clear    pH, UA 7.5 5.0 - 8.0    Specific Gravity, UA 1.010 1.005 - 1.030    Glucose, UA Negative Negative    Ketones, UA Negative Negative    Bilirubin, UA Negative Negative    Blood, UA Negative Negative    Protein, UA Negative Negative    Leuk Esterase, UA Negative Negative    Nitrite, UA Negative Negative    Urobilinogen, UA 0.2 E.U./dL 0.2 - 1.0 E.U./dL   Urinalysis, Microscopic Only - Urine, Clean Catch    Collection Time: 10/23/24  8:47 AM    Specimen: Urine, Clean Catch    Result Value Ref Range    RBC, UA 0-2 None Seen, 0-2 /HPF    WBC, UA None Seen None Seen, 0-2 /HPF    Bacteria, UA None Seen None Seen /HPF    Squamous Epithelial Cells, UA 0-2 None Seen, 0-2 /HPF    Hyaline Casts, UA None Seen None Seen /LPF    Methodology Automated Microscopy      4/29/24 A1C 5.8      ECG 12 Lead    Date/Time: 10/29/2024 11:48 AM  Performed by: Gabby Kebede MD    Authorized by: Gabby Kebede MD  Comparison: compared with previous ECG from 10/27/2023  Similar to previous ECG  Rhythm: sinus rhythm and sinus arrhythmia  Rate: normal  BPM: 62  Conduction: conduction normal  ST Segments: ST segments normal  T Waves: T waves normal  QRS axis: left  Clinical impression comment: stable EKG        ASSESSMENT/PLAN    Diagnoses and all orders for this visit:    1. Medicare annual wellness visit, subsequent (Primary)  Assessment & Plan:  Health maintenance - COVID19 vacc 9/24; flu vacc 9/24; RSV 11/23; Prevnar 7/15, PVX 5/11, Tdap 9/19 (next Tdap due 2029); HAV and Shingrix done; mammo 12/11/23, pending 12/12/24; no further Paps unless abnlities; DEXA 5/24, repeat 2026, colonosc 1/23, no further per Dr. Link; eye exam w/ Dr. Keenan 2/42 - noted to have cataracts L>R; dental exam q6 mos; (+) seat belt use    Consultants:  Patient Care Team:  Gabby Kebede MD as PCP - General  Gabby Kebede MD as PCP - Internal Medicine  Casey Link MD as Consulting Physician (Gastroenterology)  Anuel Diaz MD as Consulting Physician (Orthopedic Surgery)  Matthew Vanegas MD as Consulting Physician (Orthopedic Surgery)  FLOR Ying Jr., MD as Consulting Physician (Ophthalmology)  Melia Keenan MD as Consulting Physician (Ophthalmology)  Raghavendra Coburn MD as Consulting Physician (Otolaryngology)  Nestor Mcdaniels MD as Consulting Physician (Pediatric Allergy and Immunology)  Delfina Gill APRN as Nurse Practitioner (Dermatology)  Reyes Farrar MD as Consulting  Physician (Dermatology)  Norah Gallagher PA as Physician Assistant (Dermatology)  Yara Swann MD as Consulting Physician (Cardiology)        2. Essential hypertension  Assessment & Plan:  BP mildly elevated, no change in repeat; note patient reports normotensive readings at home; currently no meds except acebutolol 200mg BID for SVT; rec home BP monitoring with goal < 130/80 (reviewed how to correctly check BPs); f/u in     Orders:  -     ECG 12 Lead    3. Impaired fasting glucose  Assessment & Plan:  BG control stable/improved with A1C 5.5; encouraged reg phys activity to decr insulin resistance, moderation in unhealthy starches/sweets; f/u A1C in 6 mos        4. Hyperlipidemia  Assessment & Plan:  Lipids stable with ; no meds; goal LDL < 130, ideally < 100      5. Urticaria  Assessment & Plan:  Wholed body itching attributed to food sensitivity; patient reports avoidance of foods high in histamine as well as too many preservatives; uses xyzal 5mg 1/2 prn flare-ups, which are occurring about weekly; unable to take xyzal daily due to occ s/e of grogginess in the AM      6. Hair loss  Comments:  only head hair loss; no bald spots; discussed role of stress; follow clinically    7. Age-related cataract of both eyes, unspecified age-related cataract type  Assessment & Plan:  L>R, not very symptomatic; will put off surgery for now          FOLLOW-UP  RTC 6 mos with A1C and BP check (rec bringing BP cuff to verify accuracy)    Electronically signed by:    Gabby Kebede MD, FACP  10/29/2024

## 2024-10-28 NOTE — ASSESSMENT & PLAN NOTE
Health maintenance - COVID19 vacc 9/24; flu vacc 9/24; RSV 11/23; Prevnar 7/15, PVX 5/11, Tdap 9/19 (next Tdap due 2029); HAV and Shingrix done; mammo 12/11/23, pending 12/12/24; no further Paps unless abnlities; DEXA 5/24, repeat 2026, colonosc 1/23, no further per Dr. Link; eye exam w/ Dr. Keenan 2/42 - noted to have cataracts L>R; dental exam q6 mos; (+) seat belt use    Consultants:  Patient Care Team:  Gabby Kebede MD as PCP - General  Gabby Kebede MD as PCP - Internal Medicine  Casey Link MD as Consulting Physician (Gastroenterology)  Anuel Diaz MD as Consulting Physician (Orthopedic Surgery)  Matthew Vanegas MD as Consulting Physician (Orthopedic Surgery)  FLOR Ying Jr., MD as Consulting Physician (Ophthalmology)  Melia Keenan MD as Consulting Physician (Ophthalmology)  Raghavendra Coburn MD as Consulting Physician (Otolaryngology)  Nestor Mcdaniels MD as Consulting Physician (Pediatric Allergy and Immunology)  Delfina Gill APRN as Nurse Practitioner (Dermatology)  Reyes Farrar MD as Consulting Physician (Dermatology)  Norah Gallagher PA as Physician Assistant (Dermatology)  Yara Swann MD as Consulting Physician (Cardiology)

## 2024-10-29 ENCOUNTER — OFFICE VISIT (OUTPATIENT)
Dept: INTERNAL MEDICINE | Facility: CLINIC | Age: 79
End: 2024-10-29
Payer: MEDICARE

## 2024-10-29 VITALS
SYSTOLIC BLOOD PRESSURE: 140 MMHG | HEIGHT: 60 IN | WEIGHT: 138.8 LBS | HEART RATE: 66 BPM | OXYGEN SATURATION: 97 % | DIASTOLIC BLOOD PRESSURE: 78 MMHG | BODY MASS INDEX: 27.25 KG/M2

## 2024-10-29 DIAGNOSIS — I10 ESSENTIAL HYPERTENSION: Chronic | ICD-10-CM

## 2024-10-29 DIAGNOSIS — L50.9 URTICARIA: ICD-10-CM

## 2024-10-29 DIAGNOSIS — Z00.00 MEDICARE ANNUAL WELLNESS VISIT, SUBSEQUENT: Primary | ICD-10-CM

## 2024-10-29 DIAGNOSIS — E78.00 PURE HYPERCHOLESTEROLEMIA: Chronic | ICD-10-CM

## 2024-10-29 DIAGNOSIS — L65.9 HAIR LOSS: ICD-10-CM

## 2024-10-29 DIAGNOSIS — R73.01 IMPAIRED FASTING GLUCOSE: Chronic | ICD-10-CM

## 2024-10-29 DIAGNOSIS — H25.9 AGE-RELATED CATARACT OF BOTH EYES, UNSPECIFIED AGE-RELATED CATARACT TYPE: ICD-10-CM

## 2024-10-29 PROCEDURE — 3077F SYST BP >= 140 MM HG: CPT | Performed by: INTERNAL MEDICINE

## 2024-10-29 PROCEDURE — 1160F RVW MEDS BY RX/DR IN RCRD: CPT | Performed by: INTERNAL MEDICINE

## 2024-10-29 PROCEDURE — 93000 ELECTROCARDIOGRAM COMPLETE: CPT | Performed by: INTERNAL MEDICINE

## 2024-10-29 PROCEDURE — 1159F MED LIST DOCD IN RCRD: CPT | Performed by: INTERNAL MEDICINE

## 2024-10-29 PROCEDURE — 99397 PER PM REEVAL EST PAT 65+ YR: CPT | Performed by: INTERNAL MEDICINE

## 2024-10-29 PROCEDURE — 1126F AMNT PAIN NOTED NONE PRSNT: CPT | Performed by: INTERNAL MEDICINE

## 2024-10-29 PROCEDURE — 3078F DIAST BP <80 MM HG: CPT | Performed by: INTERNAL MEDICINE

## 2024-10-29 PROCEDURE — 1170F FXNL STATUS ASSESSED: CPT | Performed by: INTERNAL MEDICINE

## 2024-10-29 PROCEDURE — G0439 PPPS, SUBSEQ VISIT: HCPCS | Performed by: INTERNAL MEDICINE

## 2024-10-30 RX ORDER — LEVOCETIRIZINE DIHYDROCHLORIDE 5 MG/1
2.5 TABLET, FILM COATED ORAL DAILY PRN
COMMUNITY
End: 2024-10-31 | Stop reason: SDUPTHER

## 2024-10-30 NOTE — ASSESSMENT & PLAN NOTE
Wholed body itching attributed to food sensitivity; patient reports avoidance of foods high in histamine as well as too many preservatives; uses xyzal 5mg 1/2 prn flare-ups, which are occurring about weekly; unable to take xyzal daily due to occ s/e of grogginess in the AM

## 2024-10-31 DIAGNOSIS — Z78.0 MENOPAUSE: ICD-10-CM

## 2024-10-31 RX ORDER — LEVOCETIRIZINE DIHYDROCHLORIDE 5 MG/1
2.5 TABLET, FILM COATED ORAL DAILY PRN
Qty: 90 TABLET | Refills: 0 | Status: SHIPPED | OUTPATIENT
Start: 2024-10-31

## 2024-10-31 RX ORDER — CONJUGATED ESTROGENS 0.62 MG/G
CREAM VAGINAL WEEKLY
Qty: 30 G | Refills: 0 | Status: SHIPPED | OUTPATIENT
Start: 2024-10-31

## 2024-11-01 PROBLEM — H61.303 STENOSIS OF BOTH EXTERNAL AUDITORY CANALS: Chronic | Status: ACTIVE | Noted: 2024-06-06

## 2024-11-01 PROBLEM — C44.41 BASAL CELL CARCINOMA (BCC) OF SCALP: Status: ACTIVE | Noted: 2024-11-01

## 2024-11-27 ENCOUNTER — DOCUMENTATION (OUTPATIENT)
Dept: GENETICS | Facility: HOSPITAL | Age: 79
End: 2024-11-27
Payer: MEDICARE

## 2024-11-27 LAB
NCCN CRITERIA FLAG: ABNORMAL
TYRER CUZICK SCORE: 4.2

## 2024-11-27 NOTE — PROGRESS NOTES
This patient recently took the CARE risk assessment as part of their mammogram appointment. Based on the patient's responses, NCCN criteria for genetic testing was met.     Navigator follow-up:   The patient declined genetic testing at the time of assessment.

## 2024-11-27 NOTE — PROGRESS NOTES
Meets NCCN Criteria for Genetic Testing  Declines genetic testing? Y   Reason for decline? Personal Choice   If Reason for Decline is Previous Testing    [facility] CARE     Non-CARE     Non-CARE Confirmation    Navigator Confirmed?     Patient Reported?     Elevated Tyrer-Cuzick Score ? Or Equal to 20%    Declines referral?     Reason for decline?

## 2024-12-12 ENCOUNTER — HOSPITAL ENCOUNTER (OUTPATIENT)
Dept: MAMMOGRAPHY | Facility: HOSPITAL | Age: 79
Discharge: HOME | End: 2024-12-12
Admitting: INTERNAL MEDICINE
Payer: MEDICARE

## 2024-12-12 DIAGNOSIS — Z12.31 VISIT FOR SCREENING MAMMOGRAM: ICD-10-CM

## 2024-12-12 PROCEDURE — 77063 BREAST TOMOSYNTHESIS BI: CPT

## 2024-12-12 PROCEDURE — 77067 SCR MAMMO BI INCL CAD: CPT

## 2025-05-04 NOTE — ASSESSMENT & PLAN NOTE
BG control stable with A1C 5.5; encouraged reg phys activity to decr insulin resistance, moderation in unhealthy starches/sweets; f/u A1C in 6 mos with next wellness

## 2025-05-04 NOTE — PROGRESS NOTES
"Chief Complaint   Patient presents with    Hypertension    Impaired fasting glucose       History of Present Illness  79 y.o.  female presents for f/u on BP and sugars. She brings her BP cuff as well as home readings showing BPs 110-120s/60-70s with pulse 50-60s.     Reports evaluation per Dr. Keenan and recommended to have cataract surgery. Will be seeing Dr. Muse.    Review of Systems  Denies CP, SOB, palpitations. All other ROS reviewed and negative.    Current Outpatient Medications:     acebutolol (SECTRAL) 200 MG BID    calcium carbonate-vitamin d 600-400 MG-UNIT QD    CRANBERRY SOFT 4,200 mg D    Estrogens Conjugated (Premarin) 0.625 MG/GM vaginal cream weekly    ipratropium (ATROVENT) 0.03 % nasal spray AD    levocetirizine (XYZAL) 5 MG 1/2 QD    Multiple Vitamins-Minerals QD    VITALS:  /78   Pulse 60   Ht 152.4 cm (60\")   Wt 63.8 kg (140 lb 9.6 oz)   SpO2 100%   BMI 27.46 kg/m²   /80, her BP cuff 151/88    Repeat BP on her  machine left arm 137/85, pulse 57, manual left arm 142/78    Physical Exam  Vitals and nursing note reviewed.   Constitutional:       General: She is not in acute distress.     Appearance: Normal appearance. She is not ill-appearing.   Eyes:      Extraocular Movements: Extraocular movements intact.      Conjunctiva/sclera: Conjunctivae normal.      Comments: Wearing glasses   Pulmonary:      Effort: Pulmonary effort is normal. No respiratory distress.   Neurological:      Mental Status: She is alert and oriented to person, place, and time. Mental status is at baseline.      Gait: Gait normal.   Psychiatric:         Mood and Affect: Mood normal.         Behavior: Behavior normal.       LABS  Results for orders placed or performed in visit on 05/06/25   POC Glycosylated Hemoglobin (Hb A1C)    Collection Time: 05/06/25  8:33 AM    Specimen: Blood   Result Value Ref Range    Hemoglobin A1C 5.5 4.5 - 5.7 %    Lot Number [id number]     Expiration Date 01/17/2027  "     10/23/24 A1C 5.5    ASSESSMENT/PLAN    Diagnoses and all orders for this visit:    1. Impaired fasting glucose (Primary)  Assessment & Plan:  BG control stable with A1C 5.5; encouraged reg phys activity to decr insulin resistance, moderation in unhealthy starches/sweets; f/u A1C in 6 mos with next wellness      Orders:  -     POC Glycosylated Hemoglobin (Hb A1C)    2. Essential hypertension  Assessment & Plan:  BP borderline elevated but normotensive readings at home; her BP machine is almost accurate, maybe reads about 5 points systolic too low; currently no meds except cont acebutolol 200mg BID for SVT; cont home BP monitoring with goal < 130/80; f/u in 6 mos      3. Age-related cataract of both eyes, unspecified age-related cataract type  Assessment & Plan:  Surgery recommended by Dr. Keenan; will be seeing Dr. Muse or Dr. Frost per pt          FOLLOW-UP  Health maintenance - flu vacc and COVID19 vacc done for this season; RSV vacc prev completed  RTC for next wellness  11/10/25; fasting labs prior to appt (CBC, CMP, TSH, lipids, UA/micr, A1C, microalb/Cr)    Electronically signed by:    Gabby Kebede MD, FACP  05/06/2025

## 2025-05-04 NOTE — ASSESSMENT & PLAN NOTE
BP borderline elevated but normotensive readings at home; her BP machine is almost accurate, maybe reads about 5 points systolic too low; currently no meds except cont acebutolol 200mg BID for SVT; cont home BP monitoring with goal < 130/80; f/u in 6 mos

## 2025-05-06 ENCOUNTER — OFFICE VISIT (OUTPATIENT)
Dept: INTERNAL MEDICINE | Facility: CLINIC | Age: 80
End: 2025-05-06
Payer: MEDICARE

## 2025-05-06 VITALS
BODY MASS INDEX: 27.61 KG/M2 | DIASTOLIC BLOOD PRESSURE: 78 MMHG | SYSTOLIC BLOOD PRESSURE: 142 MMHG | OXYGEN SATURATION: 100 % | HEIGHT: 60 IN | HEART RATE: 60 BPM | WEIGHT: 140.6 LBS

## 2025-05-06 DIAGNOSIS — R73.01 IMPAIRED FASTING GLUCOSE: Primary | Chronic | ICD-10-CM

## 2025-05-06 DIAGNOSIS — H25.9 AGE-RELATED CATARACT OF BOTH EYES, UNSPECIFIED AGE-RELATED CATARACT TYPE: ICD-10-CM

## 2025-05-06 DIAGNOSIS — I10 ESSENTIAL HYPERTENSION: Chronic | ICD-10-CM

## 2025-05-06 LAB
EXPIRATION DATE: NORMAL
HBA1C MFR BLD: 5.5 % (ref 4.5–5.7)
Lab: NORMAL

## 2025-05-07 ENCOUNTER — OFFICE VISIT (OUTPATIENT)
Dept: CARDIOLOGY | Facility: CLINIC | Age: 80
End: 2025-05-07
Payer: MEDICARE

## 2025-05-07 VITALS
DIASTOLIC BLOOD PRESSURE: 68 MMHG | WEIGHT: 140 LBS | SYSTOLIC BLOOD PRESSURE: 116 MMHG | HEIGHT: 60 IN | BODY MASS INDEX: 27.48 KG/M2 | OXYGEN SATURATION: 98 % | HEART RATE: 65 BPM

## 2025-05-07 DIAGNOSIS — E78.5 DYSLIPIDEMIA: ICD-10-CM

## 2025-05-07 DIAGNOSIS — R00.2 PALPITATIONS: Primary | ICD-10-CM

## 2025-05-07 DIAGNOSIS — I10 ESSENTIAL HYPERTENSION: ICD-10-CM

## 2025-05-07 NOTE — PROGRESS NOTES
[facility] Cardiology    Patient ID: Brian aPblo is a 79 y.o. female.  : 1945   Contact: [phone]    Encounter date: 2025    PCP: Gabby Kebede MD      Chief complaint:   Chief Complaint   Patient presents with    Palpitations       Problem List:  Palpitations:  Holter monitor, 2018: Demonstrated sinus rhythm, 8 single VEs, 135 single SVEs, 10 paired.  Symptoms consistent with PACs and PVCs which are less than 1%  Residual class I symptoms with acceptable EKG  Echocardiogram, 2018: EF 0.68. Mild-to-moderate TR. Left ventricular diastolic dysfunction is abnormal consistent with: age. No evidence of pericardial effusion. Mild pulmonary hypertension.   Residual class I symptoms, 2019, 2019  Acceptable negative quantitative SPECT gated Cardiolite exercise stress test without anginal type chest discomfort, ischemic electrocardiographic changes, or myocardial perfusion abnormalities suggestive of low probability for significant focal obstructive coronary artery disease with preserved systolic left ventricular function (LVEF 0.74) following exercise to 91% predicted maximum heart rate and 158% predicted exercise capacity, May 2021  Event monitor in April/May 2021: Occasional PACs, 18 episodes of SVT, occasional PVCs, no ventricular tachycardia, no AV block  Residual class I symptoms 2021, 2022  Hyperlipidemia; ASCVD 10-year risk 14.7%, 10.8% if treated  Positional vertigo  Osteoarthritis  Surgical history:   Colonoscopy with polyp removal  C5-C6 fusion  Basal cell removal on head, 2022       Allergies   Allergen Reactions    Sulfamethoxazole GI Intolerance       Current Medications:    Current Outpatient Medications:     acebutolol (SECTRAL) 200 MG capsule, TAKE 1 CAPSULE BY MOUTH TWICE A DAY, Disp: 180 capsule, Rfl: 3    calcium carbonate-vitamin d 600-400 MG-UNIT per tablet, Take  by mouth Daily. Take as Directed, Disp: , Rfl:     " CRANBERRY SOFT PO, Take 4,200 mg by mouth Daily., Disp: , Rfl:     Estrogens Conjugated (Premarin) 0.625 MG/GM vaginal cream, Insert  into the vagina 1 (One) Time Per Week. As Directed once a week, Disp: 30 g, Rfl: 0    ipratropium (ATROVENT) 0.03 % nasal spray, SPRAY 2 SPRAYS BY INTRANASAL ROUTE TWICE A DAY AS NEEDED, Disp: , Rfl:     levocetirizine (XYZAL) 5 MG tablet, Take 0.5 tablets by mouth Daily As Needed for Allergies (itching)., Disp: 90 tablet, Rfl: 0    Multiple Vitamins-Minerals (MULTIVITAMIN ADULT PO), Take 1 tablet by mouth Daily., Disp: , Rfl:     HPI    Brian Pablo is a 79 y.o. female who presents today for a follow up of palpitations, and cardiac risk factors. Since last visit, she has done well without any hospitalizations or new medical diagnoses.  She has been monitoring her blood pressures at home and they have been controlled.  She has had a few episodes of feeling her heart \"skip a beat that will occur on awakening in the morning.  She does not feel symptomatic with this and it does not occur at any other time.  She wanted to mention it to make sure it was nothing to be concerned about.  She denies any exertional chest pain or shortness of breath.  She had recent blood work and her cholesterol is mildly elevated but HDL is elevated as well.  She and her primary have elected to defer statin therapy at present time.      The following portions of the patient's history were reviewed and updated as appropriate: allergies, current medications and problem list.    Pertinent positives as listed in the HPI.  All other systems reviewed are negative.         Vitals:    05/07/25 1002   BP: 116/68   BP Location: Left arm   Patient Position: Sitting   Pulse: 65   SpO2: 98%   Weight: 63.5 kg (140 lb)   Height: 152.4 cm (60\")       Physical Exam:  General: Alert and oriented.  Neck: Jugular venous pressure is within normal limits. Carotids have normal upstrokes without bruits.   Cardiovascular: Heart has " a nondisplaced focal PMI. Regular rate and rhythm. No murmur, gallop or rub.  Lungs: Clear, no rales or wheezes. Equal expansion is noted.   Extremities: Show no edema.  Skin: Warm and dry.  Neurologic: Nonfocal.     Diagnostic Data (reviewed with patient):  Lab Results   Component Value Date    GLUCOSE 93 10/23/2024    BUN 13 10/23/2024    CREATININE 0.89 10/23/2024    EGFRIFNONA 63 09/07/2021    BCR 14.6 10/23/2024     10/23/2024    K 4.0 10/23/2024     10/23/2024    CO2 26.0 10/23/2024    CALCIUM 9.3 10/23/2024    ALBUMIN 4.1 10/23/2024    ALKPHOS 56 10/23/2024    AST 27 10/23/2024    ALT 17 10/23/2024     Lab Results   Component Value Date    CHOL 191 10/23/2024    TRIG 51 10/23/2024    HDL 69 (H) 10/23/2024     (H) 10/23/2024      Lab Results   Component Value Date    WBC 4.55 10/23/2024    RBC 4.24 10/23/2024    HGB 13.1 10/23/2024    HCT 38.6 10/23/2024    MCV 91.0 10/23/2024     10/23/2024      Lab Results   Component Value Date    TSH 1.520 10/23/2024      Lab date: 01/16/2025  FLP: , TG 60, HDL 77,   CMP: Glu 89, BUN 10, Creat 0.76, eGFR 79.3, Na 138, K 3.8, Cl 102, CO2 24, Ca 9.3, Alk Phos 51, AST 29, ALT 24  HbA1c: 5.5      Procedures    Advance Care Planning   ACP discussion was held with the patient during this visit. Patient has an advance directive in EMR which is still valid.            Assessment:    ICD-10-CM ICD-9-CM   1. Palpitations  R00.2 785.1   2. Essential hypertension  I10 401.9   3. Dyslipidemia  E78.5 272.4         Plan:  Palpitations controlled.  If palpitations progress, worsen or she becomes symptomatic she will contact us and we will order monitor  Continue all other current medications.  F/up in 12 months, sooner if needed.      Caroline Coronel, APRN

## 2025-06-16 DIAGNOSIS — Z78.0 MENOPAUSE: ICD-10-CM

## 2025-06-16 RX ORDER — CONJUGATED ESTROGENS 0.62 MG/G
CREAM VAGINAL WEEKLY
Qty: 30 G | Refills: 0 | Status: SHIPPED | OUTPATIENT
Start: 2025-06-16

## 2025-06-16 NOTE — TELEPHONE ENCOUNTER
Last appointment: 5/6/2025  Next appointment: 11/10/2025     Last Refill: 10/31/2024 quantity of 30 g with 0 refills

## 2025-07-31 RX ORDER — LEVOCETIRIZINE DIHYDROCHLORIDE 5 MG/1
TABLET, FILM COATED ORAL
Qty: 45 TABLET | Refills: 0 | Status: SHIPPED | OUTPATIENT
Start: 2025-07-31